# Patient Record
Sex: FEMALE | Race: WHITE | NOT HISPANIC OR LATINO | Employment: FULL TIME | URBAN - METROPOLITAN AREA
[De-identification: names, ages, dates, MRNs, and addresses within clinical notes are randomized per-mention and may not be internally consistent; named-entity substitution may affect disease eponyms.]

---

## 2017-03-08 ENCOUNTER — LAB REQUISITION (OUTPATIENT)
Dept: LAB | Facility: HOSPITAL | Age: 37
End: 2017-03-08
Payer: COMMERCIAL

## 2017-03-08 DIAGNOSIS — Z11.51 ENCOUNTER FOR SCREENING FOR HUMAN PAPILLOMAVIRUS (HPV): ICD-10-CM

## 2017-03-08 DIAGNOSIS — Z01.419 ENCOUNTER FOR GYNECOLOGICAL EXAMINATION WITHOUT ABNORMAL FINDING: ICD-10-CM

## 2017-03-08 PROCEDURE — 87624 HPV HI-RISK TYP POOLED RSLT: CPT | Performed by: OBSTETRICS & GYNECOLOGY

## 2017-03-08 PROCEDURE — G0145 SCR C/V CYTO,THINLAYER,RESCR: HCPCS | Performed by: OBSTETRICS & GYNECOLOGY

## 2017-03-13 LAB — HPV RRNA GENITAL QL NAA+PROBE: NORMAL

## 2017-03-16 LAB
LAB AP GYN PRIMARY INTERPRETATION: NORMAL
LAB AP LMP: NORMAL
Lab: NORMAL
PATH INTERP SPEC-IMP: NORMAL

## 2017-04-10 ENCOUNTER — ALLSCRIPTS OFFICE VISIT (OUTPATIENT)
Dept: OTHER | Facility: OTHER | Age: 37
End: 2017-04-10

## 2017-06-06 ENCOUNTER — TRANSCRIBE ORDERS (OUTPATIENT)
Dept: LAB | Facility: HOSPITAL | Age: 37
End: 2017-06-06

## 2017-06-06 DIAGNOSIS — Z00.8 ENCOUNTER FOR OTHER GENERAL EXAMINATION: Primary | ICD-10-CM

## 2017-06-07 ENCOUNTER — APPOINTMENT (OUTPATIENT)
Dept: LAB | Facility: HOSPITAL | Age: 37
End: 2017-06-07
Payer: COMMERCIAL

## 2017-06-07 DIAGNOSIS — Z00.8 ENCOUNTER FOR OTHER GENERAL EXAMINATION: ICD-10-CM

## 2017-06-07 LAB
CHOLEST SERPL-MCNC: 154 MG/DL (ref 50–200)
EST. AVERAGE GLUCOSE BLD GHB EST-MCNC: 111 MG/DL
HBA1C MFR BLD: 5.5 % (ref 4.2–6.3)
HDLC SERPL-MCNC: 54 MG/DL (ref 40–60)
LDLC SERPL CALC-MCNC: 79 MG/DL (ref 0–100)
TRIGL SERPL-MCNC: 105 MG/DL

## 2017-06-07 PROCEDURE — 83036 HEMOGLOBIN GLYCOSYLATED A1C: CPT

## 2017-06-07 PROCEDURE — 36415 COLL VENOUS BLD VENIPUNCTURE: CPT

## 2017-06-07 PROCEDURE — 80061 LIPID PANEL: CPT

## 2017-09-22 PROCEDURE — G0145 SCR C/V CYTO,THINLAYER,RESCR: HCPCS | Performed by: OBSTETRICS & GYNECOLOGY

## 2017-09-22 PROCEDURE — 87624 HPV HI-RISK TYP POOLED RSLT: CPT | Performed by: OBSTETRICS & GYNECOLOGY

## 2017-09-30 ENCOUNTER — LAB REQUISITION (OUTPATIENT)
Dept: LAB | Facility: HOSPITAL | Age: 37
End: 2017-09-30
Payer: COMMERCIAL

## 2017-09-30 DIAGNOSIS — R87.610 ATYPICAL SQUAMOUS CELLS OF UNDETERMINED SIGNIFICANCE ON CYTOLOGIC SMEAR OF CERVIX (ASC-US): ICD-10-CM

## 2017-10-19 LAB
HPV RRNA GENITAL QL NAA+PROBE: NORMAL
LAB AP GYN PRIMARY INTERPRETATION: NORMAL
Lab: NORMAL
PATH INTERP SPEC-IMP: NORMAL

## 2018-01-13 VITALS
HEIGHT: 59 IN | HEART RATE: 96 BPM | RESPIRATION RATE: 16 BRPM | BODY MASS INDEX: 35.08 KG/M2 | TEMPERATURE: 98.2 F | DIASTOLIC BLOOD PRESSURE: 68 MMHG | WEIGHT: 174 LBS | SYSTOLIC BLOOD PRESSURE: 112 MMHG | OXYGEN SATURATION: 98 %

## 2018-03-09 ENCOUNTER — LAB REQUISITION (OUTPATIENT)
Dept: LAB | Facility: HOSPITAL | Age: 38
End: 2018-03-09
Payer: COMMERCIAL

## 2018-03-09 DIAGNOSIS — R87.610 ATYPICAL SQUAMOUS CELLS OF UNDETERMINED SIGNIFICANCE ON CYTOLOGIC SMEAR OF CERVIX (ASC-US): ICD-10-CM

## 2018-03-09 DIAGNOSIS — R87.810 CERVICAL HIGH RISK HUMAN PAPILLOMAVIRUS (HPV) DNA TEST POSITIVE: ICD-10-CM

## 2018-03-09 PROCEDURE — G0145 SCR C/V CYTO,THINLAYER,RESCR: HCPCS | Performed by: OBSTETRICS & GYNECOLOGY

## 2018-03-15 LAB
LAB AP GYN PRIMARY INTERPRETATION: NORMAL
LAB AP LMP: NORMAL
Lab: NORMAL

## 2018-07-16 ENCOUNTER — TRANSCRIBE ORDERS (OUTPATIENT)
Dept: LAB | Facility: HOSPITAL | Age: 38
End: 2018-07-16

## 2018-07-16 DIAGNOSIS — Z00.8 HEALTH EXAMINATION IN POPULATION SURVEYS: Primary | ICD-10-CM

## 2018-07-18 ENCOUNTER — APPOINTMENT (OUTPATIENT)
Dept: LAB | Facility: HOSPITAL | Age: 38
End: 2018-07-18
Payer: COMMERCIAL

## 2018-07-18 DIAGNOSIS — Z00.8 HEALTH EXAMINATION IN POPULATION SURVEYS: ICD-10-CM

## 2018-07-18 LAB
CHOLEST SERPL-MCNC: 170 MG/DL (ref 50–200)
EST. AVERAGE GLUCOSE BLD GHB EST-MCNC: 100 MG/DL
HBA1C MFR BLD: 5.1 % (ref 4.2–6.3)
HDLC SERPL-MCNC: 53 MG/DL (ref 40–60)
LDLC SERPL CALC-MCNC: 87 MG/DL (ref 0–100)
NONHDLC SERPL-MCNC: 117 MG/DL
TRIGL SERPL-MCNC: 151 MG/DL

## 2018-07-18 PROCEDURE — 36415 COLL VENOUS BLD VENIPUNCTURE: CPT

## 2018-07-18 PROCEDURE — 80061 LIPID PANEL: CPT

## 2018-07-18 PROCEDURE — 83036 HEMOGLOBIN GLYCOSYLATED A1C: CPT

## 2019-02-14 ENCOUNTER — OFFICE VISIT (OUTPATIENT)
Dept: FAMILY MEDICINE CLINIC | Facility: CLINIC | Age: 39
End: 2019-02-14
Payer: COMMERCIAL

## 2019-02-14 VITALS
HEIGHT: 59 IN | OXYGEN SATURATION: 99 % | WEIGHT: 180.4 LBS | DIASTOLIC BLOOD PRESSURE: 84 MMHG | SYSTOLIC BLOOD PRESSURE: 130 MMHG | BODY MASS INDEX: 36.37 KG/M2 | HEART RATE: 78 BPM | TEMPERATURE: 98.5 F | RESPIRATION RATE: 16 BRPM

## 2019-02-14 DIAGNOSIS — J01.00 ACUTE NON-RECURRENT MAXILLARY SINUSITIS: Primary | ICD-10-CM

## 2019-02-14 PROCEDURE — 3008F BODY MASS INDEX DOCD: CPT | Performed by: PHYSICIAN ASSISTANT

## 2019-02-14 PROCEDURE — 99213 OFFICE O/P EST LOW 20 MIN: CPT | Performed by: PHYSICIAN ASSISTANT

## 2019-02-14 RX ORDER — LORATADINE 10 MG/1
1 TABLET ORAL DAILY
COMMUNITY
Start: 2015-06-25

## 2019-02-14 RX ORDER — ALBUTEROL SULFATE 90 UG/1
2 AEROSOL, METERED RESPIRATORY (INHALATION) EVERY 4 HOURS PRN
COMMUNITY
Start: 2017-04-10 | End: 2019-02-14 | Stop reason: ALTCHOICE

## 2019-02-14 RX ORDER — FLUTICASONE PROPIONATE 50 MCG
1 SPRAY, SUSPENSION (ML) NASAL DAILY
COMMUNITY
Start: 2016-11-03 | End: 2019-02-14 | Stop reason: ALTCHOICE

## 2019-02-14 RX ORDER — NORGESTIMATE AND ETHINYL ESTRADIOL 7DAYSX3 LO
KIT ORAL
COMMUNITY
End: 2019-09-18 | Stop reason: SDUPTHER

## 2019-02-14 RX ORDER — OMEPRAZOLE 20 MG/1
20 CAPSULE, DELAYED RELEASE ORAL DAILY
COMMUNITY

## 2019-02-14 RX ORDER — AMOXICILLIN 500 MG/1
500 CAPSULE ORAL EVERY 8 HOURS SCHEDULED
Qty: 30 CAPSULE | Refills: 0 | Status: SHIPPED | OUTPATIENT
Start: 2019-02-14 | End: 2019-02-24

## 2019-02-14 NOTE — LETTER
February 14, 2019     Patient: Chauncey Tejada   YOB: 1980   Date of Visit: 2/14/2019       To Whom it May Concern:    Chauncey Tejada is under my professional care  She was seen in my office on 2/14/2019  She may return to work on 2/18/2109  If you have any questions or concerns, please don't hesitate to call           Sincerely,          Gail Yang PA-C        CC: No Recipients

## 2019-02-14 NOTE — PROGRESS NOTES
Assessment/Plan:     Diagnoses and all orders for this visit:    Acute non-recurrent maxillary sinusitis  Comments:  P  O  Amoxicillin ordered  Patient to rest increase fluid gargle with warm salt water  Patient may take over-the-counter sinus cold preps if needed    Orders:  -     amoxicillin (AMOXIL) 500 mg capsule; Take 1 capsule (500 mg total) by mouth every 8 (eight) hours for 10 days    Other orders  -     norgestimate-ethinyl estradiol (ORTHO TRI-CYCLEN LO) 0 18/0 215/0 25 MG-25 MCG per tablet; Take by mouth  -     loratadine (CLARITIN) 10 mg tablet; Take 1 tablet by mouth daily  -     Discontinue: fluticasone (FLONASE) 50 mcg/act nasal spray; 1 spray into each nostril daily  -     Discontinue: albuterol (PROAIR HFA) 90 mcg/act inhaler; Inhale 2 puffs every 4 (four) hours as needed  -     omeprazole (PriLOSEC) 20 mg delayed release capsule; Take 20 mg by mouth daily          Subjective:      Patient ID: Sam Heredia is a 41 y o  female  Patient presents with upper respiratory symptoms for over 6 days  Patient states this started with a sore throat now she has nasal congestion right ear pain headache and sinus pressure very little cough no fever  Some over the counter cold preps at time      The following portions of the patient's history were reviewed and updated as appropriate:   She  has no past medical history on file    She   Patient Active Problem List    Diagnosis Date Noted    Acute non-recurrent maxillary sinusitis 02/14/2019     Current Outpatient Medications   Medication Sig Dispense Refill    loratadine (CLARITIN) 10 mg tablet Take 1 tablet by mouth daily      norgestimate-ethinyl estradiol (ORTHO TRI-CYCLEN LO) 0 18/0 215/0 25 MG-25 MCG per tablet Take by mouth      omeprazole (PriLOSEC) 20 mg delayed release capsule Take 20 mg by mouth daily      amoxicillin (AMOXIL) 500 mg capsule Take 1 capsule (500 mg total) by mouth every 8 (eight) hours for 10 days 30 capsule 0     No current facility-administered medications for this visit  Current Outpatient Medications on File Prior to Visit   Medication Sig    loratadine (CLARITIN) 10 mg tablet Take 1 tablet by mouth daily    norgestimate-ethinyl estradiol (ORTHO TRI-CYCLEN LO) 0 18/0 215/0 25 MG-25 MCG per tablet Take by mouth    omeprazole (PriLOSEC) 20 mg delayed release capsule Take 20 mg by mouth daily    [DISCONTINUED] albuterol (PROAIR HFA) 90 mcg/act inhaler Inhale 2 puffs every 4 (four) hours as needed    [DISCONTINUED] fluticasone (FLONASE) 50 mcg/act nasal spray 1 spray into each nostril daily     No current facility-administered medications on file prior to visit  She has No Known Allergies       Review of Systems   Constitutional: Negative for activity change, appetite change, chills, fatigue and fever  HENT: Positive for postnasal drip, rhinorrhea, sinus pressure, sinus pain and sore throat  Negative for ear pain  Respiratory: Negative for cough  Neurological: Positive for headaches  Negative for dizziness  Objective:        Physical Exam   Constitutional: She is oriented to person, place, and time  She appears well-developed and well-nourished  No distress  HENT:   Head: Normocephalic and atraumatic  Right Ear: Tympanic membrane, external ear and ear canal normal    Left Ear: Tympanic membrane, external ear and ear canal normal    Nose: Right sinus exhibits maxillary sinus tenderness and frontal sinus tenderness  Left sinus exhibits frontal sinus tenderness  Mouth/Throat: Posterior oropharyngeal erythema present  No oropharyngeal exudate  Eyes: Pupils are equal, round, and reactive to light  Conjunctivae are normal    Neck: Normal range of motion  Neck supple  Carotid bruit is not present  No thyromegaly present  Cardiovascular: Normal rate and regular rhythm  Exam reveals no gallop and no friction rub  No murmur heard    Pulmonary/Chest: Effort normal and breath sounds normal  No respiratory distress  She has no wheezes  Musculoskeletal: Normal range of motion  She exhibits no edema  Lymphadenopathy:     She has no cervical adenopathy  Neurological: She is alert and oriented to person, place, and time  Skin: Skin is warm and dry  No rash noted  She is not diaphoretic  No erythema  Psychiatric: She has a normal mood and affect  Her behavior is normal  Judgment and thought content normal    Nursing note and vitals reviewed

## 2019-03-13 ENCOUNTER — OFFICE VISIT (OUTPATIENT)
Dept: OBGYN CLINIC | Facility: CLINIC | Age: 39
End: 2019-03-13
Payer: COMMERCIAL

## 2019-03-13 VITALS
WEIGHT: 178.5 LBS | DIASTOLIC BLOOD PRESSURE: 72 MMHG | HEIGHT: 59 IN | SYSTOLIC BLOOD PRESSURE: 118 MMHG | BODY MASS INDEX: 35.99 KG/M2

## 2019-03-13 DIAGNOSIS — Z01.419 CERVICAL SMEAR, AS PART OF ROUTINE GYNECOLOGICAL EXAMINATION: ICD-10-CM

## 2019-03-13 DIAGNOSIS — Z01.419 ENCOUNTER FOR WELL WOMAN EXAM: Primary | ICD-10-CM

## 2019-03-13 DIAGNOSIS — N87.9 DYSPLASIA OF CERVIX: ICD-10-CM

## 2019-03-13 DIAGNOSIS — Z30.41 SURVEILLANCE FOR BIRTH CONTROL, ORAL CONTRACEPTIVES: ICD-10-CM

## 2019-03-13 PROCEDURE — 87624 HPV HI-RISK TYP POOLED RSLT: CPT | Performed by: PHYSICIAN ASSISTANT

## 2019-03-13 PROCEDURE — 99385 PREV VISIT NEW AGE 18-39: CPT | Performed by: PHYSICIAN ASSISTANT

## 2019-03-13 PROCEDURE — G0124 SCREEN C/V THIN LAYER BY MD: HCPCS | Performed by: PATHOLOGY

## 2019-03-13 PROCEDURE — G0145 SCR C/V CYTO,THINLAYER,RESCR: HCPCS | Performed by: PATHOLOGY

## 2019-03-13 NOTE — PROGRESS NOTES
Pt is here for yearly exam  Pt is doing well on orthotricyclen  Pt having regular cycles  Pt is considering stopping birth control, just because she has been on it for so long  Pt has no breast concerns, and B&B ok     3/9/18 Normal Pap    9/22/17 ASCUS, +HPV    3/8/17 ASCUC, +HPV

## 2019-03-19 LAB
LAB AP GYN PRIMARY INTERPRETATION: ABNORMAL
Lab: ABNORMAL
PATH INTERP SPEC-IMP: ABNORMAL

## 2019-03-20 LAB
HPV HR 12 DNA CVX QL NAA+PROBE: NEGATIVE
HPV16 DNA CVX QL NAA+PROBE: NEGATIVE
HPV18 DNA CVX QL NAA+PROBE: NEGATIVE

## 2019-05-29 ENCOUNTER — OFFICE VISIT (OUTPATIENT)
Dept: FAMILY MEDICINE CLINIC | Facility: CLINIC | Age: 39
End: 2019-05-29
Payer: COMMERCIAL

## 2019-05-29 VITALS
HEIGHT: 59 IN | HEART RATE: 74 BPM | RESPIRATION RATE: 16 BRPM | DIASTOLIC BLOOD PRESSURE: 82 MMHG | SYSTOLIC BLOOD PRESSURE: 124 MMHG | OXYGEN SATURATION: 98 % | BODY MASS INDEX: 36 KG/M2 | WEIGHT: 178.6 LBS | TEMPERATURE: 97.6 F

## 2019-05-29 DIAGNOSIS — J01.40 ACUTE NON-RECURRENT PANSINUSITIS: Primary | ICD-10-CM

## 2019-05-29 PROCEDURE — 1036F TOBACCO NON-USER: CPT | Performed by: FAMILY MEDICINE

## 2019-05-29 PROCEDURE — 3008F BODY MASS INDEX DOCD: CPT | Performed by: FAMILY MEDICINE

## 2019-05-29 PROCEDURE — 99213 OFFICE O/P EST LOW 20 MIN: CPT | Performed by: FAMILY MEDICINE

## 2019-05-29 RX ORDER — FLUTICASONE PROPIONATE 50 MCG
1 SPRAY, SUSPENSION (ML) NASAL DAILY
Qty: 1 BOTTLE | Refills: 1 | Status: SHIPPED | OUTPATIENT
Start: 2019-05-29 | End: 2020-10-23

## 2019-05-29 RX ORDER — AMOXICILLIN 875 MG/1
875 TABLET, COATED ORAL 2 TIMES DAILY
Qty: 20 TABLET | Refills: 0 | Status: SHIPPED | OUTPATIENT
Start: 2019-05-29 | End: 2019-06-08

## 2019-07-26 ENCOUNTER — OFFICE VISIT (OUTPATIENT)
Dept: FAMILY MEDICINE CLINIC | Facility: CLINIC | Age: 39
End: 2019-07-26
Payer: COMMERCIAL

## 2019-07-26 ENCOUNTER — APPOINTMENT (OUTPATIENT)
Dept: LAB | Facility: MEDICAL CENTER | Age: 39
End: 2019-07-26
Payer: COMMERCIAL

## 2019-07-26 VITALS
HEIGHT: 59 IN | BODY MASS INDEX: 35.68 KG/M2 | OXYGEN SATURATION: 99 % | SYSTOLIC BLOOD PRESSURE: 100 MMHG | RESPIRATION RATE: 16 BRPM | WEIGHT: 177 LBS | HEART RATE: 79 BPM | DIASTOLIC BLOOD PRESSURE: 64 MMHG | TEMPERATURE: 97.8 F

## 2019-07-26 DIAGNOSIS — R73.9 HYPERGLYCEMIA: ICD-10-CM

## 2019-07-26 DIAGNOSIS — E66.9 CLASS 2 OBESITY WITH BODY MASS INDEX (BMI) OF 35.0 TO 35.9 IN ADULT, UNSPECIFIED OBESITY TYPE, UNSPECIFIED WHETHER SERIOUS COMORBIDITY PRESENT: ICD-10-CM

## 2019-07-26 DIAGNOSIS — E78.5 DYSLIPIDEMIA: ICD-10-CM

## 2019-07-26 DIAGNOSIS — R53.83 FATIGUE, UNSPECIFIED TYPE: ICD-10-CM

## 2019-07-26 DIAGNOSIS — G43.809 OTHER MIGRAINE WITHOUT STATUS MIGRAINOSUS, NOT INTRACTABLE: Primary | ICD-10-CM

## 2019-07-26 DIAGNOSIS — J32.9 SINUSITIS, UNSPECIFIED CHRONICITY, UNSPECIFIED LOCATION: ICD-10-CM

## 2019-07-26 LAB
ALBUMIN SERPL BCP-MCNC: 3.2 G/DL (ref 3.5–5)
ALP SERPL-CCNC: 71 U/L (ref 46–116)
ALT SERPL W P-5'-P-CCNC: 44 U/L (ref 12–78)
ANION GAP SERPL CALCULATED.3IONS-SCNC: 8 MMOL/L (ref 4–13)
AST SERPL W P-5'-P-CCNC: 15 U/L (ref 5–45)
BILIRUB SERPL-MCNC: 0.28 MG/DL (ref 0.2–1)
BUN SERPL-MCNC: 8 MG/DL (ref 5–25)
CALCIUM SERPL-MCNC: 9.2 MG/DL (ref 8.3–10.1)
CHLORIDE SERPL-SCNC: 106 MMOL/L (ref 100–108)
CHOLEST SERPL-MCNC: 177 MG/DL (ref 50–200)
CO2 SERPL-SCNC: 25 MMOL/L (ref 21–32)
CREAT SERPL-MCNC: 0.75 MG/DL (ref 0.6–1.3)
ERYTHROCYTE [DISTWIDTH] IN BLOOD BY AUTOMATED COUNT: 15 % (ref 11.6–15.1)
EST. AVERAGE GLUCOSE BLD GHB EST-MCNC: 103 MG/DL
GFR SERPL CREATININE-BSD FRML MDRD: 101 ML/MIN/1.73SQ M
GLUCOSE P FAST SERPL-MCNC: 75 MG/DL (ref 65–99)
HBA1C MFR BLD: 5.2 % (ref 4.2–6.3)
HCT VFR BLD AUTO: 38.8 % (ref 34.8–46.1)
HDLC SERPL-MCNC: 62 MG/DL (ref 40–60)
HGB BLD-MCNC: 12.2 G/DL (ref 11.5–15.4)
LDLC SERPL CALC-MCNC: 91 MG/DL (ref 0–100)
MCH RBC QN AUTO: 28 PG (ref 26.8–34.3)
MCHC RBC AUTO-ENTMCNC: 31.4 G/DL (ref 31.4–37.4)
MCV RBC AUTO: 89 FL (ref 82–98)
NONHDLC SERPL-MCNC: 115 MG/DL
PLATELET # BLD AUTO: 568 THOUSANDS/UL (ref 149–390)
PMV BLD AUTO: 9.1 FL (ref 8.9–12.7)
POTASSIUM SERPL-SCNC: 4.2 MMOL/L (ref 3.5–5.3)
PROT SERPL-MCNC: 8.2 G/DL (ref 6.4–8.2)
RBC # BLD AUTO: 4.35 MILLION/UL (ref 3.81–5.12)
SODIUM SERPL-SCNC: 139 MMOL/L (ref 136–145)
TRIGL SERPL-MCNC: 120 MG/DL
TSH SERPL DL<=0.05 MIU/L-ACNC: 1.26 UIU/ML (ref 0.36–3.74)
WBC # BLD AUTO: 9.59 THOUSAND/UL (ref 4.31–10.16)

## 2019-07-26 PROCEDURE — 85027 COMPLETE CBC AUTOMATED: CPT

## 2019-07-26 PROCEDURE — 84443 ASSAY THYROID STIM HORMONE: CPT

## 2019-07-26 PROCEDURE — 36415 COLL VENOUS BLD VENIPUNCTURE: CPT

## 2019-07-26 PROCEDURE — 99214 OFFICE O/P EST MOD 30 MIN: CPT | Performed by: INTERNAL MEDICINE

## 2019-07-26 PROCEDURE — 80053 COMPREHEN METABOLIC PANEL: CPT

## 2019-07-26 PROCEDURE — 80061 LIPID PANEL: CPT

## 2019-07-26 PROCEDURE — 83036 HEMOGLOBIN GLYCOSYLATED A1C: CPT

## 2019-07-26 PROCEDURE — 3008F BODY MASS INDEX DOCD: CPT | Performed by: INTERNAL MEDICINE

## 2019-07-26 RX ORDER — CEFPROZIL 500 MG/1
500 TABLET, FILM COATED ORAL 2 TIMES DAILY
Qty: 20 TABLET | Refills: 0 | Status: SHIPPED | OUTPATIENT
Start: 2019-07-26 | End: 2019-08-05

## 2019-07-26 RX ORDER — MELOXICAM 15 MG/1
15 TABLET ORAL DAILY
Qty: 30 TABLET | Refills: 1 | Status: SHIPPED | OUTPATIENT
Start: 2019-07-26 | End: 2020-02-03 | Stop reason: SDUPTHER

## 2019-07-26 RX ORDER — ESCITALOPRAM OXALATE 10 MG/1
10 TABLET ORAL DAILY
Qty: 30 TABLET | Refills: 1 | Status: SHIPPED | OUTPATIENT
Start: 2019-07-26 | End: 2019-09-01

## 2019-07-26 RX ORDER — ALPRAZOLAM 0.5 MG/1
TABLET ORAL
Refills: 0 | COMMUNITY
Start: 2019-07-09 | End: 2019-08-30

## 2019-07-26 RX ORDER — SODIUM FLUORIDE 6 MG/ML
PASTE, DENTIFRICE DENTAL
Refills: 5 | COMMUNITY
Start: 2019-07-10

## 2019-07-26 NOTE — PROGRESS NOTES
Assessment/Plan:         Diagnoses and all orders for this visit:    Other migraine without status migrainosus, not intractable  Comments:  will start lexapro for prophylaxsis  Orders:  -     cefprosil (CEFZIL) 500 MG tablet; Take 1 tablet (500 mg total) by mouth 2 (two) times a day for 10 days  -     meloxicam (MOBIC) 15 mg tablet; Take 1 tablet (15 mg total) by mouth daily  -     escitalopram (LEXAPRO) 10 mg tablet; Take 1 tablet (10 mg total) by mouth daily    Dyslipidemia  -     Comprehensive metabolic panel; Future  -     Lipid panel; Future    Class 2 obesity with body mass index (BMI) of 35 0 to 35 9 in adult, unspecified obesity type, unspecified whether serious comorbidity present  -     TSH, 3rd generation with Free T4 reflex; Future    Hyperglycemia  -     Hemoglobin A1C; Future    Fatigue, unspecified type  -     CBC; Future    Other orders  -     ALPRAZolam (XANAX) 0 5 mg tablet; PLEASE SEE ATTACHED FOR DETAILED DIRECTIONS  -     PREVIDENT 5000 BOOSTER PLUS 1 1 % PSTE; Use as directed          Subjective:      Patient ID: Olu Burk is a 46 y o  female  Pt commplains of h/a x 1 week  She has had h/a on and off x may      The following portions of the patient's history were reviewed and updated as appropriate: She  has a past medical history of Abnormal Pap smear of cervix and Radius fracture  She   Patient Active Problem List    Diagnosis Date Noted    Acute non-recurrent maxillary sinusitis 02/14/2019     She  has a past surgical history that includes Tooth extraction (N/A, 09/01/2018)  Her family history includes Hypertension in her father and mother  She  reports that she has never smoked  She has never used smokeless tobacco  She reports that she drinks alcohol  She reports that she does not use drugs    Current Outpatient Medications   Medication Sig Dispense Refill    ALPRAZolam (XANAX) 0 5 mg tablet PLEASE SEE ATTACHED FOR DETAILED DIRECTIONS  0    fluticasone (FLONASE) 50 mcg/act nasal spray 1 spray into each nostril daily 1 Bottle 1    loratadine (CLARITIN) 10 mg tablet Take 1 tablet by mouth daily      norgestimate-ethinyl estradiol (ORTHO TRI-CYCLEN LO) 0 18/0 215/0 25 MG-25 MCG per tablet Take by mouth      omeprazole (PriLOSEC) 20 mg delayed release capsule Take 20 mg by mouth daily      PREVIDENT 5000 BOOSTER PLUS 1 1 % PSTE Use as directed  5    cefprosil (CEFZIL) 500 MG tablet Take 1 tablet (500 mg total) by mouth 2 (two) times a day for 10 days 20 tablet 0    escitalopram (LEXAPRO) 10 mg tablet Take 1 tablet (10 mg total) by mouth daily 30 tablet 1    meloxicam (MOBIC) 15 mg tablet Take 1 tablet (15 mg total) by mouth daily 30 tablet 1     No current facility-administered medications for this visit  Current Outpatient Medications on File Prior to Visit   Medication Sig    ALPRAZolam (XANAX) 0 5 mg tablet PLEASE SEE ATTACHED FOR DETAILED DIRECTIONS    fluticasone (FLONASE) 50 mcg/act nasal spray 1 spray into each nostril daily    loratadine (CLARITIN) 10 mg tablet Take 1 tablet by mouth daily    norgestimate-ethinyl estradiol (ORTHO TRI-CYCLEN LO) 0 18/0 215/0 25 MG-25 MCG per tablet Take by mouth    omeprazole (PriLOSEC) 20 mg delayed release capsule Take 20 mg by mouth daily    PREVIDENT 5000 BOOSTER PLUS 1 1 % PSTE Use as directed     No current facility-administered medications on file prior to visit  She has No Known Allergies       Review of Systems   Constitutional: Negative  HENT: Negative  Respiratory: Negative  Cardiovascular: Negative  Neurological: Positive for headaches  Negative for dizziness, facial asymmetry, weakness and light-headedness           Objective:      /64 (BP Location: Left arm, Patient Position: Sitting, Cuff Size: Large)   Pulse 79   Temp 97 8 °F (36 6 °C) (Tympanic)   Resp 16   Ht 4' 11" (1 499 m)   Wt 80 3 kg (177 lb)   LMP 07/12/2019 (Approximate)   SpO2 99%   BMI 35 75 kg/m²          Physical Exam Constitutional: She appears well-developed and well-nourished  No distress  HENT:   Head: Normocephalic  Right Ear: External ear normal    Left Ear: External ear normal    Nose: Nose normal    Mouth/Throat: Oropharynx is clear and moist  No oropharyngeal exudate  Neck: Normal range of motion  Neck supple  No thyromegaly present  Cardiovascular: Normal rate, regular rhythm, normal heart sounds and intact distal pulses  Exam reveals no gallop and no friction rub  No murmur heard  Pulmonary/Chest: Effort normal and breath sounds normal  No respiratory distress  She has no wheezes  She has no rales  She exhibits no tenderness  Lymphadenopathy:     She has no cervical adenopathy  Neurological: She displays normal reflexes  No cranial nerve deficit or sensory deficit  She exhibits normal muscle tone  Coordination normal    Skin: She is not diaphoretic

## 2019-07-26 NOTE — LETTER
July 26, 2019     Patient: Rock Fritz   YOB: 1980   Date of Visit: 7/26/2019       To Whom it May Concern:    Rock Fritz is under my professional care  She was seen in my office on 7/26/2019  She may return to work on 7/26/19  If you have any questions or concerns, please don't hesitate to call           Sincerely,          Huseyin Chambers DO        CC: No Recipients

## 2019-07-26 NOTE — LETTER
July 26, 2019     Patient: Kirby Medrano   YOB: 1980   Date of Visit: 7/26/2019       To Whom it May Concern:    Kirby Medrano is under my professional care  She was seen in my office on 7/26/2019  She may return to work on 7/29/19  Please excuse 7/25 and 7/26  If you have any questions or concerns, please don't hesitate to call           Sincerely,          Ray Dukes DO        CC: No Recipients

## 2019-08-30 ENCOUNTER — OFFICE VISIT (OUTPATIENT)
Dept: FAMILY MEDICINE CLINIC | Facility: CLINIC | Age: 39
End: 2019-08-30
Payer: COMMERCIAL

## 2019-08-30 VITALS
DIASTOLIC BLOOD PRESSURE: 68 MMHG | SYSTOLIC BLOOD PRESSURE: 114 MMHG | WEIGHT: 177 LBS | TEMPERATURE: 98.4 F | BODY MASS INDEX: 35.68 KG/M2 | RESPIRATION RATE: 16 BRPM | OXYGEN SATURATION: 98 % | HEART RATE: 69 BPM | HEIGHT: 59 IN

## 2019-08-30 DIAGNOSIS — G43.809 OTHER MIGRAINE WITHOUT STATUS MIGRAINOSUS, NOT INTRACTABLE: Primary | ICD-10-CM

## 2019-08-30 PROCEDURE — 99213 OFFICE O/P EST LOW 20 MIN: CPT | Performed by: INTERNAL MEDICINE

## 2019-08-30 PROCEDURE — 3008F BODY MASS INDEX DOCD: CPT | Performed by: INTERNAL MEDICINE

## 2019-08-30 RX ORDER — ESCITALOPRAM OXALATE 20 MG/1
20 TABLET ORAL DAILY
Qty: 90 TABLET | Refills: 1 | Status: SHIPPED | OUTPATIENT
Start: 2019-08-30 | End: 2020-02-03 | Stop reason: SDUPTHER

## 2019-08-30 NOTE — PROGRESS NOTES
Assessment/Plan:         Diagnoses and all orders for this visit:    Other migraine without status migrainosus, not intractable  -     CT head wo contrast; Future  -     escitalopram (LEXAPRO) 20 mg tablet; Take 1 tablet (20 mg total) by mouth daily          Subjective:      Patient ID: Sherri Ferguson is a 46 y o  female  Pt states she has had a decrease in her h/a with the lexapro  Will increase the dose to  20mg  She used the empiric tx for her sinus  She is not at the <3 h/a/month yet  She also has fmla      The following portions of the patient's history were reviewed and updated as appropriate: She  has a past medical history of Abnormal Pap smear of cervix and Radius fracture  She   Patient Active Problem List    Diagnosis Date Noted    Acute non-recurrent maxillary sinusitis 02/14/2019     She  has a past surgical history that includes Tooth extraction (N/A, 09/01/2018)  Her family history includes Hypertension in her father and mother  She  reports that she has never smoked  She has never used smokeless tobacco  She reports that she drinks alcohol  She reports that she does not use drugs  Current Outpatient Medications   Medication Sig Dispense Refill    escitalopram (LEXAPRO) 10 mg tablet Take 1 tablet (10 mg total) by mouth daily 30 tablet 1    fluticasone (FLONASE) 50 mcg/act nasal spray 1 spray into each nostril daily 1 Bottle 1    loratadine (CLARITIN) 10 mg tablet Take 1 tablet by mouth daily      meloxicam (MOBIC) 15 mg tablet Take 1 tablet (15 mg total) by mouth daily 30 tablet 1    norgestimate-ethinyl estradiol (ORTHO TRI-CYCLEN LO) 0 18/0 215/0 25 MG-25 MCG per tablet Take by mouth      omeprazole (PriLOSEC) 20 mg delayed release capsule Take 20 mg by mouth daily      PREVIDENT 5000 BOOSTER PLUS 1 1 % PSTE Use as directed  5    escitalopram (LEXAPRO) 20 mg tablet Take 1 tablet (20 mg total) by mouth daily 90 tablet 1     No current facility-administered medications for this visit  Current Outpatient Medications on File Prior to Visit   Medication Sig    escitalopram (LEXAPRO) 10 mg tablet Take 1 tablet (10 mg total) by mouth daily    fluticasone (FLONASE) 50 mcg/act nasal spray 1 spray into each nostril daily    loratadine (CLARITIN) 10 mg tablet Take 1 tablet by mouth daily    meloxicam (MOBIC) 15 mg tablet Take 1 tablet (15 mg total) by mouth daily    norgestimate-ethinyl estradiol (ORTHO TRI-CYCLEN LO) 0 18/0 215/0 25 MG-25 MCG per tablet Take by mouth    omeprazole (PriLOSEC) 20 mg delayed release capsule Take 20 mg by mouth daily    PREVIDENT 5000 BOOSTER PLUS 1 1 % PSTE Use as directed     No current facility-administered medications on file prior to visit  She has No Known Allergies       Review of Systems   Constitutional: Negative  HENT: Negative  Eyes: Negative  Respiratory: Negative  Cardiovascular: Negative  Neurological: Positive for headaches  Objective:      /68 (BP Location: Right arm, Patient Position: Sitting, Cuff Size: Large)   Pulse 69   Temp 98 4 °F (36 9 °C) (Tympanic)   Resp 16   Ht 4' 11" (1 499 m)   Wt 80 3 kg (177 lb)   SpO2 98%   BMI 35 75 kg/m²          Physical Exam   Constitutional: She appears well-developed and well-nourished  No distress  HENT:   Head: Normocephalic and atraumatic  Right Ear: External ear normal    Left Ear: External ear normal    Nose: Nose normal    Mouth/Throat: Oropharynx is clear and moist  No oropharyngeal exudate  Neck: Normal range of motion  Neck supple  No tracheal deviation present  No thyromegaly present  Cardiovascular: Normal rate, regular rhythm and normal heart sounds  Exam reveals no friction rub  No murmur heard  Pulmonary/Chest: Effort normal and breath sounds normal  No respiratory distress  She has no wheezes  She has no rales  Lymphadenopathy:     She has no cervical adenopathy  Skin: She is not diaphoretic

## 2019-09-06 ENCOUNTER — HOSPITAL ENCOUNTER (OUTPATIENT)
Dept: RADIOLOGY | Facility: MEDICAL CENTER | Age: 39
Discharge: HOME | End: 2019-09-06
Payer: COMMERCIAL

## 2019-09-06 DIAGNOSIS — G43.809 OTHER MIGRAINE WITHOUT STATUS MIGRAINOSUS, NOT INTRACTABLE: ICD-10-CM

## 2019-09-06 PROCEDURE — 70450 CT HEAD/BRAIN W/O DYE: CPT

## 2019-09-15 DIAGNOSIS — G43.809 OTHER MIGRAINE WITHOUT STATUS MIGRAINOSUS, NOT INTRACTABLE: ICD-10-CM

## 2019-09-16 RX ORDER — ESCITALOPRAM OXALATE 10 MG/1
TABLET ORAL
Qty: 30 TABLET | Refills: 1 | Status: SHIPPED | OUTPATIENT
Start: 2019-09-16 | End: 2020-02-03

## 2019-09-18 ENCOUNTER — OFFICE VISIT (OUTPATIENT)
Dept: OBGYN CLINIC | Facility: CLINIC | Age: 39
End: 2019-09-18
Payer: COMMERCIAL

## 2019-09-18 VITALS
HEIGHT: 59 IN | SYSTOLIC BLOOD PRESSURE: 110 MMHG | BODY MASS INDEX: 37.5 KG/M2 | WEIGHT: 186 LBS | DIASTOLIC BLOOD PRESSURE: 70 MMHG

## 2019-09-18 DIAGNOSIS — R87.610 ATYPICAL SQUAMOUS CELL CHANGES OF UNDETERMINED SIGNIFICANCE (ASCUS) ON CERVICAL CYTOLOGY WITH NEGATIVE HIGH RISK HUMAN PAPILLOMA VIRUS (HPV) TEST RESULT: Primary | ICD-10-CM

## 2019-09-18 DIAGNOSIS — Z30.41 SURVEILLANCE FOR BIRTH CONTROL, ORAL CONTRACEPTIVES: ICD-10-CM

## 2019-09-18 PROCEDURE — 87624 HPV HI-RISK TYP POOLED RSLT: CPT | Performed by: PHYSICIAN ASSISTANT

## 2019-09-18 PROCEDURE — 99213 OFFICE O/P EST LOW 20 MIN: CPT | Performed by: PHYSICIAN ASSISTANT

## 2019-09-18 PROCEDURE — 88175 CYTOPATH C/V AUTO FLUID REDO: CPT | Performed by: PATHOLOGY

## 2019-09-18 PROCEDURE — 88141 CYTOPATH C/V INTERPRET: CPT | Performed by: PATHOLOGY

## 2019-09-18 RX ORDER — NORGESTIMATE AND ETHINYL ESTRADIOL 7DAYSX3 LO
1 KIT ORAL DAILY
Qty: 90 TABLET | Refills: 1 | Status: SHIPPED | OUTPATIENT
Start: 2019-09-18 | End: 2020-08-05

## 2019-09-18 NOTE — PROGRESS NOTES
Assessment/Plan:    No problem-specific Assessment & Plan notes found for this encounter  Diagnoses and all orders for this visit:    Atypical squamous cell changes of undetermined significance (ASCUS) on cervical cytology with negative high risk human papilloma virus (HPV) test result  -     Liquid-based pap, diagnostic    Surveillance for birth control, oral contraceptives  -     norgestimate-ethinyl estradiol (ORTHO TRI-CYCLEN LO) 0 18/0 215/0 25 MG-25 MCG per tablet; Take 1 tablet by mouth daily          Subjective:      Patient ID: Skylar Man is a 46 y o  female  Pt is here for a repeat pap  H/o ascus -hpv  No c/o  Feeling well  Back on ortho lo  Migraines improved--seeing neuro too  No aura  Mom-recently dx w endometrial ca      The following portions of the patient's history were reviewed and updated as appropriate: allergies, current medications, past family history, past medical history, past social history, past surgical history and problem list     Review of Systems   Constitutional: Negative for chills, fever and unexpected weight change  Gastrointestinal: Negative for abdominal pain, blood in stool, constipation and diarrhea  Genitourinary: Negative  Objective:      /70 (BP Location: Left arm, Patient Position: Sitting, Cuff Size: Standard)   Ht 4' 11 06" (1 5 m)   Wt 84 4 kg (186 lb)   LMP 09/06/2019 (Exact Date)   BMI 37 50 kg/m²          Physical Exam   Constitutional: She appears well-developed and well-nourished  Genitourinary: Vagina normal  Pelvic exam was performed with patient supine  There is no rash or lesion on the right labia  There is no rash or lesion on the left labia  Cervix exhibits no motion tenderness, no discharge and no friability  Lymphadenopathy: No inguinal adenopathy noted on the right or left side  Nursing note and vitals reviewed

## 2019-09-18 NOTE — PROGRESS NOTES
The patient is here for a 6 month pap smear  The patient had an abnormal pap smear on 3/13/19  The patient has regular periods  The patient has no complaints  No vaginal or urinary issues

## 2019-09-25 LAB
LAB AP GYN PRIMARY INTERPRETATION: ABNORMAL
Lab: ABNORMAL
PATH INTERP SPEC-IMP: ABNORMAL

## 2019-09-27 ENCOUNTER — TELEPHONE (OUTPATIENT)
Dept: OBGYN CLINIC | Facility: CLINIC | Age: 39
End: 2019-09-27

## 2019-09-27 DIAGNOSIS — N72 CERVICAL INFLAMMATION: Primary | ICD-10-CM

## 2019-09-30 RX ORDER — METRONIDAZOLE 7.5 MG/G
1 GEL VAGINAL
Qty: 70 G | Refills: 0 | Status: SHIPPED | OUTPATIENT
Start: 2019-09-30 | End: 2020-02-03

## 2020-02-03 ENCOUNTER — OFFICE VISIT (OUTPATIENT)
Dept: FAMILY MEDICINE CLINIC | Facility: CLINIC | Age: 40
End: 2020-02-03
Payer: COMMERCIAL

## 2020-02-03 VITALS
WEIGHT: 183 LBS | BODY MASS INDEX: 36.89 KG/M2 | DIASTOLIC BLOOD PRESSURE: 66 MMHG | SYSTOLIC BLOOD PRESSURE: 106 MMHG | TEMPERATURE: 98.1 F | RESPIRATION RATE: 16 BRPM | OXYGEN SATURATION: 98 % | HEART RATE: 91 BPM | HEIGHT: 59 IN

## 2020-02-03 DIAGNOSIS — G43.809 OTHER MIGRAINE WITHOUT STATUS MIGRAINOSUS, NOT INTRACTABLE: ICD-10-CM

## 2020-02-03 PROCEDURE — 99214 OFFICE O/P EST MOD 30 MIN: CPT | Performed by: INTERNAL MEDICINE

## 2020-02-03 PROCEDURE — 3008F BODY MASS INDEX DOCD: CPT | Performed by: INTERNAL MEDICINE

## 2020-02-03 PROCEDURE — 1036F TOBACCO NON-USER: CPT | Performed by: INTERNAL MEDICINE

## 2020-02-03 RX ORDER — MELOXICAM 15 MG/1
15 TABLET ORAL DAILY PRN
Qty: 90 TABLET | Refills: 1 | Status: SHIPPED | OUTPATIENT
Start: 2020-02-03 | End: 2020-08-05 | Stop reason: SDUPTHER

## 2020-02-03 RX ORDER — ESCITALOPRAM OXALATE 20 MG/1
20 TABLET ORAL DAILY
Qty: 90 TABLET | Refills: 1 | Status: SHIPPED | OUTPATIENT
Start: 2020-02-03 | End: 2020-08-05 | Stop reason: SDUPTHER

## 2020-02-03 NOTE — PROGRESS NOTES
BMI Counseling: Body mass index is 36 96 kg/m²  The BMI is above normal  Nutrition recommendations include decreasing portion sizes and limiting drinks that contain sugar  Exercise recommendations include exercising 3-5 times per week  No pharmacotherapy was ordered  Patient referred to PCP due to patient being overweight  Assessment/Plan:         Diagnoses and all orders for this visit:    Other migraine without status migrainosus, not intractable  -     escitalopram (LEXAPRO) 20 mg tablet; Take 1 tablet (20 mg total) by mouth daily  -     meloxicam (MOBIC) 15 mg tablet; Take 1 tablet (15 mg total) by mouth daily as needed for moderate pain    Other migraine without status migrainosus, not intractable  Comments:  will start lexapro for prophylaxsis  Orders:  -     escitalopram (LEXAPRO) 20 mg tablet; Take 1 tablet (20 mg total) by mouth daily  -     meloxicam (MOBIC) 15 mg tablet; Take 1 tablet (15 mg total) by mouth daily as needed for moderate pain          Subjective:      Patient ID: Luisana Holt is a 46 y o  female  Pt in for fmla paperwork q 6 months  She feels the lexapro and mobic have greatly helped her h/a  She has had only 1 h/a since I saw her last      The following portions of the patient's history were reviewed and updated as appropriate: She  has a past medical history of Abnormal Pap smear of cervix, Migraine, and Radius fracture  She   Patient Active Problem List    Diagnosis Date Noted    Acute non-recurrent maxillary sinusitis 02/14/2019     She  has a past surgical history that includes Tooth extraction (N/A, 09/01/2018)  Her family history includes Endometrial cancer in her mother; Hypertension in her father and mother  She  reports that she has never smoked  She has never used smokeless tobacco  She reports that she drinks alcohol  She reports that she does not use drugs    Current Outpatient Medications   Medication Sig Dispense Refill    escitalopram (LEXAPRO) 20 mg tablet Take 1 tablet (20 mg total) by mouth daily 90 tablet 1    fluticasone (FLONASE) 50 mcg/act nasal spray 1 spray into each nostril daily 1 Bottle 1    loratadine (CLARITIN) 10 mg tablet Take 1 tablet by mouth daily      meloxicam (MOBIC) 15 mg tablet Take 1 tablet (15 mg total) by mouth daily as needed for moderate pain 90 tablet 1    norgestimate-ethinyl estradiol (ORTHO TRI-CYCLEN LO) 0 18/0 215/0 25 MG-25 MCG per tablet Take 1 tablet by mouth daily 90 tablet 1    omeprazole (PriLOSEC) 20 mg delayed release capsule Take 20 mg by mouth daily      PREVIDENT 5000 BOOSTER PLUS 1 1 % PSTE Use as directed  5     No current facility-administered medications for this visit  Current Outpatient Medications on File Prior to Visit   Medication Sig    fluticasone (FLONASE) 50 mcg/act nasal spray 1 spray into each nostril daily    loratadine (CLARITIN) 10 mg tablet Take 1 tablet by mouth daily    norgestimate-ethinyl estradiol (ORTHO TRI-CYCLEN LO) 0 18/0 215/0 25 MG-25 MCG per tablet Take 1 tablet by mouth daily    omeprazole (PriLOSEC) 20 mg delayed release capsule Take 20 mg by mouth daily    PREVIDENT 5000 BOOSTER PLUS 1 1 % PSTE Use as directed     No current facility-administered medications on file prior to visit  She has No Known Allergies       Review of Systems   Constitutional: Negative  HENT: Negative  Respiratory: Negative  Cardiovascular: Negative  Gastrointestinal: Negative  Objective:      /66 (BP Location: Right arm, Patient Position: Sitting, Cuff Size: Large)   Pulse 91   Temp 98 1 °F (36 7 °C) (Temporal)   Resp 16   Ht 4' 11" (1 499 m)   Wt 83 kg (183 lb)   LMP 01/23/2020 (Exact Date)   SpO2 98%   BMI 36 96 kg/m²          Physical Exam   Constitutional: She is oriented to person, place, and time  She appears well-developed and well-nourished  No distress  HENT:   Head: Normocephalic and atraumatic     Right Ear: External ear normal    Left Ear: External ear normal    Nose: Nose normal    Mouth/Throat: Oropharynx is clear and moist  No oropharyngeal exudate  Neck: Normal range of motion  Neck supple  No thyromegaly present  Cardiovascular: Normal rate, regular rhythm, normal heart sounds and intact distal pulses  Exam reveals no gallop and no friction rub  No murmur heard  Pulmonary/Chest: Effort normal and breath sounds normal  No stridor  No respiratory distress  She has no wheezes  She has no rales  She exhibits no tenderness  Abdominal: Soft  Bowel sounds are normal  She exhibits no distension and no mass  There is no tenderness  There is no guarding  Lymphadenopathy:     She has no cervical adenopathy  Neurological: She is alert and oriented to person, place, and time  She displays normal reflexes  No cranial nerve deficit or sensory deficit  She exhibits normal muscle tone  Coordination normal    Skin: She is not diaphoretic

## 2020-03-16 DIAGNOSIS — Z30.41 SURVEILLANCE FOR BIRTH CONTROL, ORAL CONTRACEPTIVES: Primary | ICD-10-CM

## 2020-03-16 RX ORDER — NORGESTIMATE AND ETHINYL ESTRADIOL 0.25-0.035
1 KIT ORAL DAILY
Qty: 30 TABLET | Refills: 2 | Status: SHIPPED | OUTPATIENT
Start: 2020-03-16 | End: 2020-05-14 | Stop reason: CLARIF

## 2020-05-14 ENCOUNTER — ANNUAL EXAM (OUTPATIENT)
Dept: OBGYN CLINIC | Facility: CLINIC | Age: 40
End: 2020-05-14
Payer: COMMERCIAL

## 2020-05-14 VITALS
HEIGHT: 59 IN | WEIGHT: 186 LBS | SYSTOLIC BLOOD PRESSURE: 124 MMHG | DIASTOLIC BLOOD PRESSURE: 80 MMHG | BODY MASS INDEX: 37.5 KG/M2

## 2020-05-14 DIAGNOSIS — Z01.419 ENCOUNTER FOR WELL WOMAN EXAM: Primary | ICD-10-CM

## 2020-05-14 DIAGNOSIS — Z12.39 ENCOUNTER FOR SCREENING BREAST EXAMINATION: ICD-10-CM

## 2020-05-14 DIAGNOSIS — Z01.419 CERVICAL SMEAR, AS PART OF ROUTINE GYNECOLOGICAL EXAMINATION: ICD-10-CM

## 2020-05-14 DIAGNOSIS — Z30.41 SURVEILLANCE FOR BIRTH CONTROL, ORAL CONTRACEPTIVES: ICD-10-CM

## 2020-05-14 DIAGNOSIS — R87.610 ATYPICAL SQUAMOUS CELL CHANGES OF UNDETERMINED SIGNIFICANCE (ASCUS) ON CERVICAL CYTOLOGY WITH NEGATIVE HIGH RISK HUMAN PAPILLOMA VIRUS (HPV) TEST RESULT: ICD-10-CM

## 2020-05-14 PROCEDURE — G0145 SCR C/V CYTO,THINLAYER,RESCR: HCPCS | Performed by: PATHOLOGY

## 2020-05-14 PROCEDURE — G0124 SCREEN C/V THIN LAYER BY MD: HCPCS | Performed by: PATHOLOGY

## 2020-05-14 PROCEDURE — 99395 PREV VISIT EST AGE 18-39: CPT | Performed by: PHYSICIAN ASSISTANT

## 2020-05-14 PROCEDURE — 87624 HPV HI-RISK TYP POOLED RSLT: CPT | Performed by: PHYSICIAN ASSISTANT

## 2020-05-14 RX ORDER — NORGESTIMATE AND ETHINYL ESTRADIOL 7DAYSX3 LO
1 KIT ORAL DAILY
Qty: 90 TABLET | Refills: 4 | Status: SHIPPED | OUTPATIENT
Start: 2020-05-14 | End: 2022-02-23 | Stop reason: SDUPTHER

## 2020-05-26 DIAGNOSIS — N76.0 BV (BACTERIAL VAGINOSIS): Primary | ICD-10-CM

## 2020-05-26 DIAGNOSIS — B96.89 BV (BACTERIAL VAGINOSIS): Primary | ICD-10-CM

## 2020-05-26 LAB
LAB AP GYN PRIMARY INTERPRETATION: ABNORMAL
Lab: ABNORMAL
PATH INTERP SPEC-IMP: ABNORMAL

## 2020-05-26 RX ORDER — METRONIDAZOLE 7.5 MG/G
1 GEL VAGINAL
Qty: 70 G | Refills: 0 | Status: SHIPPED | OUTPATIENT
Start: 2020-05-26 | End: 2020-05-31

## 2020-07-20 ENCOUNTER — OFFICE VISIT (OUTPATIENT)
Dept: BARIATRICS | Facility: CLINIC | Age: 40
End: 2020-07-20

## 2020-07-20 VITALS — TEMPERATURE: 98 F | BODY MASS INDEX: 37.8 KG/M2 | WEIGHT: 187.5 LBS | HEIGHT: 59 IN

## 2020-07-20 DIAGNOSIS — R63.5 ABNORMAL WEIGHT GAIN: ICD-10-CM

## 2020-07-20 PROCEDURE — RECHECK

## 2020-07-20 PROCEDURE — WMPFE WEIGHT MANAGEMENT PRO FEE EMPLOYEE

## 2020-07-20 NOTE — PROGRESS NOTES
Weight Management Medical Nutrition Assessment  Jed Cooper is here for initial RD assessment for Healthy Core program (bypass MD) alternate  She does not feel comfortable attending classes so will do 1 month of alternate and then transition to bundle  Current wt: 187 5 lbs  Has tried nutrisystem in the past but did not lose weight, tried low carb for a brief time  Has not done food logging but is willing to do so  Excess calories via creamer, regular soda and overall large portions  She does not usually have breakfast  She is receptive to using a meal replacement at breakfast  Meal plan provided      Patient seen by Medical Provider in past 6 months:  no  Requested to schedule appointment with Medical Provider: Yes    Anthropometric Measurements  Start Weight (#): 187 5 lbs   Ideal Body Weight (#): 97 5 lbs  Goal Weight (#): no specific number  Highest: 189 lbs  Lowest: 120 lbs    Weight Loss History  Previous weight loss attempts: Commercial Programs ([employer], Parul Cones, etc )    Food and Nutrition Related History  Wake up: 6 if working; otherwise later   Bed Time: 9-10    Food Recall  Breakfast: coffee w/coffeemate Singaporean vanilla large amount of creamer, usually skip breakfast unless w/e then Singaporean toast, maravilla   Snack: skip  Lunch: 12:00 s/w white bread, 1 tbsp regular greer, 2 slices deli cheese, ham/turkey 2 slices; chips or veggie straws, sometimes fruit cup in its own juice, regular soda can  Snack: 2:00 candy  Dinner: 5:00: 6 oz protein, 1 cup carb, 1 cup veggies, oil/butter  Snack: ice cream or something sweet    Beverages: water, sugar free beverages, regular soda and coffee/tea  Volume of beverage intake: maybe 50 oz water, 1 cup coffee, 1 soda    Weekends: Same  Cravings: sweets  Trouble area of day: 2 p m  & evening    Frequency of Eating out: irregularly  Food restrictions: n/a  Cooking: self   Food Shopping: self    Physical Activity Intake  Activity:none  Frequency:never  Physical limitations/barriers to exercise: n/a    Estimated Needs  Energy  Seca REE: 1537      X 1 3 -1000 =998  [employer] Needs: BMR : 6523   1-1 5# loss weekly sedentary:  292-5082            1-2# loss weekly lightly active: 210-3040  Protein: 53-66 oz      (1 2-1 5g/kg IBW)  Fluid: 52 oz     (35mL/kg IBW)    Nutrition Diagnosis  Yes; Overweight/obesity  related to Excess energy intake as evidenced by  BMI more than normative standard for age and sex (obesity-grade II 35-39  9)       Nutrition Intervention    Nutrition Prescription  Calories: 3425-2612  Protein: 77-92 gm    Meal Plan (Panda/Pro)  Breakfast: 200, 27  Snack: skip  Lunch: 350, 19  Snack: 100-150, 5-10  Dinner: 300-450, 21  Snack: 100-160, 5-15    Nutrition Education:    Healthy Core Manual  Calorie controlled menu  Lean protein food choices  Healthy snack options  Food journaling tips    Nutrition Counseling:  Strategies: meal planning, portion sizes, healthy snack choices, hydration, fiber intake, protein intake, exercise, food journal    Monitoring and Evaluation:  Evaluation criteria:  Energy Intake  Meet protein needs  Maintain adequate hydration  Monitor weekly weight  Meal planning/preparation  Food journal   Decreased portions at mealtimes and snacks  Physical activity     Barriers to learning:none  Readiness to change: Preparation:  (Getting ready to change)   Comprehension: good  Expected Compliance: good

## 2020-07-30 NOTE — PROGRESS NOTES
Weight Management Medical Nutrition Assessment  Nirmal Castrejon is here for f/u RD assessment for Healthy Core program (katherin [facility]Y) alternate  She does not feel comfortable attending classes so will do the alternate program  Current wt: 180 8  Lbs, loss of 6 7 lbs x 2 wks  At times still skipping breakfast  Feels she would be able to do a bar in the morning  Has only had soda 2-3x since last visit  Food logging and consuming ~1000 calories/day, ~55 gm protein/day  At times not having protein at meals  Recommendations provided  Questions answered re: macronutrients, exercise  Recommend use of resistance bands for upper body strength       Patient seen by Medical Provider in past 6 months:  no  Requested to schedule appointment with Medical Provider: already scheduled    Anthropometric Measurements  Start Weight (#): 187 5 lbs  Current wt: 180 8 lbs    Ideal Body Weight (#): 97 5 lbs  Goal Weight (#): no specific number  Highest: 189 lbs  Lowest: 120 lbs    Weight Loss History  Previous weight loss attempts: Commercial Programs ([employer], [employer], etc )    Food and Nutrition Related History  Wake up: 6 if working; otherwise later   Bed Time: 9-10    Food Recall  Breakfast: coffee w/fairlife creamer, skip OR meal replacement  Snack: skip  Lunch: 12:00 s/w 1 white bread, greer, 1 slices deli cheese, ham/turkey 2 slices;  veggie straws OR stir fried veggies OR string cheese, greek yogurt, Mormon rice crisps, v-8  Snack: 2:00 bar    Dinner: 5:00:  1/2 cottage cheese, mandarin oranges OR salad w/cheese, balsamic  Snack: wasabi almonds    Beverages: water, sugar free beverages, regular soda and coffee/tea  Volume of beverage intake: maybe 50 oz water, 1 cup coffee, occasional soda    Weekends: Same  Cravings: sweets  Trouble area of day: 2 p m  & evening    Frequency of Eating out: irregularly  Food restrictions: n/a  Cooking: self   Food Shopping: self    Physical Activity Intake  Activity:none  Frequency:never  Physical limitations/barriers to exercise: n/a    Estimated Needs  Energy  [employer] Needs: BMR : 1401   1-1 5# loss weekly sedentary:  151-0178            1-2# loss weekly lightly active: 401-5476  Protein: 53-66 oz      (1 2-1 5g/kg IBW)  Fluid: 52 oz     (35mL/kg IBW)    Nutrition Diagnosis  Yes; Overweight/obesity  related to Excess energy intake as evidenced by  BMI more than normative standard for age and sex (obesity-grade II 35-39  9)       Nutrition Intervention    Nutrition Prescription  Calories: 5562-2947  Protein: 60-75 gm    Meal Plan (Panda/Pro)  Breakfast: 160, 15  Snack: skip  Lunch: 350, 19  Snack: 100-150, 5-10  Dinner: 300-450, 21  Snack: 100-150, 5-10  +2 tbsp creamer = 70 panda     Nutrition Education:    Healthy Core Manual  Calorie controlled menu  Lean protein food choices  Healthy snack options  Food journaling tips  macronutrients  Physical activity    Nutrition Counseling:  Strategies: meal planning, portion sizes, healthy snack choices, hydration, fiber intake, protein intake, exercise, food journal    Monitoring and Evaluation:  Evaluation criteria:  Energy Intake  Meet protein needs  Maintain adequate hydration  Monitor weekly weight  Meal planning/preparation  Food journal   Decreased portions at mealtimes and snacks  Physical activity     Barriers to learning:none  Readiness to change: Action:  (Changing behavior)  Comprehension: good  Expected Compliance: good

## 2020-08-03 ENCOUNTER — OFFICE VISIT (OUTPATIENT)
Dept: BARIATRICS | Facility: CLINIC | Age: 40
End: 2020-08-03

## 2020-08-03 VITALS — BODY MASS INDEX: 36.45 KG/M2 | HEIGHT: 59 IN | WEIGHT: 180.8 LBS | TEMPERATURE: 97.7 F

## 2020-08-03 DIAGNOSIS — R63.5 ABNORMAL WEIGHT GAIN: Primary | ICD-10-CM

## 2020-08-03 PROCEDURE — RECHECK

## 2020-08-05 ENCOUNTER — OFFICE VISIT (OUTPATIENT)
Dept: FAMILY MEDICINE CLINIC | Facility: CLINIC | Age: 40
End: 2020-08-05
Payer: COMMERCIAL

## 2020-08-05 VITALS
HEART RATE: 78 BPM | SYSTOLIC BLOOD PRESSURE: 114 MMHG | OXYGEN SATURATION: 98 % | BODY MASS INDEX: 36.77 KG/M2 | HEIGHT: 59 IN | DIASTOLIC BLOOD PRESSURE: 70 MMHG | TEMPERATURE: 97 F | WEIGHT: 182.4 LBS

## 2020-08-05 DIAGNOSIS — G43.809 OTHER MIGRAINE WITHOUT STATUS MIGRAINOSUS, NOT INTRACTABLE: ICD-10-CM

## 2020-08-05 DIAGNOSIS — J30.2 SEASONAL ALLERGIES: ICD-10-CM

## 2020-08-05 DIAGNOSIS — K21.9 GASTROESOPHAGEAL REFLUX DISEASE WITHOUT ESOPHAGITIS: Primary | ICD-10-CM

## 2020-08-05 PROCEDURE — 3008F BODY MASS INDEX DOCD: CPT | Performed by: FAMILY MEDICINE

## 2020-08-05 PROCEDURE — 99214 OFFICE O/P EST MOD 30 MIN: CPT | Performed by: FAMILY MEDICINE

## 2020-08-05 PROCEDURE — 1036F TOBACCO NON-USER: CPT | Performed by: FAMILY MEDICINE

## 2020-08-05 RX ORDER — MELOXICAM 15 MG/1
15 TABLET ORAL DAILY PRN
Qty: 90 TABLET | Refills: 1 | Status: SHIPPED | OUTPATIENT
Start: 2020-08-05 | End: 2022-02-23 | Stop reason: SDUPTHER

## 2020-08-05 RX ORDER — ESCITALOPRAM OXALATE 20 MG/1
20 TABLET ORAL DAILY
Qty: 90 TABLET | Refills: 1 | Status: SHIPPED | OUTPATIENT
Start: 2020-08-05 | End: 2020-12-09 | Stop reason: SDUPTHER
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
## 2020-08-14 ENCOUNTER — OFFICE VISIT (OUTPATIENT)
Dept: BARIATRICS | Facility: CLINIC | Age: 40
End: 2020-08-14

## 2020-08-14 VITALS — HEIGHT: 59 IN | WEIGHT: 179.9 LBS | BODY MASS INDEX: 36.27 KG/M2

## 2020-08-14 DIAGNOSIS — R63.5 ABNORMAL WEIGHT GAIN: Primary | ICD-10-CM

## 2020-08-14 PROCEDURE — RECHECK

## 2020-08-14 NOTE — PROGRESS NOTES
Weight Management Medical Nutrition Assessment  Soraida Youngblood is here for f/u RD assessment for Healthy Core program (katherin [facility]Y) alternate  Current wt: 179 9  Lbs, loss of 1 lbs x 2 wks with overall loss of 7 6 lbs in just under a month  Less skipping breakfast than prior  Has only had soda 2-3x since last visit  Food logging and consuming ~1100 calories/day, ~64 gm protein/day (protein improved)  Questions answered re: macronutrients, sodium  She has ordered resistance bands  Patient seen by Medical Provider in past 6 months:  no  Requested to schedule appointment with Medical Provider: already scheduled    Anthropometric Measurements  Start Weight (#): 187 5 lbs  Current wt: 179 9 lbs    Ideal Body Weight (#): 97 5 lbs  Goal Weight (#): no specific number  Highest: 189 lbs  Lowest: 120 lbs    Weight Loss History  Previous weight loss attempts: Commercial Programs ([employer], Jess Thomas, etc )    Food and Nutrition Related History  Wake up: 6 if working; otherwise later   Bed Time: 9-10    Food Recall  Breakfast: coffee w/fairlife creamer, skip OR meal replacement OR our bar  Snack: skip  Lunch: 12:00   protein pack, greek yogurt OR sushi roll   Snack: 2:00 skip    Dinner: 5:00:  1/2 cottage cheese, mandarin oranges OR salad w/cheese, balsamic  Snack: wasabi almonds    Beverages: water, sugar free beverages, regular soda and coffee/tea  Volume of beverage intake: maybe 50 oz water, 1 cup coffee, occasional soda    Weekends: Same  Cravings: sweets  Trouble area of day: 2 p m  & evening    Frequency of Eating out: irregularly  Food restrictions: n/a  Cooking: self   Food Shopping: self    Physical Activity Intake  Activity:none  Frequency:never  Physical limitations/barriers to exercise: n/a    Estimated Needs  Energy  [employer] Needs:  BMR : 1401   1-1 5# loss weekly sedentary:  297-2489            1-2# loss weekly lightly active: 633-9156  Protein: 53-66 oz      (1 2-1 5g/kg IBW)  Fluid: 52 oz (35mL/kg IBW)    Nutrition Diagnosis  Yes; Overweight/obesity  related to Excess energy intake as evidenced by  BMI more than normative standard for age and sex (obesity-grade II 35-39  9)       Nutrition Intervention    Nutrition Prescription  Calories: 7247-6497  Protein: 60-75 gm    Meal Plan (Panda/Pro)  Breakfast: 160, 15  Snack: skip  Lunch: 350, 19  Snack: 100-150, 5-10  Dinner: 300-450, 21  Snack: 100-150, 5-10  +2 tbsp creamer = 70 panda     Nutrition Education:    Healthy Core Manual  Calorie controlled menu  Lean protein food choices  Healthy snack options  Food journaling tips  macronutrients  Physical activity    Nutrition Counseling:  Strategies: meal planning, portion sizes, healthy snack choices, hydration, fiber intake, protein intake, exercise, food journal    Monitoring and Evaluation:  Evaluation criteria:  Energy Intake  Meet protein needs  Maintain adequate hydration  Monitor weekly weight  Meal planning/preparation  Food journal   Decreased portions at mealtimes and snacks  Physical activity     Barriers to learning:none  Readiness to change: Action:  (Changing behavior)  Comprehension: good  Expected Compliance: good

## 2020-08-28 ENCOUNTER — OFFICE VISIT (OUTPATIENT)
Dept: BARIATRICS | Facility: CLINIC | Age: 40
End: 2020-08-28

## 2020-08-28 VITALS — BODY MASS INDEX: 35.9 KG/M2 | WEIGHT: 178.1 LBS | HEIGHT: 59 IN

## 2020-08-28 DIAGNOSIS — R63.5 ABNORMAL WEIGHT GAIN: ICD-10-CM

## 2020-08-28 PROCEDURE — WMMFE WEIGHT MANAGEMENT MON FEE EMPLOYEE

## 2020-08-28 PROCEDURE — RECHECK

## 2020-08-28 NOTE — PROGRESS NOTES
Weight Management Medical Nutrition Assessment  Brandon Sher is here for f/u RD assessment for Healthy Core program (katherin [facility]) alternate  Current wt: 178 1  Lbs, loss of 1 8 lbs x 2 wks with overall loss of 9 4 lbs x 5 wks  Less skipping breakfast than prior  Has not had any soda! Food logging and consuming ~1100 calories/day, ~64 gm protein/day, 104-120 gm carb  Resistance bands came but she is unsure how to use them  Guide provided  Patient seen by Medical Provider in past 6 months:  no  Requested to schedule appointment with Medical Provider: already scheduled    Anthropometric Measurements  Start Weight (#): 187 5 lbs 7/20/20  Current wt: 178 1 lbs   %TBW loss: 5%   Ideal Body Weight (#): 97 5 lbs  Goal Weight (#): no specific number  Highest: 189 lbs  Lowest: 120 lbs    Weight Loss History  Previous weight loss attempts: Commercial Programs ([employer], [employer], etc )    Food and Nutrition Related History  Wake up: 6 if working; otherwise later   Bed Time: 9-10    Food Recall  Breakfast: coffee w/fairlife creamer  OR our bar  Snack: skip  Lunch: 12:00   protein pack, greek yogurt (sometimes not both together)  shanice, 13 gm protein meat/cheese plate OR 2 slices deli meat, cheese, veggie straws  Snack: 2:00 skip    Dinner: 5:00:  1/2 cottage cheese, mandarin oranges OR salad w/cheese, balsamic  Snack: wasabi almonds    Beverages: water, sugar free beverages, regular soda and coffee/tea  Volume of beverage intake: maybe 50 oz water, 1 cup coffee, occasional soda    Weekends: Same  Cravings: sweets  Trouble area of day: 2 p m  & evening    Frequency of Eating out: irregularly  Food restrictions: n/a  Cooking: self   Food Shopping: self    Physical Activity Intake  Activity:none  Frequency:never  Physical limitations/barriers to exercise: n/a    Estimated Needs  Energy  [employer] Needs:  BMR : 1401   1-1 5# loss weekly sedentary:  281-0693            1-2# loss weekly lightly active: 003-4826  Protein: 53-66 oz      (1 2-1 5g/kg IBW)  Fluid: 52 oz     (35mL/kg IBW)    Nutrition Diagnosis  Yes; Overweight/obesity  related to Excess energy intake as evidenced by  BMI more than normative standard for age and sex (obesity-grade II 35-39  9)       Nutrition Intervention    Nutrition Prescription  Calories: 1810-1045  Protein: 60-75 gm    Meal Plan (Panda/Pro)  Breakfast: 160, 15  Snack: skip  Lunch: 350, 19  Snack: 100-150, 5-10  Dinner: 300-450, 21  Snack: 100-150, 5-10  +2 tbsp creamer = 70 panda     Nutrition Education:    Healthy Core Manual  Calorie controlled menu  Lean protein food choices  Healthy snack options  Food journaling tips  macronutrients  Physical activity    Nutrition Counseling:  Strategies: meal planning, portion sizes, healthy snack choices, hydration, fiber intake, protein intake, exercise, food journal    Monitoring and Evaluation:  Evaluation criteria:  Energy Intake  Meet protein needs  Maintain adequate hydration  Monitor weekly weight  Meal planning/preparation  Food journal   Decreased portions at mealtimes and snacks  Physical activity     Barriers to learning:none  Readiness to change: Action:  (Changing behavior)  Comprehension: very good  Expected Compliance: very good

## 2020-09-11 ENCOUNTER — OFFICE VISIT (OUTPATIENT)
Dept: BARIATRICS | Facility: CLINIC | Age: 40
End: 2020-09-11

## 2020-09-11 VITALS — HEIGHT: 59 IN | WEIGHT: 174.8 LBS | BODY MASS INDEX: 35.24 KG/M2

## 2020-09-11 DIAGNOSIS — R63.5 ABNORMAL WEIGHT GAIN: Primary | ICD-10-CM

## 2020-09-11 PROCEDURE — RECHECK

## 2020-09-11 NOTE — PROGRESS NOTES
Weight Management Medical Nutrition Assessment  Jaja Pappas is here for f/u RD assessment for Healthy Core program (katherin FRY) alternate  Current wt: 174 8  Lbs, loss of 3 3 lbs x 2 wks with overall loss of 12 7 lbs x 5 wks  Finds she just cannot eat earlier than 10 a m  Food logging and consuming ~1100 calories/day, ~50-55 gm protein/day, 104-120 gm carb  Has not yet used the resistance bands but has been walking  Discussed easy options for protein shakes which may help with overall intake       Patient seen by Medical Provider in past 6 months:  no  Requested to schedule appointment with Medical Provider: already scheduled    Anthropometric Measurements  Start Weight (#): 187 5 lbs 7/20/20  Current wt: 174 8 lbs   %TBW loss: 6 7%   Ideal Body Weight (#): 97 5 lbs  Goal Weight (#): no specific number  Highest: 189 lbs  Lowest: 120 lbs    Weight Loss History  Previous weight loss attempts: Commercial Programs ([employer], Denise Boone, etc )    Food and Nutrition Related History  Wake up: 6 if working; otherwise later   Bed Time: 9-10    Food Recall  Breakfast: coffee w/fairlife creamer     Snack 10:00 bar  Lunch: 12:00   Celery w/1 tbsp pb, cheese stick or turkey stick  OR chili w/beef & beans, 1/2 avocado, greek yogurt as topping OR soup  Snack: 2:00 skip    Dinner: 5:00:  Ham, green beans, mashed potatoes OR ham, baked beans OR 1/2 cottage cheese, mandarin oranges OR salad w/cheese, balsamic  Snack: wasabi almonds or skip    Beverages: water, sugar free beverages, regular soda and coffee/tea  Volume of beverage intake: maybe 50 oz water, 1 cup coffee, occasional soda    Weekends: Same  Cravings: sweets  Trouble area of day: 2 p m  & evening    Frequency of Eating out: irregularly  Food restrictions: n/a  Cooking: self   Food Shopping: self    Physical Activity Intake  Activity: walking 3x/wk  Frequency:several times per week  Physical limitations/barriers to exercise: n/a    Estimated Needs  Energy  [employer] [place] Needs: BMR : 9308   1-1 5# loss weekly sedentary:  883-4650            1-2# loss weekly lightly active: 6111-2081  Protein: 53-66 oz      (1 2-1 5g/kg IBW)  Fluid: 52 oz     (35mL/kg IBW)    Nutrition Diagnosis  Yes; Overweight/obesity  related to Excess energy intake as evidenced by  BMI more than normative standard for age and sex (obesity-grade II 35-39  9)       Nutrition Intervention    Nutrition Prescription  Calories: 5412-9355  Protein: 60-75 gm    Meal Plan (Panda/Pro)  Breakfast: 160, 15  Snack: skip  Lunch: 350, 19  Snack: 100-150, 5-10  Dinner: 300-450, 21  Snack: 100-150, 5-10  +2 tbsp creamer = 70 panda     Nutrition Education:    Healthy Core Manual  Calorie controlled menu  Lean protein food choices  Healthy snack options  Food journaling tips  macronutrients  Physical activity    Nutrition Counseling:  Strategies: meal planning, portion sizes, healthy snack choices, hydration, fiber intake, protein intake, exercise, food journal    Monitoring and Evaluation:  Evaluation criteria:  Energy Intake  Meet protein needs  Maintain adequate hydration  Monitor weekly weight  Meal planning/preparation  Food journal   Decreased portions at mealtimes and snacks  Physical activity     Barriers to learning:none  Readiness to change: Action:  (Changing behavior)  Comprehension: very good  Expected Compliance: very good

## 2020-10-02 ENCOUNTER — OFFICE VISIT (OUTPATIENT)
Dept: BARIATRICS | Facility: CLINIC | Age: 40
End: 2020-10-02

## 2020-10-02 VITALS — WEIGHT: 174 LBS | HEIGHT: 59 IN | BODY MASS INDEX: 35.08 KG/M2

## 2020-10-02 DIAGNOSIS — R63.5 ABNORMAL WEIGHT GAIN: ICD-10-CM

## 2020-10-02 PROCEDURE — WMMFE WEIGHT MANAGEMENT MON FEE EMPLOYEE

## 2020-10-02 PROCEDURE — RECHECK

## 2020-10-16 ENCOUNTER — OFFICE VISIT (OUTPATIENT)
Dept: BARIATRICS | Facility: CLINIC | Age: 40
End: 2020-10-16

## 2020-10-16 VITALS — BODY MASS INDEX: 34.61 KG/M2 | HEIGHT: 59 IN | WEIGHT: 171.7 LBS

## 2020-10-16 DIAGNOSIS — R63.5 ABNORMAL WEIGHT GAIN: Primary | ICD-10-CM

## 2020-10-16 PROCEDURE — RECHECK

## 2020-10-23 ENCOUNTER — OFFICE VISIT (OUTPATIENT)
Dept: BARIATRICS | Facility: CLINIC | Age: 40
End: 2020-10-23
Payer: COMMERCIAL

## 2020-10-23 VITALS
HEART RATE: 76 BPM | HEIGHT: 59 IN | WEIGHT: 169.7 LBS | BODY MASS INDEX: 34.21 KG/M2 | SYSTOLIC BLOOD PRESSURE: 115 MMHG | DIASTOLIC BLOOD PRESSURE: 64 MMHG | TEMPERATURE: 97.1 F | RESPIRATION RATE: 14 BRPM

## 2020-10-23 DIAGNOSIS — E66.9 OBESITY, CLASS I, BMI 30-34.9: Primary | ICD-10-CM

## 2020-10-23 PROBLEM — G43.009 MIGRAINE WITHOUT AURA, NOT INTRACTABLE: Status: ACTIVE | Noted: 2020-10-23

## 2020-10-23 PROBLEM — T78.40XA ALLERGIES: Status: ACTIVE | Noted: 2020-10-23

## 2020-10-23 PROBLEM — J01.00 ACUTE NON-RECURRENT MAXILLARY SINUSITIS: Status: RESOLVED | Noted: 2019-02-14 | Resolved: 2020-10-23

## 2020-10-23 PROBLEM — E66.811 OBESITY, CLASS I, BMI 30-34.9: Status: ACTIVE | Noted: 2020-10-23

## 2020-10-23 PROBLEM — K21.9 GERD (GASTROESOPHAGEAL REFLUX DISEASE): Status: ACTIVE | Noted: 2020-10-23

## 2020-10-23 PROCEDURE — 99242 OFF/OP CONSLTJ NEW/EST SF 20: CPT | Performed by: FAMILY MEDICINE

## 2020-11-13 ENCOUNTER — OFFICE VISIT (OUTPATIENT)
Dept: OBGYN CLINIC | Facility: CLINIC | Age: 40
End: 2020-11-13
Payer: COMMERCIAL

## 2020-11-13 VITALS
DIASTOLIC BLOOD PRESSURE: 60 MMHG | SYSTOLIC BLOOD PRESSURE: 100 MMHG | BODY MASS INDEX: 34.27 KG/M2 | HEIGHT: 59 IN | WEIGHT: 170 LBS

## 2020-11-13 DIAGNOSIS — R87.610 ATYPICAL SQUAMOUS CELL CHANGES OF UNDETERMINED SIGNIFICANCE (ASCUS) ON CERVICAL CYTOLOGY WITH NEGATIVE HIGH RISK HUMAN PAPILLOMA VIRUS (HPV) TEST RESULT: ICD-10-CM

## 2020-11-13 DIAGNOSIS — Z12.4 ENCOUNTER FOR REPEAT PAPANICOLAOU SMEAR OF CERVIX: Primary | ICD-10-CM

## 2020-11-13 PROCEDURE — 99213 OFFICE O/P EST LOW 20 MIN: CPT | Performed by: PHYSICIAN ASSISTANT

## 2020-11-13 PROCEDURE — 88175 CYTOPATH C/V AUTO FLUID REDO: CPT | Performed by: PHYSICIAN ASSISTANT

## 2020-11-18 LAB
LAB AP GYN PRIMARY INTERPRETATION: NORMAL
Lab: NORMAL
PATH INTERP SPEC-IMP: NORMAL

## 2020-11-20 ENCOUNTER — OFFICE VISIT (OUTPATIENT)
Dept: BARIATRICS | Facility: CLINIC | Age: 40
End: 2020-11-20

## 2020-11-20 VITALS — WEIGHT: 167.9 LBS | BODY MASS INDEX: 33.85 KG/M2 | HEIGHT: 59 IN

## 2020-11-20 DIAGNOSIS — R63.5 ABNORMAL WEIGHT GAIN: ICD-10-CM

## 2020-11-20 DIAGNOSIS — B37.3 YEAST INFECTION INVOLVING THE VAGINA AND SURROUNDING AREA: Primary | ICD-10-CM

## 2020-11-20 PROCEDURE — DB3PK

## 2020-11-20 PROCEDURE — RECHECK

## 2020-12-09 DIAGNOSIS — G43.809 OTHER MIGRAINE WITHOUT STATUS MIGRAINOSUS, NOT INTRACTABLE: ICD-10-CM

## 2020-12-09 RX ORDER — ESCITALOPRAM OXALATE 20 MG/1
20 TABLET ORAL DAILY
Qty: 30 TABLET | Refills: 0 | Status: SHIPPED | OUTPATIENT
Start: 2020-12-09 | End: 2021-02-10 | Stop reason: SDUPTHER

## 2020-12-21 ENCOUNTER — TELEPHONE (OUTPATIENT)
Dept: DERMATOLOGY | Facility: CLINIC | Age: 40
End: 2020-12-21

## 2020-12-30 ENCOUNTER — IMMUNIZATIONS (OUTPATIENT)
Dept: FAMILY MEDICINE CLINIC | Facility: HOSPITAL | Age: 40
End: 2020-12-30
Payer: COMMERCIAL

## 2020-12-30 DIAGNOSIS — Z23 ENCOUNTER FOR IMMUNIZATION: ICD-10-CM

## 2020-12-30 PROCEDURE — 0011A SARS-COV-2 / COVID-19 MRNA VACCINE (MODERNA) 100 MCG: CPT

## 2020-12-30 PROCEDURE — 91301 SARS-COV-2 / COVID-19 MRNA VACCINE (MODERNA) 100 MCG: CPT

## 2021-01-08 ENCOUNTER — OFFICE VISIT (OUTPATIENT)
Dept: BARIATRICS | Facility: CLINIC | Age: 41
End: 2021-01-08

## 2021-01-08 VITALS — WEIGHT: 169.2 LBS | BODY MASS INDEX: 34.11 KG/M2 | HEIGHT: 59 IN

## 2021-01-08 DIAGNOSIS — R63.5 ABNORMAL WEIGHT GAIN: Primary | ICD-10-CM

## 2021-01-08 PROCEDURE — RECHECK

## 2021-01-08 NOTE — PROGRESS NOTES
Weight Management Medical Nutrition Assessment  Kim Worthy is here for 2 of 3 bundle  Current wt:   169 2  Lbs, gain of   1 8  lbs x 6 wks and overall loss of 17 8 lbs x 5 1/2 months  Has been playing [email or web address] for activity  Over the last month has not been food logging  Would like to get back to that  Was drinking more soda but has decreased this again  Still eating less at lunch and then finds she is hungry after dinner  Reinforced prior recommendations  Also suspect she is waiting to long for hunger cues  Recommendations provided  She will f/u in 1 month       Patient seen by Medical Provider in past 6 months:  no  Requested to schedule appointment with Medical Provider: already scheduled    Anthropometric Measurements  Start Weight (#): 187 5 lbs 7/20/20  Current wt:  169 2   lbs   %TBW loss:   10%   Ideal Body Weight (#): 97 5 lbs  Goal Weight (#): no specific number  Highest: 189 lbs  Lowest: 120 lbs    Weight Loss History  Previous weight loss attempts: Commercial Programs ([employer], Catarino Palmer, etc )    Food and Nutrition Related History  Wake up: 6 if working; otherwise later   Bed Time: 9-10    Food Recall  Breakfast: coffee w/fairlife creamer     Snack 10:00 bar OR 1 egg, cheese or meat  Lunch: 12:00  Fruit, beef stick, cheese stick   Snack: 2:00 skip    Dinner: 5:00: ?3 oz protein, maybe 1/2 cup carb, veggies OR salad w/short cuts, cheese OR healthy choice power bowl, veggies  Snack: snack bag of doritos    Beverages: water, sugar free beverages, regular soda and coffee/tea  Volume of beverage intake: maybe 32 oz water, 1 cup coffee, occasional soda    Weekends: Same  Cravings: sweets  Trouble area of day: 2 p m  & evening    Frequency of Eating out: irregularly  Food restrictions: n/a  Cooking: self   Food Shopping: self    Physical Activity Intake  Activity: walking, elliptical   Frequency:several times per week  Physical limitations/barriers to exercise: n/a    Estimated Needs  Energy  [employer] [place] Energy Needs: BMR :   3159  1-1 5# loss weekly sedentary: 433-7557              1-2# loss weekly lightly active: 1998-2087  Protein: 53-66 oz      (1 2-1 5g/kg IBW)  Fluid: 52 oz     (35mL/kg IBW)    Nutrition Diagnosis  Yes; Overweight/obesity  related to Excess energy intake as evidenced by  BMI more than normative standard for age and sex (obesity-grade I 26-30  9)       Nutrition Intervention    Nutrition Prescription  Calories: 7320-3185  Protein: 60-75 gm    Meal Plan (Panda/Pro)  Breakfast: 160, 30  Snack: skip  Lunch: 300, 19  Snack: 100-150, 5-10  Dinner: 300, 21  Snack: 100-150, 5-10  +2 tbsp creamer = 70 panda     Nutrition Education:    Healthy Core Manual  Calorie controlled menu  Lean protein food choices  Healthy snack options  Food journaling tips  Physical activity  motivation    Nutrition Counseling:  Strategies: meal planning, portion sizes, healthy snack choices, hydration, fiber intake, protein intake, exercise, food journal    Monitoring and Evaluation:  Evaluation criteria:  Energy Intake  Meet protein needs  Maintain adequate hydration  Monitor weekly weight  Meal planning/preparation  Food journal   Decreased portions at mealtimes and snacks  Physical activity     Barriers to learning:none  Readiness to change: Action:  (Changing behavior) and Relapse: (Returning to older behaviors and abandoning the new changes)   Comprehension: very good  Expected Compliance: very good

## 2021-01-26 ENCOUNTER — IMMUNIZATIONS (OUTPATIENT)
Dept: FAMILY MEDICINE CLINIC | Facility: HOSPITAL | Age: 41
End: 2021-01-26

## 2021-01-26 DIAGNOSIS — Z23 ENCOUNTER FOR IMMUNIZATION: Primary | ICD-10-CM

## 2021-01-26 PROCEDURE — 0012A SARS-COV-2 / COVID-19 MRNA VACCINE (MODERNA) 100 MCG: CPT

## 2021-01-26 PROCEDURE — 91301 SARS-COV-2 / COVID-19 MRNA VACCINE (MODERNA) 100 MCG: CPT

## 2021-02-10 ENCOUNTER — OFFICE VISIT (OUTPATIENT)
Dept: FAMILY MEDICINE CLINIC | Facility: CLINIC | Age: 41
End: 2021-02-10
Payer: COMMERCIAL

## 2021-02-10 VITALS
HEART RATE: 78 BPM | OXYGEN SATURATION: 98 % | BODY MASS INDEX: 33.91 KG/M2 | HEIGHT: 59 IN | DIASTOLIC BLOOD PRESSURE: 70 MMHG | SYSTOLIC BLOOD PRESSURE: 112 MMHG | WEIGHT: 168.2 LBS | TEMPERATURE: 97.7 F

## 2021-02-10 DIAGNOSIS — G43.809 OTHER MIGRAINE WITHOUT STATUS MIGRAINOSUS, NOT INTRACTABLE: ICD-10-CM

## 2021-02-10 DIAGNOSIS — E66.9 OBESITY, CLASS I, BMI 30-34.9: Primary | ICD-10-CM

## 2021-02-10 DIAGNOSIS — G43.009 MIGRAINE WITHOUT AURA AND WITHOUT STATUS MIGRAINOSUS, NOT INTRACTABLE: ICD-10-CM

## 2021-02-10 DIAGNOSIS — K21.9 GASTROESOPHAGEAL REFLUX DISEASE WITHOUT ESOPHAGITIS: ICD-10-CM

## 2021-02-10 PROCEDURE — 99214 OFFICE O/P EST MOD 30 MIN: CPT | Performed by: FAMILY MEDICINE

## 2021-02-10 RX ORDER — ESCITALOPRAM OXALATE 20 MG/1
20 TABLET ORAL DAILY
Qty: 90 TABLET | Refills: 1 | Status: SHIPPED | OUTPATIENT
Start: 2021-02-10 | End: 2021-08-18 | Stop reason: SDUPTHER

## 2021-02-10 NOTE — PROGRESS NOTES
Assessment/Plan:    1  Obesity, Class I, BMI 30-34 9    2  Migraine without aura and without status migrainosus, not intractable    3  Gastroesophageal reflux disease without esophagitis    4  Other migraine without status migrainosus, not intractable  -     escitalopram (LEXAPRO) 20 mg tablet; Take 1 tablet (20 mg total) by mouth daily        There are no Patient Instructions on file for this visit  No follow-ups on file  Subjective:      Patient ID: Ankush Rubin is a 45 y o  female  Chief Complaint   Patient presents with    Follow-up       Taking PPI every other day to control GERD sx  lexapro working well as a preventative measure with meloxicam as a rescue  Triggers for migraines include weather, barometric pressure  No change in h/a with OCP so went back on them  Has had a few abn paps, ASCUS with neg HPV  Mom with uterine CA picked up on a pap  Got her COVID vaccine  Lost 23# since seeing weight mgmt  Wants to hold off labs until employee Guerda Vegas comes around in the spring  Has FMLA paperwork for the headaches  BMI Counseling: Body mass index is 33 97 kg/m²  The BMI is above normal  Nutrition recommendations include decreasing portion sizes, decreasing fast food intake, consuming healthier snacks, limiting drinks that contain sugar and moderation in carbohydrate intake  Exercise recommendations include moderate physical activity 150 minutes/week, exercising 3-5 times per week and strength training exercises  No pharmacotherapy was ordered  Patient referred to weight management due to patient being overweight  See weight mgmt          The following portions of the patient's history were reviewed and updated as appropriate: allergies, current medications, past family history, past medical history, past social history, past surgical history and problem list     Review of Systems   Constitutional: Negative for fatigue and fever  HENT: Negative for congestion      Eyes: Negative for visual disturbance  Respiratory: Negative for chest tightness and shortness of breath  Cardiovascular: Negative for chest pain and palpitations  Gastrointestinal: Negative for abdominal pain  Genitourinary: Negative for difficulty urinating  Musculoskeletal: Negative for arthralgias  Neurological: Positive for headaches  Hematological: Does not bruise/bleed easily  Current Outpatient Medications   Medication Sig Dispense Refill    escitalopram (LEXAPRO) 20 mg tablet Take 1 tablet (20 mg total) by mouth daily 90 tablet 1    loratadine (CLARITIN) 10 mg tablet Take 1 tablet by mouth daily      meloxicam (MOBIC) 15 mg tablet Take 1 tablet (15 mg total) by mouth daily as needed for moderate pain 90 tablet 1    norgestimate-ethinyl estradiol (ORTHO TRI-CYCLEN LO) 0 18/0 215/0 25 MG-25 MCG per tablet Take 1 tablet by mouth daily 90 tablet 4    omeprazole (PriLOSEC) 20 mg delayed release capsule Take 20 mg by mouth daily      PREVIDENT 5000 BOOSTER PLUS 1 1 % PSTE Use as directed  5     No current facility-administered medications for this visit  Objective:    /70 (BP Location: Right arm, Patient Position: Sitting, Cuff Size: Standard)   Pulse 78   Temp 97 7 °F (36 5 °C)   Ht 4' 11" (1 499 m)   Wt 76 3 kg (168 lb 3 2 oz)   LMP 01/20/2021   SpO2 98%   BMI 33 97 kg/m²        Physical Exam  Constitutional:       Appearance: Normal appearance  She is well-developed  Neck:      Musculoskeletal: Normal range of motion and neck supple  Pulmonary:      Effort: Pulmonary effort is normal    Skin:     Coloration: Skin is not pale  Neurological:      Mental Status: She is alert and oriented to person, place, and time  Psychiatric:         Behavior: Behavior normal          Thought Content:  Thought content normal          Judgment: Judgment normal                 Miguel Angel Hull MD

## 2021-02-26 ENCOUNTER — OFFICE VISIT (OUTPATIENT)
Dept: BARIATRICS | Facility: CLINIC | Age: 41
End: 2021-02-26

## 2021-02-26 VITALS — WEIGHT: 166.12 LBS | HEIGHT: 59 IN | BODY MASS INDEX: 33.49 KG/M2

## 2021-02-26 DIAGNOSIS — R63.5 ABNORMAL WEIGHT GAIN: Primary | ICD-10-CM

## 2021-02-26 PROCEDURE — RECHECK

## 2021-02-26 NOTE — PROGRESS NOTES
Weight Management Medical Nutrition Assessment  eJanne Meraz is here for 3 of 3 bundle  Current wt:  166   Lbs,  of  3 2   lbs x 5 wks and overall loss of 21 4 lbs x 7 months  Feels more on track, less unhealthy snacks at night and has also reduced soda ago  Consuming 1/2 little bottle soda when she feels she needs some energy  Has started exercising doing a ladder routine of push ups, sit ups, squats & jumping jacks  Still struggles at times with food logging  Has joined our March  fitness challenge  She will fu in 6 wks       Patient seen by Medical Provider in past 6 months:  no  Requested to schedule appointment with Medical Provider: already scheduled    Anthropometric Measurements  Start Weight (#): 187 5 lbs 7/20/20  Current wt:   166  lbs   %TBW loss:   11 4%   Ideal Body Weight (#): 97 5 lbs  Goal Weight (#): no specific number  Highest: 189 lbs  Lowest: 120 lbs    Weight Loss History  Previous weight loss attempts: Commercial Programs ([employer], [employer], etc )    Food and Nutrition Related History  Wake up: 6 if working; otherwise later   Bed Time: 9-10    Food Recall  Breakfast: coffee w/fairlife creamer still skipping at times but other times english muffin w/pb  Snack 10:00 bar OR 1 egg, cheese or meat  Lunch: 12:00  Fruit, beef stick, cheese stick OR pb, celery, apple, beef stick  Snack: 2:00 skip    Dinner: 5:00: ?3 oz protein, maybe 1/2 cup carb, veggies OR salad w/short cuts, cheese OR healthy choice power bowl, veggies  Snack:     Beverages: water, sugar free beverages, regular soda and coffee/tea  Volume of beverage intake: maybe 32 oz water, 1 cup coffee, occasional soda    Weekends: Same  Cravings: sweets  Trouble area of day: 2 p m  & evening    Frequency of Eating out: irregularly  Food restrictions: n/a  Cooking: self   Food Shopping: self    Physical Activity Intake  Activity: ping pong, ladder exercise (push up, sit up, jumping josh, squat)   Frequency:several times per week  Physical limitations/barriers to exercise: n/a    Estimated Needs  Energy  Seca REE 1458x1  3-4083=320  [employer] [place] Energy Needs: BMR :   4191  1-1 5# loss weekly sedentary: 851-1101              1-2# loss weekly lightly active: 7719-2715  Protein: 53-66 oz      (1 2-1 5g/kg IBW)  Fluid: 52 oz     (35mL/kg IBW)    Nutrition Diagnosis  Yes; Overweight/obesity  related to Excess energy intake as evidenced by  BMI more than normative standard for age and sex (obesity-grade I 26-30  9)       Nutrition Intervention    Nutrition Prescription  Calories: 3469-6115  Protein: 60-75 gm    Meal Plan (Panda/Pro)  Breakfast: 160, 30  Snack: skip  Lunch: 300, 19  Snack: 100-150, 5-10  Dinner: 300, 21  Snack: 100-150, 5-10  +2 tbsp creamer = 70 panda     Nutrition Education:    Healthy Core Manual  Calorie controlled menu  Lean protein food choices  Healthy snack options  Food journaling tips  Physical activity  motivation    Nutrition Counseling:  Strategies: meal planning, portion sizes, healthy snack choices, hydration, fiber intake, protein intake, exercise, food journal    Monitoring and Evaluation:  Evaluation criteria:  Energy Intake  Meet protein needs  Maintain adequate hydration  Monitor weekly weight  Meal planning/preparation  Food journal   Decreased portions at mealtimes and snacks  Physical activity     Barriers to learning:none  Readiness to change: Action:  (Changing behavior)  Comprehension: very good  Expected Compliance: very good

## 2021-03-12 ENCOUNTER — OFFICE VISIT (OUTPATIENT)
Dept: BARIATRICS | Facility: CLINIC | Age: 41
End: 2021-03-12
Payer: COMMERCIAL

## 2021-03-12 VITALS
HEART RATE: 74 BPM | HEIGHT: 59 IN | WEIGHT: 165.4 LBS | TEMPERATURE: 97.9 F | RESPIRATION RATE: 14 BRPM | DIASTOLIC BLOOD PRESSURE: 60 MMHG | SYSTOLIC BLOOD PRESSURE: 128 MMHG | BODY MASS INDEX: 33.34 KG/M2

## 2021-03-12 DIAGNOSIS — E66.9 OBESITY, CLASS I, BMI 30-34.9: Primary | ICD-10-CM

## 2021-03-12 DIAGNOSIS — G43.009 MIGRAINE WITHOUT AURA AND WITHOUT STATUS MIGRAINOSUS, NOT INTRACTABLE: ICD-10-CM

## 2021-03-12 DIAGNOSIS — K21.9 GASTROESOPHAGEAL REFLUX DISEASE WITHOUT ESOPHAGITIS: ICD-10-CM

## 2021-03-12 PROCEDURE — 99214 OFFICE O/P EST MOD 30 MIN: CPT | Performed by: FAMILY MEDICINE

## 2021-03-12 NOTE — PROGRESS NOTES
Assessment/Plan:    No problem-specific Assessment & Plan notes found for this encounter  Diagnoses and all orders for this visit:    Obesity, Class I, BMI 30-34 9    Migraine without aura and without status migrainosus, not intractable    Gastroesophageal reflux disease without esophagitis        -Patient is pursuing [facility] Intervention Program---- > RD bundles  -Initial weight loss goal of 5-10% weight loss for improved health  -Screening labs- 7/26/19  - exercise plan designed today: T, W, Th- elliptical for 20-30min, one day      Initial: 187 5lbs  Current: 165 4lbs (169 7lbs)  Change: -22 1lbs  Goal: no specific number, feel better and fit in clothes better  Size 12 (currently down from 16-->14-->12)   Goals now she would like to lose a little more  Goals:  Food log (ie ) www myfitnesspal com,sparkpeople  com,loseit com,calorieking  com,etc  baritastic  No sugary beverages  At least 64oz of water daily  Increase physical activity by 10 minutes daily  Gradually increase physical activity to a goal of 5 days per week for 30 minutes of MODERATE intensity PLUS 2 days per week of FULL BODY resistance training  3270-6204 calories per day    Nutrition Prescription  Calories: 8456-7744  Protein: 60-75 gm     Follow up in approximately 3 months with Non-Surgical Physician/Advanced Practitioner  Subjective:   Chief Complaint   Patient presents with    Follow-up     4 month MWM follow up       Patient ID: Danni Chaudhari  is a 45 y o  female with excess weight/obesity here to pursue weight management  Patient is pursuing [facility] Program ---> conservative + RD bundles    HPI    4 mo MWM follow up       Doing well  Lost -22lbs since starting       Denies any hunger, or cravings  Still struggles with lack of exercise      She does admits lacks motivation to exercise but has been moving more, walking, increasing her steps, she tracks and is 8,000-10,000 steps a day  Joined the March facebook challenge      We discussed [facility] pharmacology but she is still not interested at the moment  Met initial goals, but Goals now she would like to lose a little more  The following portions of the patient's history were reviewed and updated as appropriate: allergies, current medications, past family history, past medical history, past social history, past surgical history and problem list     Review of Systems   Constitutional: Negative for activity change and appetite change  Respiratory: Negative  Cardiovascular: Negative  Gastrointestinal: Negative  Genitourinary: Negative  Musculoskeletal: Negative for arthralgias  Skin: Negative for rash  Psychiatric/Behavioral: Negative  Objective:    /60 (BP Location: Left arm, Patient Position: Sitting, Cuff Size: Adult)   Pulse 74   Temp 97 9 °F (36 6 °C) (Tympanic)   Resp 14   Ht 4' 11" (1 499 m)   Wt 75 kg (165 lb 6 4 oz)   BMI 33 41 kg/m²      Physical Exam     Constitutional   General appearance: Abnormal   well developed and obese  Eyes No conjunctival pallor     Ears, Nose, Mouth, and Throat Oral mucosa moist    Musculoskeletal   Gait and station: Normal     Psychiatric   Orientation to person, place and time: Normal     Affect: appropriate

## 2021-04-21 NOTE — PROGRESS NOTES
Weight Management Medical Nutrition Assessment  Narciso Nunez is here for 1 of 3 bundle  Current wt:   166 6   Lbs, stable  x 8 wks and overall loss of 20 9 lbs x 9 months  Feels she is going back to old behaviors  Not tracking, planning or exercising  Suggest she write down what motivates her & remember how she feels when things are going well  To manage the work day better suggest she pack her lunch bag as if she were going to work  She felt this may be a good option for her  She also feels lack of planning is causing her to choose easier options like pasta  Her goals for the next month are to plan meals, measure her portions and get more active       Patient seen by Medical Provider in past 6 months:  no  Requested to schedule appointment with Medical Provider: no    Anthropometric Measurements  Start Weight (#): 187 5 lbs 7/20/20  Current wt:    166 6  lbs   %TBW loss:    11 1%   Ideal Body Weight (#): 97 5 lbs  Goal Weight (#): get out of the 160's  Highest: 189 lbs  Lowest: 120 lbs    Weight Loss History  Previous weight loss attempts: Commercial Programs ([employer], [employer], etc )    Food and Nutrition Related History  Wake up: 6 if working; otherwise later   Bed Time: 9-10    Food Recall  Breakfast: coffee w/ creamer 2 tsp, skip   Snack 10:30 bar    Lunch: 12:00  Grazing on whatever is there, might be having things like chips  Snack: 2:00 skip    Dinner: 5:00: pasta (large portion), sauce (not having protein) more often than prior OR eating out  Snack: occasional cereal     Beverages: water, sugar free beverages, regular soda and coffee/tea  Volume of beverage intake: maybe 32 oz water, 1 cup coffee, occasional soda    Weekends: Same  Cravings: sweets  Trouble area of day: 2 p m  & evening    Frequency of Eating out: eating out more ~2x/wk  Food restrictions: n/a  Cooking: self   Food Shopping: self    Physical Activity Intake  Activity: none currently  Frequency:infrequently  Physical limitations/barriers to exercise: n/a    Estimated Needs  Energy  Seca REE 1458x1  3-3747=472  [employer] [place] Energy Needs: BMR :   0727  1-1 5# loss weekly sedentary: 851-1101              1-2# loss weekly lightly active: 8696-3168  Protein: 53-66 oz      (1 2-1 5g/kg IBW)  Fluid: 52 oz     (35mL/kg IBW)    Nutrition Diagnosis  Yes; Overweight/obesity  related to Excess energy intake as evidenced by  BMI more than normative standard for age and sex (obesity-grade I 26-30  9)       Nutrition Intervention    Nutrition Prescription  Calories: 0863-0140  Protein: 60-75 gm    Meal Plan (Panda/Pro)  Breakfast: 160, 30  Snack: skip  Lunch: 300, 19  Snack: 100-150, 5-10  Dinner: 300, 21  Snack: 100-150, 5-10  +2 tbsp creamer = 70 panda     Nutrition Education:    Healthy Core Manual  Calorie controlled menu  Lean protein food choices  Healthy snack options  Food journaling tips  Physical activity  motivation    Nutrition Counseling:  Strategies: meal planning, portion sizes, healthy snack choices, hydration, fiber intake, protein intake, exercise, food journal    Monitoring and Evaluation:  Evaluation criteria:  Energy Intake  Meet protein needs  Maintain adequate hydration  Monitor weekly weight  Meal planning/preparation  Food journal   Decreased portions at mealtimes and snacks  Physical activity     Barriers to learning:none  Readiness to change: Relapse: (Returning to older behaviors and abandoning the new changes)   Comprehension: good  Expected Compliance: good

## 2021-04-23 ENCOUNTER — OFFICE VISIT (OUTPATIENT)
Dept: BARIATRICS | Facility: CLINIC | Age: 41
End: 2021-04-23

## 2021-04-23 VITALS — BODY MASS INDEX: 33.59 KG/M2 | HEIGHT: 59 IN | WEIGHT: 166.6 LBS

## 2021-04-23 DIAGNOSIS — R63.5 ABNORMAL WEIGHT GAIN: ICD-10-CM

## 2021-04-23 PROCEDURE — RECHECK

## 2021-04-23 PROCEDURE — DB3PK

## 2021-05-18 ENCOUNTER — ANNUAL EXAM (OUTPATIENT)
Dept: OBGYN CLINIC | Facility: CLINIC | Age: 41
End: 2021-05-18
Payer: COMMERCIAL

## 2021-05-18 VITALS
BODY MASS INDEX: 34.27 KG/M2 | HEIGHT: 59 IN | DIASTOLIC BLOOD PRESSURE: 78 MMHG | WEIGHT: 170 LBS | SYSTOLIC BLOOD PRESSURE: 124 MMHG

## 2021-05-18 DIAGNOSIS — Z01.419 ENCOUNTER FOR WELL WOMAN EXAM: Primary | ICD-10-CM

## 2021-05-18 DIAGNOSIS — Z12.39 ENCOUNTER FOR SCREENING BREAST EXAMINATION: ICD-10-CM

## 2021-05-18 DIAGNOSIS — Z12.31 ENCOUNTER FOR SCREENING MAMMOGRAM FOR BREAST CANCER: ICD-10-CM

## 2021-05-18 DIAGNOSIS — Z30.41 SURVEILLANCE FOR BIRTH CONTROL, ORAL CONTRACEPTIVES: ICD-10-CM

## 2021-05-18 PROCEDURE — 99395 PREV VISIT EST AGE 18-39: CPT | Performed by: PHYSICIAN ASSISTANT

## 2021-05-18 RX ORDER — NORGESTIMATE AND ETHINYL ESTRADIOL 7DAYSX3 LO
1 KIT ORAL DAILY
Qty: 90 TABLET | Refills: 4 | Status: SHIPPED | OUTPATIENT
Start: 2021-05-18 | End: 2022-02-23 | Stop reason: ALTCHOICE

## 2021-05-18 NOTE — PROGRESS NOTES
Patient is here for yearly exam   Patient is doing well on Ortho Tri-cyclen Lo  Patient is having regular cycles and would like to continue  Patient has no breast concerns and B&B ok  Patient is not due for a pap smear  11/13/20 Normal Pap, +Yeast    5/14/20 ASCUS, Negative HPV    9/18/19 ASCUS, Negative HPV

## 2021-05-18 NOTE — PROGRESS NOTES
Assessment/Plan:    No problem-specific Assessment & Plan notes found for this encounter  Diagnoses and all orders for this visit:    Encounter for well woman exam    Encounter for screening breast examination    Encounter for screening mammogram for breast cancer  -     Mammo screening bilateral w 3d & cad; Future    Surveillance for birth control, oral contraceptives  -     norgestimate-ethinyl estradiol (ORTHO TRI-CYCLEN LO) 0 18/0 215/0 25 MG-25 MCG per tablet; Take 1 tablet by mouth daily          Subjective:      Patient ID: El Barlow is a 45 y o  female  Pt presents for her annual exam today--  She has no complaints  No changes  She has regular bleeding, no pelvic pain--on ocp  Bowel and bladder are regular  No breast concerns today  Last mammo--due in 1 month    No pap today  rx mammo  rx ortho lo      The following portions of the patient's history were reviewed and updated as appropriate: allergies, current medications, past family history, past medical history, past social history, past surgical history and problem list     Review of Systems   Constitutional: Negative for chills, fever and unexpected weight change  Gastrointestinal: Negative for abdominal pain, blood in stool, constipation and diarrhea  Genitourinary: Negative  Objective:      /78   Ht 4' 11" (1 499 m)   Wt 77 1 kg (170 lb)   LMP 05/13/2021 (Exact Date)   Breastfeeding No   BMI 34 34 kg/m²          Physical Exam  Vitals signs and nursing note reviewed  Constitutional:       Appearance: She is well-developed  HENT:      Head: Normocephalic and atraumatic  Neck:      Musculoskeletal: Normal range of motion  Chest:      Breasts:         Right: No inverted nipple, mass, nipple discharge or skin change  Left: No inverted nipple, mass, nipple discharge or skin change  Abdominal:      Palpations: Abdomen is soft  Genitourinary:     Exam position: Supine        Labia:         Right: No rash, tenderness or lesion  Left: No rash, tenderness or lesion  Vagina: Normal       Cervix: No cervical motion tenderness, discharge or friability  Adnexa:         Right: No mass, tenderness or fullness  Left: No mass, tenderness or fullness  Lymphadenopathy:      Lower Body: No right inguinal adenopathy  No left inguinal adenopathy

## 2021-05-24 NOTE — PROGRESS NOTES
Weight Management Medical Nutrition Assessment  Kathi Terrazas is here for 2 of 3 bundle  Current wt:   169 4    Lbs, gain of 2 8  x 1 month and overall loss of 18  lbs x 10 months  Recently attended 2 weddings and does report a lot of indulging including more alcohol over the last week  She does admit that she did not work on the goals we set last visit (write down motivators, planning, pack lunch even when home)  She will work on those goals as she realizes that planning is a large factor in some of her struggle to stay consistent  She will f/u in ~6 wks  Patient seen by Medical Provider in past 6 months:  no  Requested to schedule appointment with Medical Provider: no    Anthropometric Measurements  Start Weight (#): 187 5 lbs 7/20/20  Current wt:     169 4  lbs   %TBW loss:     9 6%   Ideal Body Weight (#): 97 5 lbs  Goal Weight (#): get out of the 160's  Highest: 189 lbs  Lowest: 120 lbs    Weight Loss History  Previous weight loss attempts: Commercial Programs ([employer], [employer], etc )    Food and Nutrition Related History  Wake up: 6 if working; otherwise later   Bed Time: 9-10    Food Recall  Breakfast: coffee w/ creamer 2 tsp, skip   Snack 10:30 bar    Lunch: 12:00  Grazing on whatever is there OR skip   Snack: 2:00 skip    Dinner: 5:00: pasta (large portion), sauce (not having protein) more often than prior OR eating out OR salad, meatballs  Snack: popchips (individual bags)     Beverages: water, sugar free beverages, regular soda and coffee/tea  Volume of beverage intake: maybe 32 oz water, 1 cup coffee, occasional soda    Weekends: Same  Cravings: sweets  Trouble area of day: 2 p m  & evening    Frequency of Eating out:~2x/wk  Food restrictions: n/a  Cooking: self   Food Shopping: self    Physical Activity Intake  Activity: none currently  Frequency:infrequently  Physical limitations/barriers to exercise: n/a    Estimated Needs  Energy  Seca REE 1470x1  3-1949=726  [employer] [place] Energy Needs: BMR :   9578  1-1 5# loss weekly sedentary: 802-3589              1-2# loss weekly lightly active: 8524-9739  Protein: 53-66 oz      (1 2-1 5g/kg IBW)  Fluid: 52 oz     (35mL/kg IBW)    Nutrition Diagnosis  Yes; Overweight/obesity  related to Excess energy intake as evidenced by  BMI more than normative standard for age and sex (obesity-grade I 26-30  9)       Nutrition Intervention    Nutrition Prescription  Calories: 6265-0272  Protein: 60-75 gm    Meal Plan (Panda/Pro)  Breakfast: 160, 30  Snack: skip  Lunch: 300, 19  Snack: 100-150, 5-10  Dinner: 300, 21  Snack: 100-150, 5-10  +2 tbsp creamer = 70 panda     Nutrition Education:    Healthy Core Manual  Calorie controlled menu  Lean protein food choices  Healthy snack options  Food journaling tips  Physical activity  motivation    Nutrition Counseling:  Strategies: meal planning, portion sizes, healthy snack choices, hydration, fiber intake, protein intake, exercise, food journal    Monitoring and Evaluation:  Evaluation criteria:  Energy Intake  Meet protein needs  Maintain adequate hydration  Monitor weekly weight  Meal planning/preparation  Food journal   Decreased portions at mealtimes and snacks  Physical activity     Barriers to learning:none  Readiness to change: Relapse: (Returning to older behaviors and abandoning the new changes)   Comprehension: good  Expected Compliance: good

## 2021-05-28 ENCOUNTER — OFFICE VISIT (OUTPATIENT)
Dept: BARIATRICS | Facility: CLINIC | Age: 41
End: 2021-05-28

## 2021-05-28 VITALS — BODY MASS INDEX: 34.16 KG/M2 | HEIGHT: 59 IN | WEIGHT: 169.43 LBS

## 2021-05-28 DIAGNOSIS — R63.5 ABNORMAL WEIGHT GAIN: Primary | ICD-10-CM

## 2021-05-28 PROCEDURE — RECHECK

## 2021-06-12 ENCOUNTER — TRANSCRIBE ORDERS (OUTPATIENT)
Dept: ADMINISTRATIVE | Facility: HOSPITAL | Age: 41
End: 2021-06-12

## 2021-06-12 ENCOUNTER — APPOINTMENT (OUTPATIENT)
Dept: LAB | Facility: MEDICAL CENTER | Age: 41
End: 2021-06-12

## 2021-06-12 DIAGNOSIS — Z00.8 HEALTH EXAMINATION IN POPULATION SURVEYS: ICD-10-CM

## 2021-06-12 DIAGNOSIS — Z00.8 HEALTH EXAMINATION IN POPULATION SURVEYS: Primary | ICD-10-CM

## 2021-06-12 LAB
CHOLEST SERPL-MCNC: 187 MG/DL (ref 50–200)
EST. AVERAGE GLUCOSE BLD GHB EST-MCNC: 105 MG/DL
HBA1C MFR BLD: 5.3 %
HDLC SERPL-MCNC: 59 MG/DL
LDLC SERPL CALC-MCNC: 100 MG/DL (ref 0–100)
NONHDLC SERPL-MCNC: 128 MG/DL
TRIGL SERPL-MCNC: 141 MG/DL

## 2021-06-12 PROCEDURE — 80061 LIPID PANEL: CPT

## 2021-06-12 PROCEDURE — 36415 COLL VENOUS BLD VENIPUNCTURE: CPT

## 2021-06-12 PROCEDURE — 83036 HEMOGLOBIN GLYCOSYLATED A1C: CPT

## 2021-07-20 DIAGNOSIS — Z30.41 SURVEILLANCE FOR BIRTH CONTROL, ORAL CONTRACEPTIVES: Primary | ICD-10-CM

## 2021-07-20 RX ORDER — NORGESTIMATE AND ETHINYL ESTRADIOL 7DAYSX3 LO
1 KIT ORAL DAILY
Qty: 90 TABLET | Refills: 2 | Status: SHIPPED | OUTPATIENT
Start: 2021-07-20 | End: 2022-02-23 | Stop reason: ALTCHOICE

## 2021-07-20 RX ORDER — NORGESTIMATE AND ETHINYL ESTRADIOL 7DAYSX3 LO
1 KIT ORAL DAILY
Qty: 90 TABLET | Refills: 2 | Status: SHIPPED | OUTPATIENT
Start: 2021-07-20 | End: 2022-04-04 | Stop reason: SDUPTHER

## 2021-08-05 NOTE — PROGRESS NOTES
Weight Management Medical Nutrition Assessment  Tereso Pisano is here for 3 of 3 bundle  Current wt:    170 2   Lbs, she has maintained her weight over the last 2 1/2 months and overall loss of  17 3  lbs x 12 1/2 months  Was recently away, more alcohol but overall feels she did eat better  Has been doing more take out as she still struggles with planning  Has made a list to help with planning but still not implementing this  Eating out a lot, discussed ways to make options healthier  Questions answered re: bread  Suggest going back in [employer] pal to past entries to get back on track  She will f/u in ~5 wks  Patient seen by Medical Provider in past 6 months:  no  Requested to schedule appointment with Medical Provider: no    Anthropometric Measurements  Start Weight (#): 187 5 lbs 7/20/20  Current wt:    170 2   lbs   %TBW loss:      9 2%   Ideal Body Weight (#): 97 5 lbs  Goal Weight (#): get out of the 160's  Highest: 189 lbs  Lowest: 120 lbs    Weight Loss History  Previous weight loss attempts: Commercial Programs ([employer], Racquel Staton, etc )    Food and Nutrition Related History  Wake up: 6 if working; otherwise later   Bed Time: 9-10    Food Recall  Breakfast: coffee w/ creamer 2 tsp, skip   Snack 10:30 granola bar 90 shanice    Lunch: 12:00  Deli meat/cheese, greek yogurt, veggie straws   Snack: 2:00 skip    Dinner: 5:00: 4-5x/wk eating out (burger OR salad OR taco bell power bowl)  Snack: skip    Beverages: water, sugar free beverages, regular soda and coffee/tea  Volume of beverage intake: minimal water, 1 cup coffee, occasional soda    Weekends: Same  Cravings: sweets  Trouble area of day: 2 p m  & evening    Frequency of Eating out:~4-5x/wk  Food restrictions: n/a  Cooking: self   Food Shopping: self    Physical Activity Intake  Activity: walking, swimming  Frequency:infrequently  Physical limitations/barriers to exercise: n/a    Estimated Needs  Energy  Seca REE 1470x1  [phone]=821  [address]  [facility] [place] Needs: BMR :   8173  1-1 5# loss weekly sedentary: 547-6444              1-2# loss weekly lightly active: 6296-5888  Protein: 53-66 oz      (1 2-1 5g/kg IBW)  Fluid: 52 oz     (35mL/kg IBW)    Nutrition Diagnosis  Yes; Overweight/obesity  related to Excess energy intake as evidenced by  BMI more than normative standard for age and sex (obesity-grade I 26-30  9)       Nutrition Intervention    Nutrition Prescription  Calories: 1685-0385  Protein: 60-75 gm    Meal Plan (Panda/Pro)  Breakfast: 160, 30  Snack: skip  Lunch: 300, 19  Snack: 100-150, 5-10  Dinner: 300, 21  Snack: 100-150, 5-10  +2 tbsp creamer = 70 panda     Nutrition Education:    Healthy Core Manual  Calorie controlled menu  Lean protein food choices  Healthy snack options  Food journaling tips  Physical activity  motivation    Nutrition Counseling:  Strategies: meal planning, portion sizes, healthy snack choices, hydration, fiber intake, protein intake, exercise, food journal    Monitoring and Evaluation:  Evaluation criteria:  Energy Intake  Meet protein needs  Maintain adequate hydration  Monitor weekly weight  Meal planning/preparation  Food journal   Decreased portions at mealtimes and snacks  Physical activity     Barriers to learning:none  Readiness to change: Relapse: (Returning to older behaviors and abandoning the new changes)   Comprehension: good  Expected Compliance: good

## 2021-08-13 ENCOUNTER — OFFICE VISIT (OUTPATIENT)
Dept: BARIATRICS | Facility: CLINIC | Age: 41
End: 2021-08-13

## 2021-08-13 VITALS — WEIGHT: 170.2 LBS | HEIGHT: 59 IN | BODY MASS INDEX: 34.31 KG/M2

## 2021-08-13 DIAGNOSIS — R63.5 ABNORMAL WEIGHT GAIN: Primary | ICD-10-CM

## 2021-08-13 PROCEDURE — RECHECK

## 2021-08-18 ENCOUNTER — OFFICE VISIT (OUTPATIENT)
Dept: FAMILY MEDICINE CLINIC | Facility: CLINIC | Age: 41
End: 2021-08-18
Payer: COMMERCIAL

## 2021-08-18 VITALS
WEIGHT: 173.2 LBS | HEART RATE: 70 BPM | TEMPERATURE: 97.5 F | HEIGHT: 59 IN | SYSTOLIC BLOOD PRESSURE: 114 MMHG | OXYGEN SATURATION: 98 % | DIASTOLIC BLOOD PRESSURE: 72 MMHG | BODY MASS INDEX: 34.92 KG/M2

## 2021-08-18 DIAGNOSIS — G43.809 OTHER MIGRAINE WITHOUT STATUS MIGRAINOSUS, NOT INTRACTABLE: ICD-10-CM

## 2021-08-18 PROCEDURE — 99214 OFFICE O/P EST MOD 30 MIN: CPT | Performed by: FAMILY MEDICINE

## 2021-08-18 RX ORDER — ESCITALOPRAM OXALATE 20 MG/1
20 TABLET ORAL DAILY
Qty: 90 TABLET | Refills: 1 | Status: SHIPPED | OUTPATIENT
Start: 2021-08-18 | End: 2022-02-23 | Stop reason: SDUPTHER

## 2021-08-18 NOTE — PROGRESS NOTES
Assessment/Plan:    1  Other migraine without status migrainosus, not intractable  -     escitalopram (LEXAPRO) 20 mg tablet; Take 1 tablet (20 mg total) by mouth daily        There are no Patient Instructions on file for this visit  Return in about 6 months (around 2/18/2022)  Subjective:      Patient ID: Janel Hoang is a 37 y o  female  Chief Complaint   Patient presents with    Follow-up       Here for f/u  BP controlled  Needs updated FMLA paperwork for migraines  Storms have been a trigger, allergies as well  Started using flonase prn  Also using claritin daily  lexapro working well for her migraine prevention  Down to 1-2 migraines per month, uses mobic and tylenol for breakthrough  The following portions of the patient's history were reviewed and updated as appropriate: allergies, current medications, past family history, past medical history, past social history, past surgical history and problem list     Review of Systems   Constitutional: Negative for fatigue and fever  HENT: Negative for congestion  Eyes: Negative for visual disturbance  Respiratory: Negative for chest tightness and shortness of breath  Cardiovascular: Negative for chest pain and palpitations  Gastrointestinal: Negative for abdominal pain  Genitourinary: Negative for difficulty urinating  Musculoskeletal: Negative for arthralgias  Allergic/Immunologic: Positive for environmental allergies  Neurological: Positive for headaches  Hematological: Does not bruise/bleed easily           Current Outpatient Medications   Medication Sig Dispense Refill    escitalopram (LEXAPRO) 20 mg tablet Take 1 tablet (20 mg total) by mouth daily 90 tablet 1    loratadine (CLARITIN) 10 mg tablet Take 1 tablet by mouth daily      meloxicam (MOBIC) 15 mg tablet Take 1 tablet (15 mg total) by mouth daily as needed for moderate pain 90 tablet 1    norgestimate-ethinyl estradiol (ORTHO TRI-CYCLEN LO) 0 18/0 215/0 25 MG-25 MCG per tablet Take 1 tablet by mouth daily 90 tablet 4    norgestimate-ethinyl estradiol (ORTHO TRI-CYCLEN LO) 0 18/0 215/0 25 MG-25 MCG per tablet Take 1 tablet by mouth daily 90 tablet 2    norgestimate-ethinyl estradiol (ORTHO TRI-CYCLEN LO) 0 18/0 215/0 25 MG-25 MCG per tablet Take 1 tablet by mouth daily 90 tablet 2    omeprazole (PriLOSEC) 20 mg delayed release capsule Take 20 mg by mouth daily      PREVIDENT 5000 BOOSTER PLUS 1 1 % PSTE Use as directed  5    norgestimate-ethinyl estradiol (ORTHO TRI-CYCLEN LO) 0 18/0 215/0 25 MG-25 MCG per tablet Take 1 tablet by mouth daily 90 tablet 4     No current facility-administered medications for this visit  Objective:    /72 (BP Location: Right arm, Patient Position: Sitting, Cuff Size: Standard)   Pulse 70   Temp 97 5 °F (36 4 °C)   Ht 4' 11" (1 499 m)   Wt 78 6 kg (173 lb 3 2 oz)   LMP 08/04/2021   SpO2 98%   BMI 34 98 kg/m²        Physical Exam  Vitals reviewed  Constitutional:       Appearance: Normal appearance  She is well-developed  Cardiovascular:      Rate and Rhythm: Normal rate and regular rhythm  Heart sounds: Normal heart sounds  Pulmonary:      Effort: Pulmonary effort is normal       Breath sounds: Normal breath sounds  Skin:     General: Skin is warm  Neurological:      Mental Status: She is alert and oriented to person, place, and time  Psychiatric:         Mood and Affect: Mood normal          Behavior: Behavior normal          Thought Content:  Thought content normal          Judgment: Judgment normal                 Bhaskar Toth MD

## 2021-08-25 ENCOUNTER — AMB VIDEO VISIT (OUTPATIENT)
Dept: OTHER | Facility: HOSPITAL | Age: 41
End: 2021-08-25
Payer: COMMERCIAL

## 2021-08-25 PROCEDURE — ECARE PR SL URGENT CARE VIRTUAL VISIT: Performed by: FAMILY MEDICINE

## 2021-08-25 NOTE — CARE ANYWHERE EVISITS
Visit Summary for [facility] - Gender: Female - Date of Birth: [id number]  Date: [id number] - Duration: 4 minutes  Patient: [facility]  Provider: Reginald Gonzalez    Patient Contact Information  Address  [address]; [address]  [phone]    Visit Topics  woke up with sore red eye [Added By: Self - 2021-08-25]    Triage Questions   What is your current physical address in the event of a medical emergency? Answer []  Are you allergic to any medications? Answer []  Are you now or could you be pregnant? Answer []  Do you have any immune system compromise or chronic lung   disease? Answer []  Do you have any vulnerable family members in the home (infant, pregnant, cancer, elderly)? Answer []     Conversation Transcripts  [0A][0A] [Notification] Josi Huston, Global Staff, will help you prepare for your visit  She is assisting Family Luis Manuel Physician [0A][Yudelka Calvert] Mavis, and thank you for connecting  While you are waiting for the doctor, are there any   questions I can answer for you about our service? Please contact customer service if you have questions about billing, insurance, or technical issues  Visits work best with a stable WiFi connection, so please make sure you are connected before we   [address]   Iâm going to check if there is a provider who can see you more quickly  If so, would you like to be transferred? [0A][Notification] Josi Huston has left the room  [0A][Notification] You are connected with Family [facility] Manuel Physician [0A][Notification] Ines Guaman is located in [place]  [0A][Notification] Ines Guaman has shared health history  Antunez Spruce  [0A]    Diagnosis  Ocular pain, right eye    Procedures  Value: [id number] Code: CPT-4 UNLISTED E&M SERVICE    Medications Prescribed    No prescriptions ordered    Provider Notes  [0A][0A] We strongly encourage you to share the following record of today's visit with your primary care physician   Patient calling from: PA[0A]CC/HPI: Woke up with right eye really red  Flushed it with refresh drops and ruined it out  The eye feels   sore  when she moves her eye around, it hurts  She wears eye makeup but not every day  She has allergies and has a habit of rubbing her eyes, especially at night  Not sure if a cat hair got in there, which is why she flushed the eye  Contacts: No  She   wears glasses and has an eye Doctor  [0A]Allergies:  nkda  [0A]Meds: lexapro, claritin, omeprazole  [0A]Pmx: allergies, reflux, migraines  Psh: No eye surgery  Oral surgery only  Preg/Lac: No  LMP: 3 weeks ago  Fever: NoWeight: N/A[0A]Gen: A&OX3, NAD,   normal mental status and interaction, not toxic  Face: Normal, no periorbital edemaEyes: Right:  PERRLA, EOMI  Vision normal: yes  Conjunctivae injected: yes  Eyes/Eyelashes with discharge or crusting: Clear drainage  Palpebral   erythema/edema: No   Psych: Normal mood and affect  [0A]Assessment and Plan:  Eye pain, suspect corneal abrasion from rubbing while wearing makeup or foreign body  You need to have an in-person eye examination  In the meantime, flush the eye with saline   solution if you have it  If not, you may use water  Bottled water is preferable  The following over the counter lubricating drops and ointments can be very soothing:Refresh lubricant drops, 2 drops four times a day for 5 days as neededRefresh lubricant   ointment, Â½ inch ribbon at bedtime for 5 days as needed[0A]Follow up: Please call your eye doctor  Another option may be your PCP or else urgent care  [0A]If you received a prescription at this visit, it was sent electronically and can be picked up in   about one hour  Call our prescription hotline for questions or concerns 24 hours a day at: [phone]  [0A]Voiced understanding and agreement with the plan  [0A]Additional information can be found at [email or web address] [0A]Please print a copy of   this note and send it to your regular doctor, or take it to your next visit so it may be included in your medical record   [0A]    Electronically signed byJory Piña(NPI [id number])

## 2021-09-09 NOTE — PROGRESS NOTES
Weight Management Medical Nutrition Assessment  Garcia Monsivais is here for 1 of 3 bundle  Current wt:  171 2    Lbs, she has gained 1 lb over the last month and overall loss of  16 3   lbs since July 2020  Has not been logging but is eating out less  Does feel like she is doing more planning  Water intake is improving  Suggestions given for more protein earlier in the day  Works at home 3-4x/wk but finds she is grazing then during the day  Suggest she pack her lunch as if she is going to work to help with grazing  She has not been exercising but does feel she would have time during her day to take a walk  Suggest she start this, even if only 15 minutes/day  She is not interested in medications at this time and did not wish to pursue provider f/u  She will f/u in 3 wks       Patient seen by Medical Provider in past 6 months:  no  Requested to schedule appointment with Medical Provider: no    Anthropometric Measurements  Start Weight (#): 187 5 lbs 7/20/20  Current wt:   171 2   lbs   %TBW loss:       8 7%   Ideal Body Weight (#): 97 5 lbs  Goal Weight (#): get out of the 160's  Highest: 189 lbs  Lowest: 120 lbs    Weight Loss History  Previous weight loss attempts: Commercial Programs ([employer], Curtis Ceron, etc )    Food and Nutrition Related History  Wake up: 6 if working; otherwise later   Bed Time: 9-10    Food Recall  Breakfast: coffee w/ creamer 2 tsp, skip   Snack 10:30 greek yogurt    Lunch: 12:00  Deli meat/cheese, greek yogurt, popchips OR if at home grazing   Snack: 2:00 skip    Dinner: 5:00:  Benny Baz[address] 149 broil/potato   Snack:  Ice cream or oui yogurt    Beverages: water, sugar free beverages, regular soda and coffee/tea  Volume of beverage intake: more water than prior, 1 cup coffee, occasional soda    Weekends: Same  Cravings: sweets  Trouble area of day: 2 p m  & evening    Frequency of Eating out:~1-2x/wk  Food restrictions: n/a  Cooking: self   Food Shopping: self    Physical Activity Intake  Activity: none    Frequency:none  Physical limitations/barriers to exercise: n/a    Estimated Needs  Energy  Seca REE 1470x1  [phone]  [employer] Energy Needs: BMR :   5492  1-1 5# loss weekly sedentary: 500-4365              1-2# loss weekly lightly active: 6965-5889  Protein: 53-66 oz      (1 2-1 5g/kg IBW)  Fluid: 52 oz     (35mL/kg IBW)    Nutrition Diagnosis  Yes; Overweight/obesity  related to Excess energy intake as evidenced by  BMI more than normative standard for age and sex (obesity-grade I 26-30  9)       Nutrition Intervention    Nutrition Prescription  Calories: 1183-0986  Protein: 60-75 gm    Meal Plan (Panda/Pro)  Breakfast: 160, 30  Snack: skip  Lunch: 300, 19  Snack: 100-150, 5-10  Dinner: 300, 21  Snack: 100-150, 5-10  +2 tbsp creamer = 70 panda     Nutrition Education:    Healthy Core Manual  Calorie controlled menu  Lean protein food choices  Healthy snack options  Food journaling tips  Physical activity  motivation    Nutrition Counseling:  Strategies: meal planning, portion sizes, healthy snack choices, hydration, fiber intake, protein intake, exercise, food journal    Monitoring and Evaluation:  Evaluation criteria:  Energy Intake  Meet protein needs  Maintain adequate hydration  Monitor weekly weight  Meal planning/preparation  Food journal   Decreased portions at mealtimes and snacks  Physical activity     Barriers to learning:none  Readiness to change: Relapse: (Returning to older behaviors and abandoning the new changes) & action  Comprehension: good  Expected Compliance: good

## 2021-09-16 ENCOUNTER — OFFICE VISIT (OUTPATIENT)
Dept: BARIATRICS | Facility: CLINIC | Age: 41
End: 2021-09-16

## 2021-09-16 VITALS — HEIGHT: 59 IN | BODY MASS INDEX: 34.51 KG/M2 | WEIGHT: 171.2 LBS

## 2021-09-16 DIAGNOSIS — R63.5 ABNORMAL WEIGHT GAIN: ICD-10-CM

## 2021-09-16 PROCEDURE — DB3PK

## 2021-09-16 PROCEDURE — RECHECK

## 2021-12-22 ENCOUNTER — IMMUNIZATIONS (OUTPATIENT)
Dept: FAMILY MEDICINE CLINIC | Facility: HOSPITAL | Age: 41
End: 2021-12-22

## 2021-12-22 DIAGNOSIS — Z23 ENCOUNTER FOR IMMUNIZATION: Primary | ICD-10-CM

## 2021-12-22 PROCEDURE — 0064A COVID-19 MODERNA VACC 0.25 ML BOOSTER: CPT

## 2021-12-22 PROCEDURE — 91306 COVID-19 MODERNA VACC 0.25 ML BOOSTER: CPT

## 2021-12-27 PROCEDURE — U0005 INFEC AGEN DETEC AMPLI PROBE: HCPCS | Performed by: FAMILY MEDICINE

## 2021-12-27 PROCEDURE — U0003 INFECTIOUS AGENT DETECTION BY NUCLEIC ACID (DNA OR RNA); SEVERE ACUTE RESPIRATORY SYNDROME CORONAVIRUS 2 (SARS-COV-2) (CORONAVIRUS DISEASE [COVID-19]), AMPLIFIED PROBE TECHNIQUE, MAKING USE OF HIGH THROUGHPUT TECHNOLOGIES AS DESCRIBED BY CMS-2020-01-R: HCPCS | Performed by: FAMILY MEDICINE

## 2021-12-28 ENCOUNTER — TELEMEDICINE (OUTPATIENT)
Dept: FAMILY MEDICINE CLINIC | Facility: CLINIC | Age: 41
End: 2021-12-28
Payer: COMMERCIAL

## 2021-12-28 DIAGNOSIS — J06.9 VIRAL UPPER RESPIRATORY TRACT INFECTION: Primary | ICD-10-CM

## 2021-12-28 PROCEDURE — 99214 OFFICE O/P EST MOD 30 MIN: CPT | Performed by: FAMILY MEDICINE

## 2021-12-28 RX ORDER — AZITHROMYCIN 250 MG/1
TABLET, FILM COATED ORAL
Qty: 6 TABLET | Refills: 0 | Status: SHIPPED | OUTPATIENT
Start: 2021-12-28 | End: 2022-01-02

## 2022-02-23 ENCOUNTER — OFFICE VISIT (OUTPATIENT)
Dept: FAMILY MEDICINE CLINIC | Facility: CLINIC | Age: 42
End: 2022-02-23
Payer: COMMERCIAL

## 2022-02-23 VITALS
DIASTOLIC BLOOD PRESSURE: 76 MMHG | OXYGEN SATURATION: 98 % | HEART RATE: 66 BPM | BODY MASS INDEX: 36.45 KG/M2 | SYSTOLIC BLOOD PRESSURE: 122 MMHG | WEIGHT: 180.8 LBS | HEIGHT: 59 IN | TEMPERATURE: 97.6 F

## 2022-02-23 DIAGNOSIS — G43.809 OTHER MIGRAINE WITHOUT STATUS MIGRAINOSUS, NOT INTRACTABLE: ICD-10-CM

## 2022-02-23 DIAGNOSIS — K21.9 GASTROESOPHAGEAL REFLUX DISEASE WITHOUT ESOPHAGITIS: ICD-10-CM

## 2022-02-23 DIAGNOSIS — G43.009 MIGRAINE WITHOUT AURA AND WITHOUT STATUS MIGRAINOSUS, NOT INTRACTABLE: ICD-10-CM

## 2022-02-23 DIAGNOSIS — J30.2 SEASONAL ALLERGIES: ICD-10-CM

## 2022-02-23 DIAGNOSIS — Z00.8 ENCOUNTER FOR BIOMETRIC SCREENING: Primary | ICD-10-CM

## 2022-02-23 PROCEDURE — 99214 OFFICE O/P EST MOD 30 MIN: CPT | Performed by: FAMILY MEDICINE

## 2022-02-23 RX ORDER — MELOXICAM 15 MG/1
15 TABLET ORAL DAILY PRN
Qty: 90 TABLET | Refills: 3 | Status: SHIPPED | OUTPATIENT
Start: 2022-02-23

## 2022-02-23 RX ORDER — ESCITALOPRAM OXALATE 20 MG/1
20 TABLET ORAL DAILY
Qty: 90 TABLET | Refills: 3 | Status: SHIPPED | OUTPATIENT
Start: 2022-02-23

## 2022-02-23 NOTE — PROGRESS NOTES
Assessment/Plan:    1  Encounter for biometric screening  Comments:  get labs done after caring starts with you session begins  Orders:  -     Lipid panel; Future  -     Hemoglobin A1C; Future    2  Other migraine without status migrainosus, not intractable  -     escitalopram (LEXAPRO) 20 mg tablet; Take 1 tablet (20 mg total) by mouth daily  -     meloxicam (MOBIC) 15 mg tablet; Take 1 tablet (15 mg total) by mouth daily as needed for moderate pain    3  Other migraine without status migrainosus, not intractable  Comments:  continue lexapro and mobic prn  Orders:  -     escitalopram (LEXAPRO) 20 mg tablet; Take 1 tablet (20 mg total) by mouth daily  -     meloxicam (MOBIC) 15 mg tablet; Take 1 tablet (15 mg total) by mouth daily as needed for moderate pain    4  Migraine without aura and without status migrainosus, not intractable  -     CBC and differential; Future  -     Comprehensive metabolic panel; Future    5  Gastroesophageal reflux disease without esophagitis  -     CBC and differential; Future  -     Comprehensive metabolic panel; Future    6  Seasonal allergies        There are no Patient Instructions on file for this visit  Return in about 6 months (around 8/23/2022)  Subjective:      Patient ID: Sebastián Martinez is a 37 y o  female  Chief Complaint   Patient presents with    Follow-up       Here for f/u  Allyne Pin working well for her mood  Headaches are treated as needed with meloxicam  Sees GYN for mammo and OCP  Allergies treated yearround with claritin  Uses PPI every other day  Hybrid work schedule  H/a triggers include weather changes and cyclical  Has FMLA paperwork to fill out every 6 months  The following portions of the patient's history were reviewed and updated as appropriate: allergies, current medications, past family history, past medical history, past social history, past surgical history and problem list     Review of Systems   Constitutional: Negative for fatigue and fever  HENT: Negative for congestion  Eyes: Positive for visual disturbance  Respiratory: Negative for chest tightness and shortness of breath  Cardiovascular: Negative for chest pain and palpitations  Gastrointestinal: Positive for nausea  Negative for abdominal pain  Genitourinary: Negative for difficulty urinating  Musculoskeletal: Negative for arthralgias  Neurological: Positive for dizziness and headaches  Hematological: Does not bruise/bleed easily  Psychiatric/Behavioral: Negative for dysphoric mood  The patient is not nervous/anxious  Current Outpatient Medications   Medication Sig Dispense Refill    escitalopram (LEXAPRO) 20 mg tablet Take 1 tablet (20 mg total) by mouth daily 90 tablet 3    loratadine (CLARITIN) 10 mg tablet Take 1 tablet by mouth daily      meloxicam (MOBIC) 15 mg tablet Take 1 tablet (15 mg total) by mouth daily as needed for moderate pain 90 tablet 3    norgestimate-ethinyl estradiol (ORTHO TRI-CYCLEN LO) 0 18/0 215/0 25 MG-25 MCG per tablet Take 1 tablet by mouth daily 90 tablet 2    omeprazole (PriLOSEC) 20 mg delayed release capsule Take 20 mg by mouth daily      PREVIDENT 5000 BOOSTER PLUS 1 1 % PSTE Use as directed  5     No current facility-administered medications for this visit  Objective:    /76 (BP Location: Right arm, Patient Position: Sitting, Cuff Size: Standard)   Pulse 66   Temp 97 6 °F (36 4 °C)   Ht 4' 11" (1 499 m)   Wt 82 kg (180 lb 12 8 oz)   LMP 02/16/2022   SpO2 98%   BMI 36 52 kg/m²        Physical Exam  Vitals reviewed  Constitutional:       Appearance: Normal appearance  She is well-developed  Cardiovascular:      Rate and Rhythm: Normal rate and regular rhythm  Heart sounds: Normal heart sounds  Pulmonary:      Effort: Pulmonary effort is normal       Breath sounds: Normal breath sounds  Skin:     General: Skin is warm     Neurological:      Mental Status: She is alert and oriented to person, place, and time  Psychiatric:         Mood and Affect: Mood normal          Behavior: Behavior normal          Thought Content:  Thought content normal          Judgment: Judgment normal                 Lane Wilcox MD

## 2022-04-04 DIAGNOSIS — Z30.41 SURVEILLANCE FOR BIRTH CONTROL, ORAL CONTRACEPTIVES: ICD-10-CM

## 2022-04-04 RX ORDER — NORGESTIMATE AND ETHINYL ESTRADIOL 7DAYSX3 LO
1 KIT ORAL DAILY
Qty: 90 TABLET | Refills: 0 | Status: SHIPPED | OUTPATIENT
Start: 2022-04-04

## 2022-04-04 RX ORDER — NORGESTIMATE AND ETHINYL ESTRADIOL
KIT
Qty: 84 TABLET | Refills: 0 | Status: SHIPPED | OUTPATIENT
Start: 2022-04-04 | End: 2022-05-19 | Stop reason: SDUPTHER

## 2022-04-04 NOTE — TELEPHONE ENCOUNTER
Patient is requesting a refill for her birth control, Levar Pimentel  Her yearly is scheduled for 5/19/22  Please sign off on refill

## 2022-04-19 ENCOUNTER — HOSPITAL ENCOUNTER (OUTPATIENT)
Dept: RADIOLOGY | Age: 42
Discharge: HOME | End: 2022-04-19
Payer: COMMERCIAL

## 2022-04-19 VITALS — WEIGHT: 180 LBS | HEIGHT: 59 IN | BODY MASS INDEX: 36.29 KG/M2

## 2022-04-19 DIAGNOSIS — Z12.31 ENCOUNTER FOR SCREENING MAMMOGRAM FOR BREAST CANCER: ICD-10-CM

## 2022-04-19 DIAGNOSIS — Z12.31 ENCOUNTER FOR SCREENING MAMMOGRAM FOR MALIGNANT NEOPLASM OF BREAST: ICD-10-CM

## 2022-04-19 PROCEDURE — 77067 SCR MAMMO BI INCL CAD: CPT

## 2022-04-19 PROCEDURE — 77063 BREAST TOMOSYNTHESIS BI: CPT

## 2022-04-27 ENCOUNTER — HOSPITAL ENCOUNTER (OUTPATIENT)
Dept: ULTRASOUND IMAGING | Facility: CLINIC | Age: 42
Discharge: HOME | End: 2022-04-27
Payer: COMMERCIAL

## 2022-04-27 DIAGNOSIS — R92.8 ABNORMAL MAMMOGRAM: ICD-10-CM

## 2022-04-27 PROCEDURE — 76642 ULTRASOUND BREAST LIMITED: CPT

## 2022-05-19 ENCOUNTER — ANNUAL EXAM (OUTPATIENT)
Dept: OBGYN CLINIC | Facility: CLINIC | Age: 42
End: 2022-05-19
Payer: COMMERCIAL

## 2022-05-19 VITALS
SYSTOLIC BLOOD PRESSURE: 122 MMHG | WEIGHT: 181 LBS | HEIGHT: 59 IN | BODY MASS INDEX: 36.49 KG/M2 | DIASTOLIC BLOOD PRESSURE: 80 MMHG

## 2022-05-19 DIAGNOSIS — N60.02 BILATERAL BREAST CYSTS: ICD-10-CM

## 2022-05-19 DIAGNOSIS — R87.610 ATYPICAL SQUAMOUS CELL CHANGES OF UNDETERMINED SIGNIFICANCE (ASCUS) ON CERVICAL CYTOLOGY WITH NEGATIVE HIGH RISK HUMAN PAPILLOMA VIRUS (HPV) TEST RESULT: ICD-10-CM

## 2022-05-19 DIAGNOSIS — N60.01 BILATERAL BREAST CYSTS: ICD-10-CM

## 2022-05-19 DIAGNOSIS — Z12.4 CERVICAL CANCER SCREENING: ICD-10-CM

## 2022-05-19 DIAGNOSIS — Z30.41 SURVEILLANCE FOR BIRTH CONTROL, ORAL CONTRACEPTIVES: ICD-10-CM

## 2022-05-19 DIAGNOSIS — Z12.39 ENCOUNTER FOR SCREENING BREAST EXAMINATION: ICD-10-CM

## 2022-05-19 DIAGNOSIS — Z12.31 ENCOUNTER FOR SCREENING MAMMOGRAM FOR MALIGNANT NEOPLASM OF BREAST: ICD-10-CM

## 2022-05-19 DIAGNOSIS — Z01.419 ENCOUNTER FOR WELL WOMAN EXAM: Primary | ICD-10-CM

## 2022-05-19 PROCEDURE — 88175 CYTOPATH C/V AUTO FLUID REDO: CPT | Performed by: PHYSICIAN ASSISTANT

## 2022-05-19 PROCEDURE — S0612 ANNUAL GYNECOLOGICAL EXAMINA: HCPCS | Performed by: PHYSICIAN ASSISTANT

## 2022-05-19 RX ORDER — NORGESTIMATE AND ETHINYL ESTRADIOL 7DAYSX3 LO
1 KIT ORAL DAILY
Qty: 84 TABLET | Refills: 4 | Status: SHIPPED | OUTPATIENT
Start: 2022-05-19

## 2022-05-19 NOTE — PROGRESS NOTES
The patient is here for a yearly  Pap normal 11/13/2020, pap ASCUS HPV neg 5/14/20, pap ASCUS HPV neg 9/18/19  The patient has regular periods  No vaginal, bowel, bladder or breast problems

## 2022-05-19 NOTE — PROGRESS NOTES
Assessment/Plan:    No problem-specific Assessment & Plan notes found for this encounter  Diagnoses and all orders for this visit:    Encounter for well woman exam    Encounter for screening breast examination    Cervical cancer screening    Encounter for screening mammogram for malignant neoplasm of breast  -     Mammo screening bilateral w 3d & cad; Future    Atypical squamous cell changes of undetermined significance (ASCUS) on cervical cytology with negative high risk human papilloma virus (HPV) test result  -     Liquid-based pap, diagnostic    Surveillance for birth control, oral contraceptives  -     norgestimate-ethinyl estradiol (Tri-Lo-Sprintec) 0 18/0 215/0 25 MG-25 MCG per tablet; Take 1 tablet by mouth in the morning  Bilateral breast cysts          Subjective:      Patient ID: Moni Garzon is a 37 y o  female  Pt presents for her annual exam today--  She has no complaints  She has regular bleeding, no pelvic pain--on Ortho Lo  Bowel and bladder are regular  No breast concerns today  Last mammo--4/22--b/l cyst clusters    pap today  rx mammo  rx ortho lo  Daily flax  Decrease salt and caffeine      The following portions of the patient's history were reviewed and updated as appropriate: allergies, current medications, past family history, past medical history, past social history, past surgical history and problem list     Review of Systems   Constitutional: Negative for chills, fever and unexpected weight change  Gastrointestinal: Negative for abdominal pain, blood in stool, constipation and diarrhea  Genitourinary: Negative  Objective:      /80   Ht 4' 11" (1 499 m)   Wt 82 1 kg (181 lb)   LMP 05/12/2022 (Exact Date)   BMI 36 56 kg/m²          Physical Exam  Vitals and nursing note reviewed  Constitutional:       Appearance: She is well-developed  HENT:      Head: Normocephalic and atraumatic     Chest:   Breasts:      Right: No inverted nipple, mass, nipple discharge or skin change  Left: No inverted nipple, mass, nipple discharge or skin change  Abdominal:      Palpations: Abdomen is soft  Genitourinary:     Exam position: Supine  Labia:         Right: No rash, tenderness or lesion  Left: No rash, tenderness or lesion  Vagina: Normal       Cervix: No cervical motion tenderness, discharge or friability  Adnexa:         Right: No mass, tenderness or fullness  Left: No mass, tenderness or fullness  Musculoskeletal:      Cervical back: Normal range of motion  Lymphadenopathy:      Lower Body: No right inguinal adenopathy  No left inguinal adenopathy

## 2022-05-27 LAB
LAB AP GYN PRIMARY INTERPRETATION: NORMAL
Lab: NORMAL
PATH INTERP SPEC-IMP: NORMAL

## 2022-05-31 DIAGNOSIS — N76.0 BV (BACTERIAL VAGINOSIS): Primary | ICD-10-CM

## 2022-05-31 DIAGNOSIS — B96.89 BV (BACTERIAL VAGINOSIS): Primary | ICD-10-CM

## 2022-05-31 RX ORDER — METRONIDAZOLE 7.5 MG/G
1 GEL VAGINAL
Qty: 70 G | Refills: 0 | Status: SHIPPED | OUTPATIENT
Start: 2022-05-31 | End: 2022-06-05

## 2022-06-15 ENCOUNTER — OFFICE VISIT (OUTPATIENT)
Dept: URGENT CARE | Facility: MEDICAL CENTER | Age: 42
End: 2022-06-15
Payer: COMMERCIAL

## 2022-06-15 ENCOUNTER — APPOINTMENT (OUTPATIENT)
Dept: RADIOLOGY | Facility: MEDICAL CENTER | Age: 42
End: 2022-06-15
Payer: COMMERCIAL

## 2022-06-15 VITALS
HEART RATE: 80 BPM | RESPIRATION RATE: 18 BRPM | HEIGHT: 59 IN | WEIGHT: 180 LBS | BODY MASS INDEX: 36.29 KG/M2 | OXYGEN SATURATION: 99 % | SYSTOLIC BLOOD PRESSURE: 128 MMHG | DIASTOLIC BLOOD PRESSURE: 77 MMHG

## 2022-06-15 DIAGNOSIS — M25.521 RIGHT ELBOW PAIN: ICD-10-CM

## 2022-06-15 DIAGNOSIS — S50.01XA CONTUSION OF RIGHT ELBOW, INITIAL ENCOUNTER: Primary | ICD-10-CM

## 2022-06-15 PROCEDURE — 99213 OFFICE O/P EST LOW 20 MIN: CPT | Performed by: PHYSICIAN ASSISTANT

## 2022-06-15 PROCEDURE — 73080 X-RAY EXAM OF ELBOW: CPT

## 2022-06-15 NOTE — PROGRESS NOTES
[facility]        NAME: Yuly Coleman is a 37 y o  female  : 1980    MRN: [id number]  DATE: Nayana 15, 2022  TIME: 4:17 PM    Assessment and Plan   Contusion of right elbow, initial encounter [S50 01XA]  1  Contusion of right elbow, initial encounter  XR elbow 3+ vw right     - Initial XR did not reveal acute fracture or other osseous abnormality but told patient we will follow up on official read  - Continue ice and Ibuprofen     Patient Instructions       Follow up with PCP in 3-5 days  Proceed to  ER if symptoms worsen  Chief Complaint     Chief Complaint   Patient presents with    Elbow Injury     Patient states approx 4 days ago she slipped on 3 steps walking down landing directly on right elbow; bruising and swelling noted; patient has been taking ibuprofen and icing with minimal relief          History of Present Illness       Patient is a 36 yo female who presents for evaluation of 4 days of right elbow pain following an injury  Reports she fell and landed directly on the elbow  Pain is worse with movement, especially flexion of the elbow  Associated bruising  Denies numbness, tingling, weakness, reduced ROM  Review of Systems   Review of Systems   Constitutional: Negative for fever  Musculoskeletal: Positive for arthralgias  Negative for joint swelling  Right elbow pain   Skin: Positive for color change  Neurological: Negative for dizziness and headaches           Current Medications       Current Outpatient Medications:     escitalopram (LEXAPRO) 20 mg tablet, Take 1 tablet (20 mg total) by mouth daily, Disp: 90 tablet, Rfl: 3    loratadine (CLARITIN) 10 mg tablet, Take 1 tablet by mouth daily, Disp: , Rfl:     meloxicam (MOBIC) 15 mg tablet, Take 1 tablet (15 mg total) by mouth daily as needed for moderate pain, Disp: 90 tablet, Rfl: 3    norgestimate-ethinyl estradiol (ORTHO TRI-CYCLEN LO) 0 18/0 215/0 25 MG-25 MCG per tablet, Take 1 tablet by mouth daily, Disp: 90 tablet, Rfl: 0    norgestimate-ethinyl estradiol (Tri-Lo-Sprintec) 0 18/0 215/0 25 MG-25 MCG per tablet, Take 1 tablet by mouth in the morning , Disp: 84 tablet, Rfl: 4    omeprazole (PriLOSEC) 20 mg delayed release capsule, Take 20 mg by mouth daily, Disp: , Rfl:     PREVIDENT 5000 BOOSTER PLUS 1 1 % PSTE, Use as directed, Disp: , Rfl: 5    Current Allergies     Allergies as of 06/15/2022    (No Known Allergies)            The following portions of the patient's history were reviewed and updated as appropriate: allergies, current medications, past family history, past medical history, past social history, past surgical history and problem list      Past Medical History:   Diagnosis Date    Abnormal Pap smear of cervix     H/o CINI    STEFANIA I (cervical intraepithelial neoplasia I)     GERD (gastroesophageal reflux disease) 10/23/2020    Migraine     Papanicolaou smear 03/09/2018    neg    Radius fracture     Left       Past Surgical History:   Procedure Laterality Date    COLPOSCOPY      GYNECOLOGIC CRYOSURGERY  09/2008    STEFANIA I    TOOTH EXTRACTION N/A 09/01/2018       Family History   Problem Relation Age of Onset    Hypertension Mother     Endometrial cancer Mother     Osteopenia Mother     Hypertension Father     Breast cancer Maternal Grandmother 59    Lung cancer Maternal Grandmother     No Known Problems Maternal Grandfather     Rheum arthritis Paternal Grandmother     Cancer Paternal Grandfather         either stomach, colon or pancreatic-not sure    No Known Problems Maternal Aunt     No Known Problems Maternal Aunt     No Known Problems Maternal Aunt          Medications have been verified  Objective   /77   Pulse 80   Resp 18   Ht 4' 11" (1 499 m)   Wt 81 6 kg (180 lb)   SpO2 99%   BMI 36 36 kg/m²        Physical Exam     Physical Exam  Constitutional:       General: She is not in acute distress  Appearance: She is not toxic-appearing     Cardiovascular: Rate and Rhythm: Normal rate  Pulmonary:      Effort: Pulmonary effort is normal    Musculoskeletal:      Comments: Tenderness to palpation of right olecranon with associated ecchymosis  5/5 strength in the UE  Neurovascularly intact distally  Full ROM noted but pain with flexion     Skin:     General: Skin is warm and dry  Capillary Refill: Capillary refill takes less than 2 seconds  Findings: Bruising present  Comments: Ecchymosis noted over right elbow   Neurological:      Mental Status: She is alert     Psychiatric:         Mood and Affect: Mood normal          Behavior: Behavior normal

## 2022-07-02 ENCOUNTER — APPOINTMENT (OUTPATIENT)
Dept: LAB | Facility: MEDICAL CENTER | Age: 42
End: 2022-07-02
Payer: COMMERCIAL

## 2022-07-02 DIAGNOSIS — G43.009 MIGRAINE WITHOUT AURA AND WITHOUT STATUS MIGRAINOSUS, NOT INTRACTABLE: ICD-10-CM

## 2022-07-02 DIAGNOSIS — K21.9 GASTROESOPHAGEAL REFLUX DISEASE WITHOUT ESOPHAGITIS: ICD-10-CM

## 2022-07-02 DIAGNOSIS — Z00.8 HEALTH EXAMINATION IN POPULATION SURVEY: ICD-10-CM

## 2022-07-02 DIAGNOSIS — Z00.8 ENCOUNTER FOR BIOMETRIC SCREENING: ICD-10-CM

## 2022-07-02 LAB
ALBUMIN SERPL BCP-MCNC: 2.8 G/DL (ref 3.5–5)
ALP SERPL-CCNC: 52 U/L (ref 46–116)
ALT SERPL W P-5'-P-CCNC: 20 U/L (ref 12–78)
ANION GAP SERPL CALCULATED.3IONS-SCNC: 7 MMOL/L (ref 4–13)
AST SERPL W P-5'-P-CCNC: 12 U/L (ref 5–45)
BASOPHILS # BLD AUTO: 0.09 THOUSANDS/ΜL (ref 0–0.1)
BASOPHILS NFR BLD AUTO: 1 % (ref 0–1)
BILIRUB SERPL-MCNC: 0.27 MG/DL (ref 0.2–1)
BUN SERPL-MCNC: 13 MG/DL (ref 5–25)
CALCIUM ALBUM COR SERPL-MCNC: 10.3 MG/DL (ref 8.3–10.1)
CALCIUM SERPL-MCNC: 9.3 MG/DL (ref 8.3–10.1)
CHLORIDE SERPL-SCNC: 104 MMOL/L (ref 100–108)
CHOLEST SERPL-MCNC: 157 MG/DL
CO2 SERPL-SCNC: 24 MMOL/L (ref 21–32)
CREAT SERPL-MCNC: 0.7 MG/DL (ref 0.6–1.3)
EOSINOPHIL # BLD AUTO: 0.2 THOUSAND/ΜL (ref 0–0.61)
EOSINOPHIL NFR BLD AUTO: 2 % (ref 0–6)
ERYTHROCYTE [DISTWIDTH] IN BLOOD BY AUTOMATED COUNT: 15.9 % (ref 11.6–15.1)
EST. AVERAGE GLUCOSE BLD GHB EST-MCNC: 105 MG/DL
GFR SERPL CREATININE-BSD FRML MDRD: 107 ML/MIN/1.73SQ M
GLUCOSE P FAST SERPL-MCNC: 85 MG/DL (ref 65–99)
HBA1C MFR BLD: 5.3 %
HCT VFR BLD AUTO: 39.3 % (ref 34.8–46.1)
HDLC SERPL-MCNC: 59 MG/DL
HGB BLD-MCNC: 12.3 G/DL (ref 11.5–15.4)
IMM GRANULOCYTES # BLD AUTO: 0.02 THOUSAND/UL (ref 0–0.2)
IMM GRANULOCYTES NFR BLD AUTO: 0 % (ref 0–2)
LDLC SERPL CALC-MCNC: 75 MG/DL (ref 0–100)
LYMPHOCYTES # BLD AUTO: 3.02 THOUSANDS/ΜL (ref 0.6–4.47)
LYMPHOCYTES NFR BLD AUTO: 35 % (ref 14–44)
MCH RBC QN AUTO: 28.3 PG (ref 26.8–34.3)
MCHC RBC AUTO-ENTMCNC: 31.3 G/DL (ref 31.4–37.4)
MCV RBC AUTO: 90 FL (ref 82–98)
MONOCYTES # BLD AUTO: 0.53 THOUSAND/ΜL (ref 0.17–1.22)
MONOCYTES NFR BLD AUTO: 6 % (ref 4–12)
NEUTROPHILS # BLD AUTO: 4.67 THOUSANDS/ΜL (ref 1.85–7.62)
NEUTS SEG NFR BLD AUTO: 56 % (ref 43–75)
NONHDLC SERPL-MCNC: 98 MG/DL
NRBC BLD AUTO-RTO: 0 /100 WBCS
PLATELET # BLD AUTO: 541 THOUSANDS/UL (ref 149–390)
PMV BLD AUTO: 9.4 FL (ref 8.9–12.7)
POTASSIUM SERPL-SCNC: 4.3 MMOL/L (ref 3.5–5.3)
PROT SERPL-MCNC: 7.9 G/DL (ref 6.4–8.2)
RBC # BLD AUTO: 4.35 MILLION/UL (ref 3.81–5.12)
SODIUM SERPL-SCNC: 135 MMOL/L (ref 136–145)
TRIGL SERPL-MCNC: 115 MG/DL
WBC # BLD AUTO: 8.53 THOUSAND/UL (ref 4.31–10.16)

## 2022-07-02 PROCEDURE — 83036 HEMOGLOBIN GLYCOSYLATED A1C: CPT

## 2022-07-02 PROCEDURE — 80053 COMPREHEN METABOLIC PANEL: CPT

## 2022-07-02 PROCEDURE — 85025 COMPLETE CBC W/AUTO DIFF WBC: CPT

## 2022-07-02 PROCEDURE — 80061 LIPID PANEL: CPT

## 2022-07-02 PROCEDURE — 36415 COLL VENOUS BLD VENIPUNCTURE: CPT

## 2022-08-20 ENCOUNTER — AMB VIDEO VISIT (OUTPATIENT)
Dept: OTHER | Facility: HOSPITAL | Age: 42
End: 2022-08-20
Payer: COMMERCIAL

## 2022-08-20 PROCEDURE — 99212 OFFICE O/P EST SF 10 MIN: CPT | Performed by: FAMILY MEDICINE

## 2022-08-20 NOTE — CARE ANYWHERE EVISITS
Visit Summary for [facility] - Gender: Female - Date of Birth: [id number]  Date: [id number] - Duration: 8 minutes  Patient: [facility]  Provider: Cristian Welch    Patient Contact Information  Address  [place] [address]  [phone]    Visit Topics  sore throat, swollen tonsil [Added By: Self - 2022-08-20]    Triage Questions   What is your current physical address in the event of a medical emergency? Answer []  Are you allergic to any medications? Answer []  Are you now or could you be pregnant? Answer []  Do you have any immune system compromise or chronic lung   disease? Answer []  Do you have any vulnerable family members in the home (infant, pregnant, cancer, elderly)? Answer []     Conversation Transcripts  [0A][0A] [Notification] You are connected with Cristian Welch, Family Physician [0A][Notification] [facility] is located in [facility]  [0A][Notification] [facility] has shared health history  Tami Garcia  [0A]    Diagnosis  Acute tonsillitis, unspecified    Procedures  Value: [id number] Code: CPT-4 UNLISTED E&M SERVICE    Medications Prescribed    prednisone      Frequency :   Patient Instructions : Tab 4po daily for 2 days, then 3 tab daily for 2 days, then 2 tab po X 2 days, then 1 po daily for 2 days  Refills : 0  Instructions to the Pharmacist : Substitutions allowed    amoxicillin-pot clavulanate      Frequency :   Patient Instructions : tab 1 po bid  Refills : 0  Instructions to the Pharmacist : Substitutions allowed      Provider Notes  [0A][0A] Mode of Communication:  Telemed video[0A]History: The patient has developed a sore throat which has been present for  2 days  [0A][0A]The patient is also experiencing more pain and discomfort  which has been present for  2 days  [0A][0A]Additional pertinent positives: Can swallow food and fluid but painful  sore throat with enlarged, exudative tonsillitis, anterior cervical nodes tender[0A][0A]Pertinent negatives: no temperature taken  ? fever,  covid test negative X2, no   cough or sob  [0A]Past medical history:  migraines  (takes lexapro for prevention), GERD[0A][0A]Medications:  lexapro ( no depression or anxiety, takes this for migraine prevention), birth control , claritin, omeprazole[0A][0A]Allergies:  NKDA[0A]Exam:   [0A][0A]Vitals signs (if available): temperature unknown [0A][0A]Weight:  185 lb, 59", BMI: 37 4[0A][0A]General: appears uncomfortable[0A][0A]Nose:  clear[0A][0A]Pharynx:bilateral enlarged red tonsils with exudates[0A][0A]Sinuses:non   tender[0A][0A]Neck:supple with nodes[0A][0A]Respiratory:no sob, wheezing[0A][0A]Other:  she is able to eat and drink , though painful [0A]Assessment: Acute tonsillitis[0A]Diagnosis code: [0A]Medical decision making:  [0A]    Centor score: [0A]    Absence   of cough           Y     (Add 1 point)  [0A]    Tonsillar exudates         Y     (Add 1 point)  [0A]    Swollen anterior cervical lymph nodes      Y     (Add 1 point)    [0A]    Fever              ?     (Add 1 point)  [0A]    Age < 15           N       (Add 1 point)  [0A][0A]3-4/5 [0A]    Additional considerations: exudative tonsillitis  [0A][0A]Plan: [0A]    Medications: [0A]    augmentin, prednisone  [0A]    Home care: [0A]    Acetaminophen  as needed for pain or fever [0A]    May also use OTC throat   sprays, gargle, or lozenges [0A] [0A]    Referral or follow up: [0A]    As needed for worsening or if no improvement   Consider UC or ER if more difficulty with pain or swallowing  and for culture of throat  [9X]UJHUPMUM choice  (if applicable):    augmentin-cut in pieces to take due to large pill [0A]Additional recommendations: [0A]    If you received a prescription at this visit and you have a question or problem please call [phone] for prescription assistance [0A]    Please print a copy of   this note and send it to your regular doctor, or take it to your next visit so it may be included in your medical record [0A]    Please see your primary care provider on an annual basis or more frequently if directed [0A]The patient voiced understanding   and agreement with plan  [0A]    Electronically signed by: Martha Bower(NPI [id number])

## 2022-08-31 ENCOUNTER — OFFICE VISIT (OUTPATIENT)
Dept: FAMILY MEDICINE CLINIC | Facility: CLINIC | Age: 42
End: 2022-08-31
Payer: COMMERCIAL

## 2022-08-31 VITALS
DIASTOLIC BLOOD PRESSURE: 72 MMHG | BODY MASS INDEX: 37.5 KG/M2 | SYSTOLIC BLOOD PRESSURE: 124 MMHG | HEIGHT: 59 IN | WEIGHT: 186 LBS | TEMPERATURE: 97.8 F | OXYGEN SATURATION: 97 % | HEART RATE: 76 BPM

## 2022-08-31 DIAGNOSIS — K21.9 GASTROESOPHAGEAL REFLUX DISEASE WITHOUT ESOPHAGITIS: ICD-10-CM

## 2022-08-31 DIAGNOSIS — D68.59 THROMBOPHILIA (HCC): Primary | ICD-10-CM

## 2022-08-31 DIAGNOSIS — E66.01 CLASS 2 SEVERE OBESITY DUE TO EXCESS CALORIES WITH SERIOUS COMORBIDITY AND BODY MASS INDEX (BMI) OF 37.0 TO 37.9 IN ADULT (HCC): ICD-10-CM

## 2022-08-31 DIAGNOSIS — G43.009 MIGRAINE WITHOUT AURA AND WITHOUT STATUS MIGRAINOSUS, NOT INTRACTABLE: ICD-10-CM

## 2022-08-31 DIAGNOSIS — E83.52 HYPERCALCEMIA: ICD-10-CM

## 2022-08-31 PROCEDURE — 99214 OFFICE O/P EST MOD 30 MIN: CPT | Performed by: FAMILY MEDICINE

## 2022-08-31 NOTE — PROGRESS NOTES
Assessment/Plan:    1  Thrombophilia ([facility] )  Comments:  consider baby ASA few times a week  Orders:  -     CBC and differential; Future; Expected date: 12/12/2022    2  Migraine without aura and without status migrainosus, not intractable    3  Gastroesophageal reflux disease without esophagitis    4  Class 2 severe obesity due to excess calories with serious comorbidity and body mass index (BMI) of 37 0 to 37 9 in adult ([facility] )    5  Hypercalcemia  -     Basic metabolic panel; Future; Expected date: 12/12/2022        There are no Patient Instructions on file for this visit  Return in about 6 months (around 2/28/2023)  Subjective:      Patient ID: Floewr Alexandra is a 40 y o  female  Chief Complaint   Patient presents with    Follow-up       Here for f/u  Migraines have been under good control  Only missing 2 days in the alst 6 months  FMLA papers need renewal today  using lexapro to prevent migraines, uses mobic for abortive therapy  No relation to menses  Using PPI daily now, GERD worsened  With recent weight gain, otherwise was able to take qod  Lost weight with medical wt loss, food logging, low calorie  The following portions of the patient's history were reviewed and updated as appropriate: allergies, current medications, past family history, past medical history, past social history, past surgical history and problem list     Review of Systems   Constitutional: Negative for fatigue and fever  HENT: Negative for congestion  Eyes: Negative for visual disturbance  Respiratory: Negative for chest tightness and shortness of breath  Cardiovascular: Negative for chest pain and palpitations  Gastrointestinal: Negative for abdominal pain, constipation, diarrhea and vomiting  Genitourinary: Negative for difficulty urinating  Musculoskeletal: Negative for arthralgias  Neurological: Positive for headaches  Hematological: Does not bruise/bleed easily           Current Outpatient Medications Medication Sig Dispense Refill    escitalopram (LEXAPRO) 20 mg tablet Take 1 tablet (20 mg total) by mouth daily 90 tablet 3    loratadine (CLARITIN) 10 mg tablet Take 1 tablet by mouth daily      meloxicam (MOBIC) 15 mg tablet Take 1 tablet (15 mg total) by mouth daily as needed for moderate pain 90 tablet 3    norgestimate-ethinyl estradiol (Tri-Lo-Sprintec) 0 18/0 215/0 25 MG-25 MCG per tablet Take 1 tablet by mouth in the morning  84 tablet 4    omeprazole (PriLOSEC) 20 mg delayed release capsule Take 20 mg by mouth daily      PREVIDENT 5000 BOOSTER PLUS 1 1 % PSTE Use as directed  5     No current facility-administered medications for this visit  Objective:    /72 (BP Location: Right arm, Patient Position: Sitting, Cuff Size: Standard)   Pulse 76   Temp 97 8 °F (36 6 °C)   Ht 4' 11" (1 499 m)   Wt 84 4 kg (186 lb)   LMP 08/03/2022   SpO2 97%   BMI 37 57 kg/m²        Physical Exam  Vitals reviewed  Constitutional:       Appearance: Normal appearance  She is well-developed  Cardiovascular:      Rate and Rhythm: Normal rate and regular rhythm  Heart sounds: Normal heart sounds  Pulmonary:      Effort: Pulmonary effort is normal       Breath sounds: Normal breath sounds  Skin:     General: Skin is warm  Neurological:      Mental Status: She is alert and oriented to person, place, and time  Psychiatric:         Mood and Affect: Mood normal          Behavior: Behavior normal          Thought Content:  Thought content normal          Judgment: Judgment normal                 Flor Mcclain MD

## 2022-09-19 DIAGNOSIS — G43.809 OTHER MIGRAINE WITHOUT STATUS MIGRAINOSUS, NOT INTRACTABLE: ICD-10-CM

## 2022-09-19 RX ORDER — ESCITALOPRAM OXALATE 20 MG/1
20 TABLET ORAL DAILY
Qty: 90 TABLET | Refills: 0 | Status: SHIPPED | OUTPATIENT
Start: 2022-09-19

## 2022-09-26 ENCOUNTER — TELEMEDICINE (OUTPATIENT)
Dept: FAMILY MEDICINE CLINIC | Facility: CLINIC | Age: 42
End: 2022-09-26
Payer: COMMERCIAL

## 2022-09-26 DIAGNOSIS — U07.1 COVID-19: Primary | ICD-10-CM

## 2022-09-26 PROCEDURE — 99214 OFFICE O/P EST MOD 30 MIN: CPT | Performed by: INTERNAL MEDICINE

## 2022-09-26 NOTE — PROGRESS NOTES
Virtual Regular Visit    Verification of patient location:    Patient is located in the following state in which I hold an active license [place]      Assessment/Plan:    Problem List Items Addressed This Visit    None     Visit Diagnoses     COVID-19    -  Primary    Relevant Medications    molnupiravir 200 mg capsule          BMI Counseling: There is no height or weight on file to calculate BMI  The BMI is above normal  Nutrition recommendations include decreasing portion sizes and limiting drinks that contain sugar  Exercise recommendations include exercising 3-5 times per week  No pharmacotherapy was ordered  Patient referred to PCP  Rationale for BMI follow-up plan is due to patient being overweight or obese  Reason for visit is   Chief Complaint   Patient presents with    Virtual Regular Visit        Encounter provider Gelacio Denis DO    Provider located at [address] [place] [id number] [phone]      Recent Visits  Date Type Provider Dept   09/26/22 Telemedicine DO Roman Davalos   Showing recent visits within past 7 days and meeting all other requirements  Future Appointments  No visits were found meeting these conditions  Showing future appointments within next 150 days and meeting all other requirements       The patient was identified by name and date of birth  Dorcas Mention was informed that this is a telemedicine visit and that the visit is being conducted through [address] [place] [address] Now and patient was informed that this is a secure, HIPAA-compliant platform  She agrees to proceed     My office door was closed  No one else was in the room  She acknowledged consent and understanding of privacy and security of the video platform  The patient has agreed to participate and understands they can discontinue the visit at any time  Patient is aware this is a billable service       Wellington Zacarias is a 40 y o  female    Patient complains of feeling ill since Saturday she has had a fever of 100 2 as of this a m  She complains of cough productive at times she gets intermittent shortness of breath she has had her COVID vaccine and 1 booster she does not have an O2 sat will come to the office denies diarrhea discussed she must isolate for 5 days and free of fever 24 hours she may go out of her isolation for 5 minutes but wear a mask for 5 more days also complains of headache sinus pressure sore throat denies pregnancy she is on birth control    See hpi       Past Medical History:   Diagnosis Date    Abnormal Pap smear of cervix     H/o CINI    STEFANIA I (cervical intraepithelial neoplasia I)     GERD (gastroesophageal reflux disease) 10/23/2020    Migraine     Papanicolaou smear 03/09/2018    neg    Radius fracture     Left       Past Surgical History:   Procedure Laterality Date    COLPOSCOPY      GYNECOLOGIC CRYOSURGERY  09/2008    STEFANIA I    TOOTH EXTRACTION N/A 09/01/2018       Current Outpatient Medications   Medication Sig Dispense Refill    molnupiravir 200 mg capsule Take 4 capsules (800 mg total) by mouth every 12 (twelve) hours for 5 days 40 capsule 0    escitalopram (LEXAPRO) 20 mg tablet Take 1 tablet (20 mg total) by mouth daily 90 tablet 0    loratadine (CLARITIN) 10 mg tablet Take 1 tablet by mouth daily      meloxicam (MOBIC) 15 mg tablet Take 1 tablet (15 mg total) by mouth daily as needed for moderate pain 90 tablet 3    norgestimate-ethinyl estradiol (Tri-Lo-Sprintec) 0 18/0 215/0 25 MG-25 MCG per tablet Take 1 tablet by mouth in the morning  84 tablet 4    omeprazole (PriLOSEC) 20 mg delayed release capsule Take 20 mg by mouth daily      PREVIDENT 5000 BOOSTER PLUS 1 1 % PSTE Use as directed  5     No current facility-administered medications for this visit  No Known Allergies    Review of Systems   Constitutional: Positive for fever  HENT: Positive for sinus pressure and sore throat  Respiratory: Positive for cough and shortness of breath  Gastrointestinal: Negative for diarrhea  Video Exam    There were no vitals filed for this visit  Physical Exam  Constitutional:       Appearance: Normal appearance  HENT:      Head: Normocephalic and atraumatic  Pulmonary:      Effort: No respiratory distress  Musculoskeletal:      Cervical back: Neck supple  Lymphadenopathy:      Cervical: No cervical adenopathy  Neurological:      Mental Status: She is alert            I spent 8 minutes directly with the patient during this visit

## 2022-09-30 ENCOUNTER — TELEPHONE (OUTPATIENT)
Dept: FAMILY MEDICINE CLINIC | Facility: CLINIC | Age: 42
End: 2022-09-30

## 2022-09-30 NOTE — TELEPHONE ENCOUNTER
Patient would like a work note placed into [employer] to be out of work 9/26/22 until 9/30, able to return to work Monday 10/3 due to covid

## 2022-10-12 PROBLEM — J06.9 VIRAL UPPER RESPIRATORY TRACT INFECTION: Status: RESOLVED | Noted: 2021-12-28 | Resolved: 2022-10-12

## 2022-10-27 ENCOUNTER — HOSPITAL ENCOUNTER (OUTPATIENT)
Dept: ULTRASOUND IMAGING | Facility: CLINIC | Age: 42
End: 2022-10-27
Payer: COMMERCIAL

## 2022-10-27 ENCOUNTER — HOSPITAL ENCOUNTER (OUTPATIENT)
Dept: MAMMOGRAPHY | Facility: CLINIC | Age: 42
End: 2022-10-27
Payer: COMMERCIAL

## 2022-10-27 VITALS — BODY MASS INDEX: 37.5 KG/M2 | WEIGHT: 186 LBS | HEIGHT: 59 IN

## 2022-10-27 DIAGNOSIS — R92.8 ABNORMAL MAMMOGRAM: ICD-10-CM

## 2022-10-27 PROCEDURE — 76642 ULTRASOUND BREAST LIMITED: CPT

## 2022-10-27 PROCEDURE — 77066 DX MAMMO INCL CAD BI: CPT

## 2022-10-27 PROCEDURE — G0279 TOMOSYNTHESIS, MAMMO: HCPCS

## 2022-11-05 ENCOUNTER — APPOINTMENT (OUTPATIENT)
Dept: LAB | Facility: MEDICAL CENTER | Age: 42
End: 2022-11-05

## 2022-11-05 DIAGNOSIS — D68.59 THROMBOPHILIA (HCC): ICD-10-CM

## 2022-11-05 DIAGNOSIS — E83.52 HYPERCALCEMIA: ICD-10-CM

## 2022-11-05 LAB
ANION GAP SERPL CALCULATED.3IONS-SCNC: 3 MMOL/L (ref 4–13)
BASOPHILS # BLD AUTO: 0.09 THOUSANDS/ÂΜL (ref 0–0.1)
BASOPHILS NFR BLD AUTO: 1 % (ref 0–1)
BUN SERPL-MCNC: 13 MG/DL (ref 5–25)
CALCIUM SERPL-MCNC: 9.1 MG/DL (ref 8.3–10.1)
CHLORIDE SERPL-SCNC: 104 MMOL/L (ref 96–108)
CO2 SERPL-SCNC: 26 MMOL/L (ref 21–32)
CREAT SERPL-MCNC: 0.77 MG/DL (ref 0.6–1.3)
EOSINOPHIL # BLD AUTO: 0.18 THOUSAND/ÂΜL (ref 0–0.61)
EOSINOPHIL NFR BLD AUTO: 2 % (ref 0–6)
ERYTHROCYTE [DISTWIDTH] IN BLOOD BY AUTOMATED COUNT: 15.4 % (ref 11.6–15.1)
GFR SERPL CREATININE-BSD FRML MDRD: 96 ML/MIN/1.73SQ M
GLUCOSE P FAST SERPL-MCNC: 96 MG/DL (ref 65–99)
HCT VFR BLD AUTO: 37.6 % (ref 34.8–46.1)
HGB BLD-MCNC: 11.7 G/DL (ref 11.5–15.4)
IMM GRANULOCYTES # BLD AUTO: 0.03 THOUSAND/UL (ref 0–0.2)
IMM GRANULOCYTES NFR BLD AUTO: 0 % (ref 0–2)
LYMPHOCYTES # BLD AUTO: 2.89 THOUSANDS/ÂΜL (ref 0.6–4.47)
LYMPHOCYTES NFR BLD AUTO: 29 % (ref 14–44)
MCH RBC QN AUTO: 27.7 PG (ref 26.8–34.3)
MCHC RBC AUTO-ENTMCNC: 31.1 G/DL (ref 31.4–37.4)
MCV RBC AUTO: 89 FL (ref 82–98)
MONOCYTES # BLD AUTO: 0.6 THOUSAND/ÂΜL (ref 0.17–1.22)
MONOCYTES NFR BLD AUTO: 6 % (ref 4–12)
NEUTROPHILS # BLD AUTO: 6.23 THOUSANDS/ÂΜL (ref 1.85–7.62)
NEUTS SEG NFR BLD AUTO: 62 % (ref 43–75)
NRBC BLD AUTO-RTO: 0 /100 WBCS
PLATELET # BLD AUTO: 500 THOUSANDS/UL (ref 149–390)
PMV BLD AUTO: 9.4 FL (ref 8.9–12.7)
POTASSIUM SERPL-SCNC: 4.2 MMOL/L (ref 3.5–5.3)
RBC # BLD AUTO: 4.22 MILLION/UL (ref 3.81–5.12)
SODIUM SERPL-SCNC: 133 MMOL/L (ref 135–147)
WBC # BLD AUTO: 10.02 THOUSAND/UL (ref 4.31–10.16)

## 2022-12-06 DIAGNOSIS — G43.809 OTHER MIGRAINE WITHOUT STATUS MIGRAINOSUS, NOT INTRACTABLE: ICD-10-CM

## 2022-12-06 RX ORDER — ESCITALOPRAM OXALATE 20 MG/1
20 TABLET ORAL DAILY
Qty: 90 TABLET | Refills: 0 | Status: SHIPPED | OUTPATIENT
Start: 2022-12-06

## 2022-12-06 RX ORDER — MELOXICAM 15 MG/1
15 TABLET ORAL DAILY PRN
Qty: 90 TABLET | Refills: 0 | Status: SHIPPED | OUTPATIENT
Start: 2022-12-06

## 2023-01-20 NOTE — PROGRESS NOTES
Lifestyle Medicine Office Visit  Luis Manuel Gaytan 40 y o  female   MRN: [id number] : 1980  ENCOUNTER: 2023 10:45 AM    Assessment and Plan   Lifestyle Medicine Interventions - Darcy and I collaborated to create the following plan:    Nutrition  Saman Fowler will stop snacking after 6 PM, and will begin to introduce some form of breakfast (banana, yogurt, whole-grain bread with nut butter)  [address] will keep her scheduled appointment with her PCP to discuss her current medication regimen, as she feels that there was some connection between initiation of escitalopram and her anxiety  We also discussed behavioral therapy, which she is open to  I also recommended use of [employer]  Physical Activity  We will return to physical activity at her next visit, as Saman Fowler is not yet ready to make change  She will think through options for how to incorporate physical activity before next visit  We will regroup in 6 weeks    Reason for Visit     Chief Complaint   Patient presents with   • Lifestyle Medicine       History of Present Illness   Luis Manuel Gaytan is a 40y o -year-old female who presents today for [facility]  Darcy's primary reason for this visit is a desire to manage her weight, mental health, and overall health  Saw Weight Mgmt 3 y/a, lost weight (25lb), regained most of it  Mental Health:  Has been feeling more anxious for over 6mo  Loves her work, and there have been no obvious precipitating factors  Feeling more emotional   Very easily overwhelmed  Occasional alcohol, no rec drugs  1-2c coffee in the AM   Some soda, no energy drinks  Has done some limited face-to-face therapy with limited benefit  Sleep:  Sleeping better with weighted blanket  Tends to have similar bedtime and wakeup time  Quality is good in general     Physical Activity:  Very little at this time  Does not enjoy exercising, but likes to walk  Has an elliptical at home        Nutrition:  [place] - one can several times/week  Doesn't like drinking water  Likes all food, does not avoid anything  Likes fruit/vegetables  Red meat several days/week (steak or ground beef); eats fish at restaurant; eats some poultry  Does consume dairy, "loves cheese "    Does not plan meals with regularity  Does not eat breakfast   Lunch - often leftover takeout  Takeout 3-4 days/week  Snacks: enjoys snacks (sweet and salty), often later in the day  Support:  Lives with Mom, cooks for the two  Friends  Lifestyle Assessment    Flowsheet Row Most Recent Value   Health    What is your current overall level of health? 7   Sleep    Over the last two weeks, how many hours of sleep did you average in a 24-hour period? 0- 7-8 hours   Over the last two weeks, how often did you feel tired or have difficulty staying awake during routine tasks in the day? 1- Several days   Weight Management    What do you think about your current weight? 2 -I want to gain a lot of weight or I want to lose a lot of weight   Nutrition    Over the last two weeks, how often have you eaten fast food, sugary drinks or packaged foods? Example: soda, sports drinks, juice, chips, candy, crackers, cookies, etc  3- Nearly every day   On an average day, how many servings of whole fruits and vegetables do you eat? 1 serving is about a handful and does not include fruit juice  2- 2-3 servings   Exercise    Over the last two weeks, how many days did you exercise at a moderate to strenuous intensity? Example: brisk walking or enough movement to break a light sweat 3- Less than 1 time per week   During an average session, how many minutes do you exercise at a moderate to strenuous intensity? Example: brisk walking or enough movement to break a light sweat 3- Less than 10 minutes   Purpose & Connection / Mental Health    Over the past 2 weeks, how often have you felt like your life had purpose or meaning?  2- Several days   Over the past 2 weeks, how often have you connected with any support network? Example: community, spiritual, friends/family, nature, yoga, or meditation  2- Several days   Over the past 2 weeks, how often have you been bothered by little interest or pleasure in doing things? 2- More than half the days   Over the past 2 weeks, how often have you been bothered by feeling down, depressed or hopeless? 3- Nearly every day   Over the past 2 weeks, how often have you been bothered by feeling nervous, anxious or on edge? 3- Nearly every day   Over the past 2 weeks, how often have you been bothered by worrying too much about different things? 3- Nearly every day   Smoking and Substance Abuse    Have you used nicotine in the past year? 0- No   Have you used alcohol in the past year? 1- Yes   For alcohol use, what level of concern do you have regarding use of the substance? 0 - No Concern   How much alcohol do you use? less than 1 per day   Have you used recreational drugs in the past year? 0- No   Have you used marijuana in the past year? 0- No   Motivation    First area you are most motivated to change in order to improve your current overall level of health? Mental Health-1   Second area you are most motivated to change in order to improve your current overall level of health? Weight Management-2   Third area you are most motivated to change in order to improve your current overall level of health? Nutrition-3   What motivates you to be healthier? unhappy with myself in many areas   Overall Behavioral Lifestyle Profile Score    Score 30          Review of Systems   Review of Systems   Constitutional: Negative for activity change, chills, fatigue and fever  HENT: Negative for congestion, sinus pressure, sinus pain and sore throat  Respiratory: Negative for cough, shortness of breath and wheezing  Cardiovascular: Negative for chest pain, palpitations and leg swelling  Gastrointestinal: Negative for abdominal pain, diarrhea, nausea and vomiting  Genitourinary: Negative for decreased urine volume, dysuria, frequency and urgency  Musculoskeletal: Negative for arthralgias, myalgias, neck pain and neck stiffness  Skin: Negative for rash  Neurological: Negative for dizziness, light-headedness, numbness and headaches  Psychiatric/Behavioral: The patient is nervous/anxious  Active Problem List     Patient Active Problem List   Diagnosis   • Obesity, Class I, BMI 30-34 9   • Migraine without aura, not intractable   • GERD (gastroesophageal reflux disease)   • Allergies       Past Medical History, Past Surgical History, Family History, and Social History were reviewed and updated today as appropriate  Objective   /80   Pulse 65   Ht 4' 11" (1 499 m)   Wt 86 6 kg (191 lb)   SpO2 98%   BMI 38 58 kg/m²     Physical Exam  Constitutional:       Appearance: Normal appearance  HENT:      Head: Normocephalic and atraumatic  Eyes:      Conjunctiva/sclera: Conjunctivae normal       Pupils: Pupils are equal, round, and reactive to light  Cardiovascular:      Rate and Rhythm: Normal rate and regular rhythm  Heart sounds: No murmur heard  No friction rub  Pulmonary:      Effort: Pulmonary effort is normal  No respiratory distress  Breath sounds: Normal breath sounds  Neurological:      Mental Status: She is alert and oriented to person, place, and time           Pertinent Laboratory/Diagnostic Studies:  Lab Results   Component Value Date    BUN 13 11/05/2022    CREATININE 0 77 11/05/2022    CALCIUM 9 1 11/05/2022    K 4 2 11/05/2022    CO2 26 11/05/2022     11/05/2022       No results found for: TSH    Lab Results   Component Value Date    CHOL 155 07/07/2015     Lab Results   Component Value Date    TRIG 115 07/02/2022     Lab Results   Component Value Date    HDL 59 07/02/2022     Lab Results   Component Value Date    LDLCALC 75 07/02/2022     Lab Results   Component Value Date    HGBA1C 5 3 07/02/2022       Results for orders placed or performed in visit on 11/05/22   CBC and differential   Result Value Ref Range    WBC 10 02 4 31 - 10 16 Thousand/uL    RBC 4 22 3 81 - 5 12 Million/uL    Hemoglobin 11 7 11 5 - 15 4 g/dL    Hematocrit 37 6 34 8 - 46 1 %    MCV 89 82 - 98 fL    MCH 27 7 26 8 - 34 3 pg    MCHC 31 1 (L) 31 4 - 37 4 g/dL    RDW 15 4 (H) 11 6 - 15 1 %    MPV 9 4 8 9 - 12 7 fL    Platelets 678 (H) 330 - 390 Thousands/uL    nRBC 0 /100 WBCs    Neutrophils Relative 62 43 - 75 %    Immat GRANS % 0 0 - 2 %    Lymphocytes Relative 29 14 - 44 %    Monocytes Relative 6 4 - 12 %    Eosinophils Relative 2 0 - 6 %    Basophils Relative 1 0 - 1 %    Neutrophils Absolute 6 23 1 85 - 7 62 Thousands/µL    Immature Grans Absolute 0 03 0 00 - 0 20 Thousand/uL    Lymphocytes Absolute 2 89 0 60 - 4 47 Thousands/µL    Monocytes Absolute 0 60 0 17 - 1 22 Thousand/µL    Eosinophils Absolute 0 18 0 00 - 0 61 Thousand/µL    Basophils Absolute 0 09 0 00 - 0 10 Thousands/µL   Basic metabolic panel   Result Value Ref Range    Sodium 133 (L) 135 - 147 mmol/L    Potassium 4 2 3 5 - 5 3 mmol/L    Chloride 104 96 - 108 mmol/L    CO2 26 21 - 32 mmol/L    ANION GAP 3 (L) 4 - 13 mmol/L    BUN 13 5 - 25 mg/dL    Creatinine 0 77 0 60 - 1 30 mg/dL    Glucose, Fasting 96 65 - 99 mg/dL    Calcium 9 1 8 3 - 10 1 mg/dL    eGFR 96 ml/min/1 73sq m       No orders of the defined types were placed in this encounter  Current Medications     Current Outpatient Medications   Medication Sig Dispense Refill   • escitalopram (LEXAPRO) 20 mg tablet Take 1 tablet (20 mg total) by mouth daily 90 tablet 0   • loratadine (CLARITIN) 10 mg tablet Take 1 tablet by mouth daily     • meloxicam (MOBIC) 15 mg tablet Take 1 tablet (15 mg total) by mouth daily as needed for moderate pain 90 tablet 0   • norgestimate-ethinyl estradiol (Tri-Lo-Sprintec) 0 18/0 215/0 25 MG-25 MCG per tablet Take 1 tablet by mouth in the morning   84 tablet 4   • omeprazole (PriLOSEC) 20 mg delayed release capsule Take 20 mg by mouth daily     • PREVIDENT 5000 BOOSTER PLUS 1 1 % PSTE Use as directed  5     No current facility-administered medications for this visit  ALLERGIES:  No Known Allergies      Alice Shaver MD   [address]  1/23/2023  10:45 AM    Parts of this note were dictated using [employer] dictation software and may have sounds-like errors due to variation in pronunciation

## 2023-01-23 ENCOUNTER — OFFICE VISIT (OUTPATIENT)
Dept: FAMILY MEDICINE CLINIC | Facility: CLINIC | Age: 43
End: 2023-01-23

## 2023-01-23 VITALS
HEIGHT: 59 IN | HEART RATE: 65 BPM | SYSTOLIC BLOOD PRESSURE: 124 MMHG | DIASTOLIC BLOOD PRESSURE: 80 MMHG | WEIGHT: 191 LBS | BODY MASS INDEX: 38.51 KG/M2 | OXYGEN SATURATION: 98 %

## 2023-01-23 DIAGNOSIS — E66.9 OBESITY, CLASS I, BMI 30-34.9: ICD-10-CM

## 2023-01-23 DIAGNOSIS — F41.1 GAD (GENERALIZED ANXIETY DISORDER): Primary | ICD-10-CM

## 2023-01-23 NOTE — PATIENT INSTRUCTIONS
Nutrition  - No snacking after 6pm unless it's an event  - Add something for breakfast (whole fruit, yogurt, whole grain bread with nut butter)    Physical Activity   - We will wait on this for now, but Sergio Strong will think about what PA ideas might work for her    [facility]  - Stay tuned regarding a therapy appointment  - Check [facility] for John

## 2023-01-23 NOTE — LETTER
Dear Dr Carmelita Eisenmenger,    As part of the ongoing efforts to develop Lifestyle Medicine within the network, our [address] [place] [address] team has reached out to offer graduates of the employee [employer] class the opportunity to see me for a Lifestyle Medicine consultation, to discuss some daily lifestyle interventions that could assist in their journey to better health  It has been made clear to these patients that these visits are meant to supplement and assist their primary care provider visits, NOT to replace them  Shawn Benavidez find that they will receive education, materials, and potentially referrals to lifestyle-related services such as Nutrition and Medical Fitness  Medications management will not be done, and all aspects of their general primary care will remain in your hands  Please don't hesitate to reach out with any questions or feedback - I'm happy to discuss and collaborate      Very Respectfully,    Mary Robles MD   - [address] [facility] Residency - [place] Willoughby of the [employer] of Lifestyle Medicine

## 2023-01-24 ENCOUNTER — TELEPHONE (OUTPATIENT)
Dept: BEHAVIORAL/MENTAL HEALTH CLINIC | Facility: CLINIC | Age: 43
End: 2023-01-24

## 2023-01-24 NOTE — TELEPHONE ENCOUNTER
LUCINDA to offer pt therapy services with Eduar gould     LM asking if pt was interested in services with our virtual TA team or would prefer in-person services    Informed pt to give a call back either way to start the scheduling process

## 2023-01-25 ENCOUNTER — TELEMEDICINE (OUTPATIENT)
Dept: PSYCHIATRY | Facility: CLINIC | Age: 43
End: 2023-01-25

## 2023-01-25 DIAGNOSIS — F41.1 GAD (GENERALIZED ANXIETY DISORDER): Primary | ICD-10-CM

## 2023-01-25 NOTE — PSYCH
Visit Time    Visit Start Time: 1400  Visit Stop Time: 4625  Total Visit Duration: 46 minutes    Assessment/Plan:      Diagnoses and all orders for this visit:    KAMI (generalized anxiety disorder)          Subjective:     Patient ID: Adrianna Plummer is a 40 y o  female who reports that recently she has began getting anxious "over stupid stuff," feeling sad, and becoming tearful  Sergio Strong expressed this began for her slightly before the holidays and prior to this she was not overly anxious or tearful, but admitted to mildly worrying at times  When asked Sergio Blairjuan denied any life stressors or major life changes reporting she has a comfortable life and enjoys her job  Clinician asked Sergio Strong more about triggers and catalysts to her anxiety and Sergio Strong was essentially unsure as to what exactly is causing her to feel this way  She explained that she is on Lexapro for migraines, but that she is still getting migraines and that the medication it's self may actually be what is causing the increase in anxiety and she plans to discuss this with her doctor next visit  In the interim clinician talked with Sergio Blairhaimary about CBT skills, providing psycho-education for today's purposes to work toward combating her increase in anxiety  HPI: Sergio Strong was calm and cooperative  Demonstrated good insight and judgement, thinking was linear and goal directed and she was an active participant in her session       Pre-morbid level of function and History of Present Illness:  "I've always been a worrier, but before this I felt happier, more optimistic "     Reason for evaluation and partial hospitalization as an alternative to inpatient hospitalization: No  Previous Psychiatric/psychological treatment/year: none  Current Psychiatrist/Therapist: Mike Alicea LCSW Therapy Anywhere clinician  Outpatient and/or Partial and Other Community Resources Used (CTT, ICM, VNA): Outpatient Therapy Anywhere      Problem Assessment:     SOCIAL/VOCATION:  Family Constellation (include parents, relationship with each and pertinent Psych/Medical History):     Family History   Problem Relation Age of Onset   • Hypertension Mother    • Endometrial cancer Mother    • Osteopenia Mother    • Hypertension Father    • Breast cancer Maternal Grandmother 59   • Lung cancer Maternal Grandmother    • No Known Problems Maternal Grandfather    • Rheum arthritis Paternal Grandmother    • Cancer Paternal Grandfather         either stomach, colon or pancreatic-not sure   • No Known Problems Maternal Aunt    • No Known Problems Maternal Aunt    • No Known Problems Maternal Aunt        Mother: Toi Gunderson and lives with her mother  Spouse: n/a   Father:    Children: no children   Sibling: Older brother   Sibling: n/a   Children: n/a   Other: n/a    Who is the person you relate to best "my mom"  she lives with her mother  Legal Guardian (for individuals under 25): n/a  Family Factors impacting discharge planning (for individuals under 18): n/a    Domestic Violence: No past history of domestic violence    Additional Comments related to family/relationships/peer support: Close friends, her mother, my sister in law  School or Work History (strengths/limitations/needs): Works at [employer]    Her highest grade level achieved was: Bachelors degree Communications     history includes: n/a    Financial status includes:  n/a    LEISURE ASSESSMENT (Include past and present hobbies/interests and level of involvement (Ex: Group/Club Affiliations): Watch TV, love food (out to eat, cooking), my cats  Her primary language is [place]  Preferred language is [place]  Ethnic considerations are none voiced  Religions affiliations and level of involvement None        FUNCTIONAL STATUS: There has been a recent change in the patient's ability to do the following: does not need van service    Level of Assistance Needed/By Whom?: N/A    Ashleigh Meade learns best by  reading, listening and demonstration    SUBSTANCE ABUSE ASSESSMENT: no substance abuse    Do you currently smoke? NoOffered smoking cessation? No    Substance/Route/Age/Amount/Frequency/Last Use: N/A    DETOX HISTORY: n/a    Previous detox/rehab treatment: N/A    HEALTH ASSESSMENT: PCP not notified     Primary Care Physician: Dr Chantell Pichardo  If None on file providers offered:Yes  Date of Last Physical: 1-23-23 if not within the last year, one has been recommended:No    LEGAL: No Mental Health Advance Directive or Power of  on file    Risk Assessment:   The following ratings are based [facility] of Harm to Self:   Demographic risk factors include   Historical Risk Factors include none  Recent Specific Risk Factors include none    Risk of Harm to Others:   Demographic Risk Factors include none  Historical Risk Factors include none  Recent Specific Risk Factors include none    Access to Weapons:   Georgia López has access to the following weapons: none  The following steps have been taken to ensure weapons are properly secured: n/a    Based on the above information, the client presents the following risk of harm to self or others:  low    The following interventions are recommended:   no intervention changes    Notes regarding this Risk Assessment: Darcy denied any thoughts of SI or HI       Review Of Systems:     Mood Normal   Behavior Normal    Thought Content Normal   General Normal    Personality Normal   Other Psych Symptoms anxiety, becoming more tearful   Constitutional n/a   ENT n/a   Cardiovascular n/a   Respiratory n/a   Gastrointestinal n/a   Genitourinary n/a   Musculoskeletal n/a   Integumentary n/a   Neurological n/a   Endocrine n/a   Other Symptoms n/a       Mental status:  Appearance calm and cooperative    Mood euthymic   Affect affect appropriate    Speech a normal rate   Thought Processes normal thought processes   Hallucinations no hallucinations present    Thought Content no delusions   Abnormal Thoughts no suicidal thoughts  and no homicidal thoughts    Orientation  oriented to person, oriented to place and oriented to time   Remote Memory short term memory intact and long term memory intact   Attention Span concentration intact   Intellect Appears to be of Average Intelligence   Fund of Knowledge displays adequate knowledge of current events   Insight Insight intact   Judgement judgment was intact   Muscle Strength Normal gait    Language no difficulty repeating a phrase  and no difficulty writing a sentence    Pain none   Pain Scale 0       DSM:   1  KAMI (generalized anxiety disorder)            Plan: individual 4x a month for 45 minutes       Anticipated aftercare plan: TBD        Virtual Regular Visit    Verification of patient location:    Patient is located in the following state in which I hold an active license [place]      Assessment/Plan:    Problem List Items Addressed This Visit        Other    KAMI (generalized anxiety disorder) - Primary       Goals addressed in session: Goal 1      Clinician provided psych-oeducation on CBT    Reason for visit is   Chief Complaint   Patient presents with   • Virtual Regular Visit        Encounter provider Otis Treadwell LCSW    Provider located at [address] [place] [id number] [phone]

## 2023-02-01 NOTE — PROGRESS NOTES
Assessment/Plan:    No problem-specific Assessment & Plan notes found for this encounter  Diagnoses and all orders for this visit:    Encounter for well woman exam    Cervical smear, as part of routine gynecological examination  -     Liquid-based pap, screening    Surveillance for birth control, oral contraceptives    Dysplasia of cervix      will d/c ocp and observe cycles    Subjective:      Patient ID: Georgiann Hodgkins is a 41 y o  female  Pt presents for annual exam   She has no complaints  She would like to d/c her OCP, she has been on for approx 20 years  No history of heavy or painful periods  Bowel and bladder are regular  No breast concerns  The following portions of the patient's history were reviewed and updated as appropriate: allergies, current medications, past family history, past medical history, past social history, past surgical history and problem list     Review of Systems   Constitutional: Negative for chills, fever and unexpected weight change  Gastrointestinal: Negative for abdominal pain, blood in stool, constipation and diarrhea  Genitourinary: Negative  Objective:      /72 (BP Location: Right arm, Patient Position: Sitting, Cuff Size: Standard)   Ht 4' 11" (1 499 m)   Wt 81 kg (178 lb 8 oz)   LMP 02/22/2019 (Exact Date)   BMI 36 05 kg/m²          Physical Exam   Constitutional: She appears well-developed and well-nourished  HENT:   Head: Normocephalic and atraumatic  Neck: Normal range of motion  Pulmonary/Chest: Right breast exhibits no inverted nipple, no mass, no nipple discharge and no skin change  Left breast exhibits no inverted nipple, no mass, no nipple discharge and no skin change  Abdominal: Soft  Genitourinary: Vagina normal and uterus normal  Pelvic exam was performed with patient supine  There is no rash, tenderness or lesion on the right labia  There is no rash, tenderness or lesion on the left labia   Cervix exhibits no motion [Parents] : parents tenderness, no discharge and no friability  Right adnexum displays no mass, no tenderness and no fullness  Left adnexum displays no mass, no tenderness and no fullness  Lymphadenopathy: No inguinal adenopathy noted on the right or left side  Nursing note and vitals reviewed

## 2023-02-03 ENCOUNTER — TELEMEDICINE (OUTPATIENT)
Dept: PSYCHIATRY | Facility: CLINIC | Age: 43
End: 2023-02-03

## 2023-02-03 DIAGNOSIS — F41.1 GAD (GENERALIZED ANXIETY DISORDER): Primary | ICD-10-CM

## 2023-02-03 NOTE — PSYCH
Virtual Regular Visit    Verification of patient location:    Patient is located in the following state in which I hold an active license [place]      Assessment/Plan:    Problem List Items Addressed This Visit        Other    KAMI (generalized anxiety disorder) - Primary       Goals addressed in session: Goal 1 and Goal 2      During this session clinician provided [facility] with psycho-education on CBT as well as coping skills for [facility] to manage her anxiety  Reason for visit is   Chief Complaint   Patient presents with   • Virtual Regular Visit        Encounter provider Barry Qureshi LCSW    Provider located at [address] [place] [id number] [phone]      Recent Visits  No visits were found meeting these conditions  Showing recent visits within past 7 days and meeting all other requirements  Today's Visits  Date Type Provider Dept   02/03/23 [address] [place], [place] [address]   Showing today's visits and meeting all other requirements  Future Appointments  No visits were found meeting these conditions  Showing future appointments within next 150 days and meeting all other requirements       The patient was identified by name and date of birth  Chris Caldwell was informed that this is a telemedicine visit and that [facility]  She agrees to proceed     My office door was closed  No one else was in the room  She acknowledged consent and understanding of privacy and security of the video platform  The patient has agreed to participate and understands they can discontinue the visit at any time  Patient is aware this is a billable service  Bird Palacio is a 40 y o  female       HPI [facility] was calm and cooperative, her insight and judgement were intact, thinking was linear and goal directed    She was an active participant in her session  Past Medical History:   Diagnosis Date   • Abnormal Pap smear of cervix     H/o CINI   • STEFANIA I (cervical intraepithelial neoplasia I)    • GERD (gastroesophageal reflux disease) 10/23/2020   • Migraine    • Papanicolaou smear 03/09/2018    neg   • Radius fracture     Left       Past Surgical History:   Procedure Laterality Date   • COLPOSCOPY     • GYNECOLOGIC CRYOSURGERY  09/2008    STEFANIA I   • TOOTH EXTRACTION N/A 09/01/2018       Current Outpatient Medications   Medication Sig Dispense Refill   • escitalopram (LEXAPRO) 20 mg tablet Take 1 tablet (20 mg total) by mouth daily 90 tablet 0   • loratadine (CLARITIN) 10 mg tablet Take 1 tablet by mouth daily     • meloxicam (MOBIC) 15 mg tablet Take 1 tablet (15 mg total) by mouth daily as needed for moderate pain 90 tablet 0   • norgestimate-ethinyl estradiol (Tri-Lo-Sprintec) 0 18/0 215/0 25 MG-25 MCG per tablet Take 1 tablet by mouth in the morning  84 tablet 4   • omeprazole (PriLOSEC) 20 mg delayed release capsule Take 20 mg by mouth daily     • PREVIDENT 5000 BOOSTER PLUS 1 1 % PSTE Use as directed  5     No current facility-administered medications for this visit  No Known Allergies    Review of Systems    Video Exam    There were no vitals filed for this visit  Physical Exam     Visit Time    Visit Start Time: 7965  Visit Stop Time: 3961  Total Visit Duration: 49 minutes    Behavioral Health Psychotherapy Progress Note    Psychotherapy Provided: Individual Psychotherapy     1  KAMI (generalized anxiety disorder)            Goals addressed in session: Goal 1 and Goal 2     DATA: During this session clinician provided [facility] with psycho-education on CBT and how to utilize and implement CBT skills as well as mindfulness coping skills to manage her anxiety  She was receptive and she and clinician reviewed this method and applied it to examples of when WILLIAM HILARIO gets anxious    Her treatment plan was also developed during this session  During this session, this clinician used the following therapeutic modalities: Cognitive Behavioral Therapy, Motivational Interviewing and Supportive Psychotherapy    Substance Abuse was not addressed during this session  If the client is diagnosed with a co-occurring substance use disorder, please indicate any changes in the frequency or amount of use: N/A  Stage of change for addressing substance use diagnoses: No substance use/Not applicable    ASSESSMENT:  Chris Caldwell presents with a Euthymic/ normal mood  her affect is Normal range and intensity, which is congruent, with her mood and the content of the session  The client has made progress on their goals  In interviewing Chris Caldwell presents with a none risk of suicide, none risk of self-harm, and none risk of harm to others  For any risk assessment that surpasses a "low" rating, a safety plan must be developed  A safety plan was indicated: no  If yes, describe in detail n/a    PLAN: Between sessions, Chris Caldwell will practice implementing CBT skills as well as coping skills to manage anxiety  At the next session, the therapist will use Cognitive Behavioral Therapy, Motivational Interviewing and Supportive Psychotherapy to address successes and barriers to implementing these skills  Behavioral Health Treatment Plan and Discharge Planning: Chris Caldwell is aware of and agrees to continue to work on their treatment plan  They have identified and are working toward their discharge goals   yes    Visit start and stop times:    02/03/23  Start Time: 1000  Stop Time: 1049  Total Visit Time: 49 minutes

## 2023-02-06 NOTE — BH TREATMENT PLAN
[address] [facility]   1980     Date of Initial Psychotherapy Assessment: 2-3-23   Date of Current Treatment Plan: 2-3-23  Treatment Plan Target Date: 8-6-23  Treatment Plan Expiration Date: 8-6-23    Diagnosis:   1  KAMI (generalized anxiety disorder)            Area(s) of Need: Lubna Vera reports that she is single and lives at home with her mother, she works for [employer] and loves her job, but lately she’s been experiencing anxiety that caused her to recently cancel a vacation she had planned  Darcy describes that she’s always been somewhat of a worrier, but lately her anxiety has increased, and she finds herself crying for no reason, way over thinking, and she’s not sure why? At this time Lubna Vera voiced wanting to feel better and work on challenging her anxiety as well as developing coping skills to achieve this  Long Term Goal 1 (in the client's own words): “Feeling better  Feel more like yourself  No major life goals  Would like a relationship, but that’s hard ”    Stage of Change: Action    Target Date for completion: 8-6-23     Anticipated therapeutic modalities: CBT modalities, supportive psychotherapy, solution focused psychotherapy, and motivational interiviewing  People identified to complete this goal: Vale Pugh and clinician      Objective 1: (identify the means of measuring success in meeting the objective): Clinician will educate Lubna Vera on the use of CBT skills and she will learn and implement these strategies to challenge anxious and negative thoughts, use the STOP method, and assess what is in her control and what is not, as well as reframe her thinking as a means to manage anxiety  Lubna Vera will process successes and barriers to this         Objective 2: (identify the means of measuring success in meeting the objective): Lubna Vera will learn and implement coping skills to reduce overall anxiety and manage anxiety, such has mindfulness, progressive muscle relaxation, mindful breathing, cognitive reframing, awareness of physical sensations from emotions (emotional intelligence)  Objective 3:  German Garcia will engage in “Who am I” activity to assist her in developing a greater picture of how she currently sees herself and gain greater insight into who she is and what she wants for herself in the future  I am currently under the care of a [facility] psychiatric provider: yes    My [facility] psychiatric provider is: Janak Thao LCSW Therapy Anywhere    I am currently taking psychiatric medications: Yes, as prescribed    I feel that I will be ready for discharge from mental health care when I reach the following (measurable goal/objective): reduction in anxiety  For children and adults who have a legal guardian:   Has there been any change to custody orders and/or guardianship status? NA  If yes, attach updated documentation  I have N/A my Crisis Plan and have been offered a copy of this plan    [address]: Diagnosis and Treatment Plan explained to [facility] acknowledges an understanding of their diagnosis  Dollyprachi Olivaresmireille agrees to this treatment plan  I have been offered a copy of this Treatment Plan  no    Dolly Espinosa, 1980, actively participated in the creation of this treatment plan during a virtual session, using the [employer] platform  Dolly Espinosa  provided verbal consent on 2/6/2023 at 0930 AM  The treatment plan was transcribed into the [employer] Record at a later time

## 2023-02-13 ENCOUNTER — OFFICE VISIT (OUTPATIENT)
Dept: FAMILY MEDICINE CLINIC | Facility: CLINIC | Age: 43
End: 2023-02-13

## 2023-02-13 ENCOUNTER — APPOINTMENT (OUTPATIENT)
Dept: LAB | Facility: MEDICAL CENTER | Age: 43
End: 2023-02-13

## 2023-02-13 VITALS
BODY MASS INDEX: 38.51 KG/M2 | WEIGHT: 191 LBS | HEIGHT: 59 IN | SYSTOLIC BLOOD PRESSURE: 116 MMHG | TEMPERATURE: 97.6 F | HEART RATE: 68 BPM | OXYGEN SATURATION: 98 % | DIASTOLIC BLOOD PRESSURE: 78 MMHG

## 2023-02-13 DIAGNOSIS — D68.59 THROMBOPHILIA (HCC): ICD-10-CM

## 2023-02-13 DIAGNOSIS — G43.009 MIGRAINE WITHOUT AURA AND WITHOUT STATUS MIGRAINOSUS, NOT INTRACTABLE: Primary | ICD-10-CM

## 2023-02-13 DIAGNOSIS — E66.01 CLASS 2 SEVERE OBESITY DUE TO EXCESS CALORIES WITH SERIOUS COMORBIDITY AND BODY MASS INDEX (BMI) OF 37.0 TO 37.9 IN ADULT (HCC): ICD-10-CM

## 2023-02-13 DIAGNOSIS — G43.009 MIGRAINE WITHOUT AURA AND WITHOUT STATUS MIGRAINOSUS, NOT INTRACTABLE: ICD-10-CM

## 2023-02-13 DIAGNOSIS — R53.82 CHRONIC FATIGUE: ICD-10-CM

## 2023-02-13 LAB
ALBUMIN SERPL BCP-MCNC: 3.1 G/DL (ref 3.5–5)
ALP SERPL-CCNC: 53 U/L (ref 46–116)
ALT SERPL W P-5'-P-CCNC: 26 U/L (ref 12–78)
ANION GAP SERPL CALCULATED.3IONS-SCNC: 7 MMOL/L (ref 4–13)
AST SERPL W P-5'-P-CCNC: 15 U/L (ref 5–45)
BASOPHILS # BLD AUTO: 0.08 THOUSANDS/ÂΜL (ref 0–0.1)
BASOPHILS NFR BLD AUTO: 1 % (ref 0–1)
BILIRUB SERPL-MCNC: 0.35 MG/DL (ref 0.2–1)
BUN SERPL-MCNC: 12 MG/DL (ref 5–25)
CALCIUM ALBUM COR SERPL-MCNC: 10.1 MG/DL (ref 8.3–10.1)
CALCIUM SERPL-MCNC: 9.4 MG/DL (ref 8.3–10.1)
CHLORIDE SERPL-SCNC: 104 MMOL/L (ref 96–108)
CHOLEST SERPL-MCNC: 150 MG/DL
CO2 SERPL-SCNC: 25 MMOL/L (ref 21–32)
CREAT SERPL-MCNC: 0.7 MG/DL (ref 0.6–1.3)
EOSINOPHIL # BLD AUTO: 0.22 THOUSAND/ÂΜL (ref 0–0.61)
EOSINOPHIL NFR BLD AUTO: 3 % (ref 0–6)
ERYTHROCYTE [DISTWIDTH] IN BLOOD BY AUTOMATED COUNT: 15.7 % (ref 11.6–15.1)
FERRITIN SERPL-MCNC: 6 NG/ML (ref 8–388)
GFR SERPL CREATININE-BSD FRML MDRD: 107 ML/MIN/1.73SQ M
GLUCOSE P FAST SERPL-MCNC: 72 MG/DL (ref 65–99)
HCT VFR BLD AUTO: 39.5 % (ref 34.8–46.1)
HDLC SERPL-MCNC: 60 MG/DL
HGB BLD-MCNC: 12.3 G/DL (ref 11.5–15.4)
IMM GRANULOCYTES # BLD AUTO: 0.04 THOUSAND/UL (ref 0–0.2)
IMM GRANULOCYTES NFR BLD AUTO: 1 % (ref 0–2)
IRON SATN MFR SERPL: 17 % (ref 15–50)
IRON SERPL-MCNC: 68 UG/DL (ref 50–170)
LDLC SERPL CALC-MCNC: 69 MG/DL (ref 0–100)
LYMPHOCYTES # BLD AUTO: 2.78 THOUSANDS/ÂΜL (ref 0.6–4.47)
LYMPHOCYTES NFR BLD AUTO: 32 % (ref 14–44)
MCH RBC QN AUTO: 28.2 PG (ref 26.8–34.3)
MCHC RBC AUTO-ENTMCNC: 31.1 G/DL (ref 31.4–37.4)
MCV RBC AUTO: 91 FL (ref 82–98)
MONOCYTES # BLD AUTO: 0.74 THOUSAND/ÂΜL (ref 0.17–1.22)
MONOCYTES NFR BLD AUTO: 8 % (ref 4–12)
NEUTROPHILS # BLD AUTO: 4.95 THOUSANDS/ÂΜL (ref 1.85–7.62)
NEUTS SEG NFR BLD AUTO: 55 % (ref 43–75)
NONHDLC SERPL-MCNC: 90 MG/DL
NRBC BLD AUTO-RTO: 0 /100 WBCS
PLATELET # BLD AUTO: 469 THOUSANDS/UL (ref 149–390)
PMV BLD AUTO: 9.2 FL (ref 8.9–12.7)
POTASSIUM SERPL-SCNC: 4.1 MMOL/L (ref 3.5–5.3)
PROT SERPL-MCNC: 7.9 G/DL (ref 6.4–8.4)
RBC # BLD AUTO: 4.36 MILLION/UL (ref 3.81–5.12)
SODIUM SERPL-SCNC: 136 MMOL/L (ref 135–147)
TIBC SERPL-MCNC: 397 UG/DL (ref 250–450)
TRIGL SERPL-MCNC: 105 MG/DL
TSH SERPL DL<=0.05 MIU/L-ACNC: 1.47 UIU/ML (ref 0.45–4.5)
WBC # BLD AUTO: 8.81 THOUSAND/UL (ref 4.31–10.16)

## 2023-02-13 RX ORDER — TOPIRAMATE 25 MG/1
25 TABLET ORAL DAILY
Qty: 90 TABLET | Refills: 0 | Status: SHIPPED | OUTPATIENT
Start: 2023-02-13

## 2023-02-13 RX ORDER — ASPIRIN 81 MG/1
81 TABLET, CHEWABLE ORAL DAILY PRN
COMMUNITY

## 2023-02-13 NOTE — PROGRESS NOTES
Assessment/Plan:    1  Migraine without aura and without status migrainosus, not intractable  -     topiramate (Topamax) 25 mg tablet; Take 1 tablet (25 mg total) by mouth daily  -     CBC and differential; Future  -     Comprehensive metabolic panel; Future  -     TSH, 3rd generation; Future  -     Lipid panel; Future  -     Iron Panel (Includes Ferritin, Iron Sat%, Iron, and TIBC); Future    2  Thrombophilia (HCC)  -     CBC and differential; Future  -     Comprehensive metabolic panel; Future  -     TSH, 3rd generation; Future  -     Lipid panel; Future  -     Iron Panel (Includes Ferritin, Iron Sat%, Iron, and TIBC); Future    3  Class 2 severe obesity due to excess calories with serious comorbidity and body mass index (BMI) of 37 0 to 37 9 in adult (HCC)  -     CBC and differential; Future  -     Comprehensive metabolic panel; Future  -     TSH, 3rd generation; Future  -     Lipid panel; Future  -     Iron Panel (Includes Ferritin, Iron Sat%, Iron, and TIBC); Future    4  Chronic fatigue  -     CBC and differential; Future  -     Comprehensive metabolic panel; Future  -     TSH, 3rd generation; Future  -     Lipid panel; Future  -     Iron Panel (Includes Ferritin, Iron Sat%, Iron, and TIBC); Future        There are no Patient Instructions on file for this visit  Return in about 6 months (around 8/13/2023)  Subjective:      Patient ID: Lexi Guan is a 40 y o  female  Chief Complaint   Patient presents with   • Follow-up       Here for f/u  Wants to come off the lexapro  Was initially given lexapro for headaches but no longer helping and now feels like the med making her more anxious, never had anxiety before  Interested in trying something else for her migraine prevention  Has been seeing a therapist and a lifestyle med doctor  Feeling tired, feeling "off"  On OCP, unsure if she is perimenopausal, menses does aggravate the migraines at times  Asks about iron   Has been on PPI for several years for GERD        The following portions of the patient's history were reviewed and updated as appropriate: allergies, current medications, past family history, past medical history, past social history, past surgical history and problem list     Review of Systems   Constitutional: Negative for fatigue and fever  HENT: Negative for congestion  Eyes: Negative for visual disturbance  Respiratory: Negative for chest tightness and shortness of breath  Cardiovascular: Negative for chest pain and palpitations  Gastrointestinal: Negative for abdominal pain  Genitourinary: Negative for difficulty urinating  Musculoskeletal: Negative for arthralgias  Neurological: Negative for headaches  Hematological: Does not bruise/bleed easily  Current Outpatient Medications   Medication Sig Dispense Refill   • aspirin 81 mg chewable tablet Chew 81 mg daily as needed     • loratadine (CLARITIN) 10 mg tablet Take 1 tablet by mouth daily     • meloxicam (MOBIC) 15 mg tablet Take 1 tablet (15 mg total) by mouth daily as needed for moderate pain 90 tablet 0   • norgestimate-ethinyl estradiol (Tri-Lo-Sprintec) 0 18/0 215/0 25 MG-25 MCG per tablet Take 1 tablet by mouth in the morning  84 tablet 4   • omeprazole (PriLOSEC) 20 mg delayed release capsule Take 20 mg by mouth daily     • topiramate (Topamax) 25 mg tablet Take 1 tablet (25 mg total) by mouth daily 90 tablet 0   • PREVIDENT 5000 BOOSTER PLUS 1 1 % PSTE Use as directed  5     No current facility-administered medications for this visit  Objective:    /78 (BP Location: Right arm, Patient Position: Sitting, Cuff Size: Large)   Pulse 68   Temp 97 6 °F (36 4 °C)   Ht 4' 11" (1 499 m)   Wt 86 6 kg (191 lb)   SpO2 98%   BMI 38 58 kg/m²        Physical Exam  Vitals reviewed  Constitutional:       Appearance: Normal appearance  She is well-developed  Cardiovascular:      Rate and Rhythm: Normal rate and regular rhythm        Heart sounds: Normal heart sounds  Pulmonary:      Effort: Pulmonary effort is normal       Breath sounds: Normal breath sounds  Skin:     General: Skin is warm  Neurological:      Mental Status: She is alert and oriented to person, place, and time  Psychiatric:         Mood and Affect: Mood normal          Behavior: Behavior normal          Thought Content:  Thought content normal          Judgment: Judgment normal                 Ad Reynoso MD

## 2023-02-17 ENCOUNTER — TELEMEDICINE (OUTPATIENT)
Dept: PSYCHIATRY | Facility: CLINIC | Age: 43
End: 2023-02-17

## 2023-02-17 DIAGNOSIS — F41.1 GAD (GENERALIZED ANXIETY DISORDER): Primary | ICD-10-CM

## 2023-02-17 NOTE — PSYCH
Virtual Regular Visit    Verification of patient location:    Patient is located in the following state in which I hold an active license [place]      Assessment/Plan:    Problem List Items Addressed This Visit        Other    KAMI (generalized anxiety disorder) - Primary       Goals addressed in session: Goal 2     Darcy discussed different ways she can manage anxiety, such as becoming more organized and less cluttered  Reason for visit is   Chief Complaint   Patient presents with   • Virtual Regular Visit        Encounter provider Gabrielle Elizalde LCSW    Provider located at [address] [place], [place] [address]derek [facility] [address] [phone]      Recent Visits  Date Type Provider Dept   02/13/23 Office Visit Nora Jeffries MD [facility]   Showing recent visits within past 7 days and meeting all other requirements  Today's Visits  Date Type Provider Dept   [facility]   Showing today's visits and meeting all other requirements  Future Appointments  No visits were found meeting these conditions  Showing future appointments within next 150 days and meeting all other requirements       The patient was identified by name and date of birth  Debby Velez was informed that this is a telemedicine visit and that the visit is being conducted through Teams video as neither Epic nor [employer] would allow a connection  She agrees to proceed     My office door was closed  No one else was in the room  She acknowledged consent and understanding of privacy and security of the video platform  The patient has agreed to participate and understands they can discontinue the visit at any time  Patient is aware this is a billable service  Scott He is a 40 y o  female      HPI Aundra Neat was calm and cooperative, insight and judgement were in tact    Thinking was linear and goal directed and she was an active participant in her session  Past Medical History:   Diagnosis Date   • Abnormal Pap smear of cervix     H/o CINI   • STEFANIA I (cervical intraepithelial neoplasia I)    • GERD (gastroesophageal reflux disease) 10/23/2020   • Migraine    • Papanicolaou smear 03/09/2018    neg   • Radius fracture     Left       Past Surgical History:   Procedure Laterality Date   • COLPOSCOPY     • GYNECOLOGIC CRYOSURGERY  09/2008    STEFANIA I   • TOOTH EXTRACTION N/A 09/01/2018       Current Outpatient Medications   Medication Sig Dispense Refill   • aspirin 81 mg chewable tablet Chew 81 mg daily as needed     • loratadine (CLARITIN) 10 mg tablet Take 1 tablet by mouth daily     • meloxicam (MOBIC) 15 mg tablet Take 1 tablet (15 mg total) by mouth daily as needed for moderate pain 90 tablet 0   • norgestimate-ethinyl estradiol (Tri-Lo-Sprintec) 0 18/0 215/0 25 MG-25 MCG per tablet Take 1 tablet by mouth in the morning  84 tablet 4   • omeprazole (PriLOSEC) 20 mg delayed release capsule Take 20 mg by mouth daily     • PREVIDENT 5000 BOOSTER PLUS 1 1 % PSTE Use as directed  5   • topiramate (Topamax) 25 mg tablet Take 1 tablet (25 mg total) by mouth daily 90 tablet 0     No current facility-administered medications for this visit  No Known Allergies    Review of Systems    Video Exam    There were no vitals filed for this visit  Physical Exam     Visit Time    Visit Start Time: 3758  Visit Stop Time: 5034  Total Visit Duration: 53 minutes    Behavioral Health Psychotherapy Progress Note    Psychotherapy Provided: Individual Psychotherapy     1  KAMI (generalized anxiety disorder)            Goals addressed in session: Goal 3      DATA: During this session overall Darcy reported she had been busy and hadn't had overt anxiety or intrusive thoughts    She added that she has come up with a 'plan' to gain more control over her health and mental health and this makes her feel productive and therefore making progress  She added that as a coping skill and to reduce feeling anxious and overwhelmed in general she needs to clean out her room and get more organized as the clutter is starting to overwhelm her  Clinician agreed and continued to encourage WILLIAM HILARIO to come up with solutions to improve her mental and emotional health and continue to make her feel productive and successful in life  During this session, this clinician used the following therapeutic modalities: Motivational Interviewing, Solution-Focused Therapy and Supportive Psychotherapy    Substance Abuse was not addressed during this session  If the client is diagnosed with a co-occurring substance use disorder, please indicate any changes in the frequency or amount of use: n/a  Stage of change for addressing substance use diagnoses: No substance use/Not applicable    ASSESSMENT:  Orion De La Paz presents with a Euthymic/ normal mood  her affect is Normal range and intensity, which is congruent, with her mood and the content of the session  The client has made progress on their goals  In interviewing Orion De La Paz presents with a none risk of suicide, none risk of self-harm, and none risk of harm to others  For any risk assessment that surpasses a "low" rating, a safety plan must be developed  A safety plan was indicated: no  If yes, describe in detail n/a    PLAN: Between sessions, Orion De La Paz will work on de-cluttering and becoming more organized to reduce anxiety  At the next session, the therapist will use Motivational Interviewing, Solution-Focused Therapy and Supportive Psychotherapy to address any success and barriers to reducing anxiety  Behavioral Health Treatment Plan and Discharge Planning: Orion De La Paz is aware of and agrees to continue to work on their treatment plan  They have identified and are working toward their discharge goals   yes

## 2023-02-17 NOTE — PROGRESS NOTES
Lifestyle Medicine Office Visit  Filiberto Delgado 40 y o  female   MRN: [id number] : 1980  ENCOUNTER: 2023 10:40 AM    Assessment and Plan   Lifestyle Medicine Intervention Update - Carlyle Veronica has been doing great work  We updated her plan as follows:    Nutrition  We discussed serving size of fruits and vegetables  We set a goal of increasing daily fruit and vegetable intake to 5-6 servings a day  We also discussed limitation of sweetened beverages  I have asked Darcy to review her intake of full fat dairy products, especially cheese  Once she has had a chance to look into this, we will set a goal around this  She will continue to place emphasis on eating breakfast   She has already noticed the impact of having higher protein and more filling foods earlier in the day  Physical Activity  Carlyle Veronica explains that she does like to walk, particularly when the weather is nicer  We discussed setting a goal for initiation of midday walks, and I asked her to game plan how she can start this, mid-March as the weather improves  She states that she could have as many as 5 days/week that she can go for walks  She plans to start at 15 minutes per walk, and we reviewed the definition of moderate intensity exercise  [address] is having a very nice experience thus far with virtual therapy, and is doing okay off of Lexapro for the past week  She will continue to learn cognitive behavioral techniques reviewed by her therapist, and implement these as she is able  It was exciting today to see how much more agency Carlyle Veronica feels in her own health  We will regroup in 4 weeks to review these interventions  Reason for Visit     Chief Complaint   Patient presents with   • Follow-up       History of Present Illness   Filiberto Delgado is a 40y o -year-old female who presents today for Lifestyle Medicine follow up  At our last visit we discussed the following goals      Nutrition  Limit snacking after 6 PM, and begin to introduce some form of breakfast (banana, yogurt, whole-grain bread with nut butter)  Leo Foley has been working on the breakfast idea - has done bananas, whole grain cereal, bagel thins  Being cognizant of the evening snacking, doing overall well with limiting to 6pm        Mental Health  Keep scheduled appointment with her PCP to discuss her current medication regimen, as she feels that there was some connection between initiation of escitalopram and her anxiety  We also discussed behavioral therapy, which she is open to  I also recommended use of [employer]        Physical Activity  Return to physical activity at her next visit, as Leo Foely is not yet ready to make change  She will think through options for how to incorporate physical activity before next visit  Since our last visit, Leo Foley did see Dr Rory Huang and discontinue SSRI  Slowly weaning Lexapro  Has also stopped omeprazole due to iron absorption issues  Review of Systems   Review of Systems   Constitutional: Negative for activity change, chills, fatigue and fever  HENT: Negative for congestion, sinus pressure, sinus pain and sore throat  Respiratory: Negative for cough, shortness of breath and wheezing  Cardiovascular: Negative for chest pain, palpitations and leg swelling  Gastrointestinal: Negative for abdominal pain, diarrhea, nausea and vomiting  Genitourinary: Negative for decreased urine volume, dysuria, frequency and urgency  Musculoskeletal: Negative for arthralgias, myalgias, neck pain and neck stiffness  Skin: Negative for rash  Neurological: Negative for dizziness, light-headedness, numbness and headaches         Active Problem List     Patient Active Problem List   Diagnosis   • Obesity, Class I, BMI 30-34 9   • Migraine without aura, not intractable   • GERD (gastroesophageal reflux disease)   • Allergies   • KAMI (generalized anxiety disorder)       Past Medical History, Past Surgical History, Family History, and Social History were reviewed and updated today as appropriate  Objective   /78   Pulse 85   Ht 4' 11" (1 499 m)   Wt 85 7 kg (189 lb)   SpO2 99%   BMI 38 17 kg/m²     Physical Exam  Constitutional:       Appearance: Normal appearance  HENT:      Head: Normocephalic and atraumatic  Eyes:      Conjunctiva/sclera: Conjunctivae normal       Pupils: Pupils are equal, round, and reactive to light  Cardiovascular:      Rate and Rhythm: Normal rate and regular rhythm  Heart sounds: No murmur heard  No friction rub  Pulmonary:      Effort: Pulmonary effort is normal  No respiratory distress  Breath sounds: Normal breath sounds  Neurological:      Mental Status: She is alert and oriented to person, place, and time           Pertinent Laboratory/Diagnostic Studies:  Lab Results   Component Value Date    BUN 12 02/13/2023    CREATININE 0 70 02/13/2023    CALCIUM 9 4 02/13/2023    K 4 1 02/13/2023    CO2 25 02/13/2023     02/13/2023       No results found for: TSH    Lab Results   Component Value Date    CHOL 155 07/07/2015     Lab Results   Component Value Date    TRIG 105 02/13/2023     Lab Results   Component Value Date    HDL 60 02/13/2023     Lab Results   Component Value Date    LDLCALC 69 02/13/2023     Lab Results   Component Value Date    HGBA1C 5 3 07/02/2022       Results for orders placed or performed in visit on 02/13/23   CBC and differential   Result Value Ref Range    WBC 8 81 4 31 - 10 16 Thousand/uL    RBC 4 36 3 81 - 5 12 Million/uL    Hemoglobin 12 3 11 5 - 15 4 g/dL    Hematocrit 39 5 34 8 - 46 1 %    MCV 91 82 - 98 fL    MCH 28 2 26 8 - 34 3 pg    MCHC 31 1 (L) 31 4 - 37 4 g/dL    RDW 15 7 (H) 11 6 - 15 1 %    MPV 9 2 8 9 - 12 7 fL    Platelets 919 (H) 937 - 390 Thousands/uL    nRBC 0 /100 WBCs    Neutrophils Relative 55 43 - 75 %    Immat GRANS % 1 0 - 2 %    Lymphocytes Relative 32 14 - 44 %    Monocytes Relative 8 4 - 12 %    Eosinophils Relative 3 0 - 6 %    Basophils Relative 1 0 - 1 %    Neutrophils Absolute 4 95 1 85 - 7 62 Thousands/µL    Immature Grans Absolute 0 04 0 00 - 0 20 Thousand/uL    Lymphocytes Absolute 2 78 0 60 - 4 47 Thousands/µL    Monocytes Absolute 0 74 0 17 - 1 22 Thousand/µL    Eosinophils Absolute 0 22 0 00 - 0 61 Thousand/µL    Basophils Absolute 0 08 0 00 - 0 10 Thousands/µL   Comprehensive metabolic panel   Result Value Ref Range    Sodium 136 135 - 147 mmol/L    Potassium 4 1 3 5 - 5 3 mmol/L    Chloride 104 96 - 108 mmol/L    CO2 25 21 - 32 mmol/L    ANION GAP 7 4 - 13 mmol/L    BUN 12 5 - 25 mg/dL    Creatinine 0 70 0 60 - 1 30 mg/dL    Glucose, Fasting 72 65 - 99 mg/dL    Calcium 9 4 8 3 - 10 1 mg/dL    Corrected Calcium 10 1 8 3 - 10 1 mg/dL    AST 15 5 - 45 U/L    ALT 26 12 - 78 U/L    Alkaline Phosphatase 53 46 - 116 U/L    Total Protein 7 9 6 4 - 8 4 g/dL    Albumin 3 1 (L) 3 5 - 5 0 g/dL    Total Bilirubin 0 35 0 20 - 1 00 mg/dL    eGFR 107 ml/min/1 73sq m   TSH, 3rd generation   Result Value Ref Range    TSH 3RD GENERATON 1 470 0 450 - 4 500 uIU/mL   Lipid panel   Result Value Ref Range    Cholesterol 150 See Comment mg/dL    Triglycerides 105 See Comment mg/dL    HDL, Direct 60 >=50 mg/dL    LDL Calculated 69 0 - 100 mg/dL    Non-HDL-Chol (CHOL-HDL) 90 mg/dl   Iron Saturation %   Result Value Ref Range    Iron Saturation 17 15 - 50 %    TIBC 397 250 - 450 ug/dL    Iron 68 50 - 170 ug/dL   Ferritin   Result Value Ref Range    Ferritin 6 (L) 8 - 388 ng/mL       No orders of the defined types were placed in this encounter          Current Medications     Current Outpatient Medications   Medication Sig Dispense Refill   • aspirin 81 mg chewable tablet Chew 81 mg daily as needed     • loratadine (CLARITIN) 10 mg tablet Take 1 tablet by mouth daily     • meloxicam (MOBIC) 15 mg tablet Take 1 tablet (15 mg total) by mouth daily as needed for moderate pain 90 tablet 0   • norgestimate-ethinyl estradiol (Tri-Lo-Sprintec) 0  18/0 215/0 25 MG-25 MCG per tablet Take 1 tablet by mouth in the morning  84 tablet 4   • omeprazole (PriLOSEC) 20 mg delayed release capsule Take 20 mg by mouth daily     • topiramate (Topamax) 25 mg tablet Take 1 tablet (25 mg total) by mouth daily 90 tablet 0     No current facility-administered medications for this visit  ALLERGIES:  No Known Allergies      Velma Hargrove MD   [address]  2/20/2023  10:40 AM    Parts of this note were dictated using [employer]*Modal dictation software and may have sounds-like errors due to variation in pronunciation

## 2023-02-20 ENCOUNTER — OFFICE VISIT (OUTPATIENT)
Dept: FAMILY MEDICINE CLINIC | Facility: CLINIC | Age: 43
End: 2023-02-20

## 2023-02-20 VITALS
WEIGHT: 189 LBS | OXYGEN SATURATION: 99 % | DIASTOLIC BLOOD PRESSURE: 78 MMHG | HEIGHT: 59 IN | BODY MASS INDEX: 38.1 KG/M2 | SYSTOLIC BLOOD PRESSURE: 120 MMHG | HEART RATE: 85 BPM

## 2023-02-20 DIAGNOSIS — F41.1 GAD (GENERALIZED ANXIETY DISORDER): Primary | ICD-10-CM

## 2023-02-20 NOTE — PATIENT INSTRUCTIONS
Nutrition:  - Increase fruit/veg to 5-6 servings/day  - Limit sweetened beverages  - Limit full fat dairy and investigate your cheese intake    Physical:  - Gameplan starting midday walks    Mental Health:  - Awesome work, keep it up!

## 2023-03-03 ENCOUNTER — TELEMEDICINE (OUTPATIENT)
Dept: PSYCHIATRY | Facility: CLINIC | Age: 43
End: 2023-03-03

## 2023-03-03 DIAGNOSIS — F41.1 GAD (GENERALIZED ANXIETY DISORDER): Primary | ICD-10-CM

## 2023-03-03 NOTE — PSYCH
Virtual Regular Visit    Verification of patient location:    Patient is located in the following state in which I hold an active license [place]      Assessment/Plan:    Problem List Items Addressed This Visit        Other    KAMI (generalized anxiety disorder) - Primary       Goals addressed in session: Goal 3       Darcy discussed self-exploration upon the anniversary of her father's passing  Reason for visit is   Chief Complaint   Patient presents with   • Virtual Regular Visit        Encounter provider Paramjit Richards LCSW    Provider located at [address] [place] [id number] [phone]      Recent Visits  No visits were found meeting these conditions  Showing recent visits within past 7 days and meeting all other requirements  Future Appointments  No visits were found meeting these conditions  Showing future appointments within next 150 days and meeting all other requirements       The patient was identified by name and date of birth  Sherin Degroot was informed that this is a telemedicine visit and that the visit is being conducted throughthe [employer]  She agrees to proceed     My office door was closed  No one else was in the room  She acknowledged consent and understanding of privacy and security of the video platform  The patient has agreed to participate and understands they can discontinue the visit at any time  Patient is aware this is a billable service       Ricardo Owens is a 40 y o  female       HPI     Past Medical History:   Diagnosis Date   • Abnormal Pap smear of cervix     H/o CINI   • STEFANIA I (cervical intraepithelial neoplasia I)    • GERD (gastroesophageal reflux disease) 10/23/2020   • Migraine    • Papanicolaou smear 03/09/2018    neg   • Radius fracture     Left       Past Surgical History:   Procedure Laterality Date   • COLPOSCOPY     • GYNECOLOGIC CRYOSURGERY 09/2008    STEFANIA I   • TOOTH EXTRACTION N/A 09/01/2018       Current Outpatient Medications   Medication Sig Dispense Refill   • aspirin 81 mg chewable tablet Chew 81 mg daily as needed     • loratadine (CLARITIN) 10 mg tablet Take 1 tablet by mouth daily     • meloxicam (MOBIC) 15 mg tablet Take 1 tablet (15 mg total) by mouth daily as needed for moderate pain 90 tablet 0   • norgestimate-ethinyl estradiol (Tri-Lo-Sprintec) 0 18/0 215/0 25 MG-25 MCG per tablet Take 1 tablet by mouth in the morning  84 tablet 4   • omeprazole (PriLOSEC) 20 mg delayed release capsule Take 20 mg by mouth daily     • topiramate (Topamax) 25 mg tablet Take 1 tablet (25 mg total) by mouth daily 90 tablet 0     No current facility-administered medications for this visit  No Known Allergies    Review of Systems    Video Exam    There were no vitals filed for this visit  Physical Exam     Visit Time    Visit Start Time: 4843  Visit Stop Time: 2948  Total Visit Duration: 51 minutes    Behavioral Health Psychotherapy Progress Note    Psychotherapy Provided: Individual Psychotherapy     1  KAMI (generalized anxiety disorder)            Goals addressed in session: Goal 3      DATA: During this session German Garcia reported that her anxiety has been better, and she even got a part time job to be more prosocial   In discussion German Garcia reported today was the anniversary of her father's death and she was able to express thoughts and emotions around her father  She furthermore processed how she and her father's relationship have ebbed and flowed over the years, and despite having not been really close she reported being tearful and still missing him  She acknowledged the feelings are valid as he is still her father  Overall, German Garcia has expressed doing well and feeling more like herself lately          During this session, this clinician used the following therapeutic modalities: Motivational Interviewing and Supportive Psychotherapy    Substance Abuse was not addressed during this session  If the client is diagnosed with a co-occurring substance use disorder, please indicate any changes in the frequency or amount of use: n/a  Stage of change for addressing substance use diagnoses: No substance use/Not applicable    ASSESSMENT:  Martin Esteban presents with a Euthymic/ normal mood  her affect is Normal range and intensity, which is congruent, with her mood and the content of the session  The client has made progress on their goals  In interviewing Martin Esteban presents with a none risk of suicide, none risk of self-harm, and none risk of harm to others  For any risk assessment that surpasses a "low" rating, a safety plan must be developed  A safety plan was indicated: no  If yes, describe in detail n/a    PLAN: Between sessions, Martin Esteban will continue to monitor her anxiety and use coping skills when necessary  At the next session, the therapist will use Motivational Interviewing and Supportive Psychotherapy to address intermittent anxiety  Behavioral Health Treatment Plan and Discharge Planning: Martin Esteban is aware of and agrees to continue to work on their treatment plan  They have identified and are working toward their discharge goals   yes

## 2023-03-17 ENCOUNTER — TELEMEDICINE (OUTPATIENT)
Dept: PSYCHIATRY | Facility: CLINIC | Age: 43
End: 2023-03-17

## 2023-03-17 DIAGNOSIS — F41.1 GAD (GENERALIZED ANXIETY DISORDER): Primary | ICD-10-CM

## 2023-03-17 NOTE — PSYCH
Virtual Regular Visit    Verification of patient location:    Patient is located in the following state in which I hold an active license [place]      Assessment/Plan:    Problem List Items Addressed This Visit        Other    KAMI (generalized anxiety disorder) - Primary       Goals addressed in session: Goal 2      Darcy has done well with coping skills and building more activities to engage in  Reason for visit is   Chief Complaint   Patient presents with   • Virtual Regular Visit        Encounter provider Douglas Dia LCSW    Provider located at [address] [place] [id number] [phone]      Recent Visits  No visits were found meeting these conditions  Showing recent visits within past 7 days and meeting all other requirements  Today's Visits  Date Type Provider Dept   03/17/23 3370 [facility]   Showing today's visits and meeting all other requirements  Future Appointments  No visits were found meeting these conditions  Showing future appointments within next 150 days and meeting all other requirements       The patient was identified by name and date of birth  Alexis Gonzales was informed that this is a telemedicine visit and that the visit is being conducted throughthe [email or web address] platform  She agrees to proceed     My office door was closed  No one else was in the room  She acknowledged consent and understanding of privacy and security of the video platform  The patient has agreed to participate and understands they can discontinue the visit at any time  Patient is aware this is a billable service  Lubna Lozano is a 40 y o  female      HPI Rafaelshanell Ballard was calm and cooperative with good insight and judgement  Thinking was linear and goal oriented  She was an active participant in her session       Past Medical History:   Diagnosis Date   • Abnormal Pap smear of cervix     H/o CINI   • STEFANIA I (cervical intraepithelial neoplasia I)    • GERD (gastroesophageal reflux disease) 10/23/2020   • Migraine    • Papanicolaou smear 03/09/2018    neg   • Radius fracture     Left       Past Surgical History:   Procedure Laterality Date   • COLPOSCOPY     • GYNECOLOGIC CRYOSURGERY  09/2008    STEFANIA I   • TOOTH EXTRACTION N/A 09/01/2018       Current Outpatient Medications   Medication Sig Dispense Refill   • aspirin 81 mg chewable tablet Chew 81 mg daily as needed     • loratadine (CLARITIN) 10 mg tablet Take 1 tablet by mouth daily     • meloxicam (MOBIC) 15 mg tablet Take 1 tablet (15 mg total) by mouth daily as needed for moderate pain 90 tablet 0   • norgestimate-ethinyl estradiol (Tri-Lo-Sprintec) 0 18/0 215/0 25 MG-25 MCG per tablet Take 1 tablet by mouth in the morning  84 tablet 4   • omeprazole (PriLOSEC) 20 mg delayed release capsule Take 20 mg by mouth daily     • topiramate (Topamax) 25 mg tablet Take 1 tablet (25 mg total) by mouth daily 90 tablet 0     No current facility-administered medications for this visit  No Known Allergies    Review of Systems    Video Exam    There were no vitals filed for this visit  Physical Exam     Visit Time    Visit Start Time: 1199  Visit Stop Time: 3182  Total Visit Duration: 45 minutes    Behavioral Health Psychotherapy Progress Note    Psychotherapy Provided: Individual Psychotherapy     1  KAMI (generalized anxiety disorder)            Goals addressed in session: Goal 2     DATA: Darcy reported doing well, that she really enjoys her second job  She's been making friends, 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 and that overall she's feeling less anxious and feeling more content  Alvaro Frank did voice having some arguments with her mother and asked clinician for suggestions regarding how she can improve communication with her mother    Clinician suggested that when Alvaro Frank gets upset with her mother she tell her mother how she is feeling and why she is upset with whatever the argument is at hand  Seun Herrmann was in agreement to try and communicate her feelings more candidly to her mother when they have a disagreement  During this session, this clinician used the following therapeutic modalities: Motivational Interviewing, Solution-Focused Therapy and Supportive Psychotherapy    Substance Abuse was not addressed during this session  If the client is diagnosed with a co-occurring substance use disorder, please indicate any changes in the frequency or amount of use: N/A  Stage of change for addressing substance use diagnoses: No substance use/Not applicable    ASSESSMENT:  Hollie Cruz presents with a Euthymic/ normal mood  her affect is Normal range and intensity, which is congruent, with her mood and the content of the session  The client has made progress on their goals  In interviewing Hollie Cruz presents with a none risk of suicide, none risk of self-harm, and none risk of harm to others  For any risk assessment that surpasses a "low" rating, a safety plan must be developed  A safety plan was indicated: yes  If yes, describe in detail n/a    PLAN: Between sessions, Hollie Cruz will practice communicating her feelings and thoughts more so with her mother when they have disagreements  At the next session, the therapist will use Motivational Interviewing, Solution-Focused Therapy and Supportive Psychotherapy to address stressors and adjustments with Darcy's present and future life goals  Behavioral Health Treatment Plan and Discharge Planning: Hollie Cruz is aware of and agrees to continue to work on their treatment plan  They have identified and are working toward their discharge goals   yes

## 2023-03-17 NOTE — BH CRISIS PLAN
Client Name: Hollie Cruz       Client YOB: 1980  : 1980    Treatment Team (include name and contact information):     Psychotherapist: Patricia Edwards LCSW    Psychiatrist: n/a   Release of information completed: no    " n/a   Release of information completed: no    Other (Specify Role): Lifestyle Medicine- Jerline Sham    Release of information completed: no    Other (Specify Role): n/a   Release of information completed: no    Healthcare Provider  Rowan Martin MD  [address] [address]  [phone]    Type of Plan   * Child plans (children 15 yo and younger) must be completed and signed by the child's legal guardian   * Plans for all individuals 15 yo and above must be signed by the client  Plan Type: adolescent/adult (15 and over) Initial      My Personal Strengths are (in the client's own words):  "Outgoing, ambitious, and motivation "     The stressors and triggers that may put me at risk are:  other (describe) incliment weather    Coping skills I can use to keep myself calm and safe: Other (describe) Being more mindful    Coping skills/supports I can use to maintain abstinence from substance use:   n/a    The people that provide me with help and support: (Include name, contact, and how they can help)   Support person #1: Terry Marsh    * Phone number: [phone]    * How can they help me? Be supportive        Support person #2:n/a    * Phone number: n/a    * How can they help me? n/a     Support person #3: n/a    * Phone number: n/a    * How can they help me? n/a    In the past, the following has helped me in times of crisis:    Calling a family member      If it is an emergency and you need immediate help, call     If there is a possibility of danger to yourself or others, call the following crisis hotline resources:     Adult Crisis Numbers  Suicide Prevention Hotline - Dial   [facility]: [address] [facility] [place]: BASILIO Nuno [address]: [address]: [phone]  [facility] [address] ([facility]): [phone]  [facility]: [address] [place] [address] Avenue: [address] [place] [address] St: [phone] (daytime)  [phone] (after hours, weekends, holidays)     Child/Adolescent Crisis Numbers   [facility]S Lists of hospitals in the [place]: Jony Mccoy 10: [phone]   Grace Sabas: [phone]   [facility] [address] ([facility]): [phone]    Please note: Some counties do not have a separate number for Child/Adolescent specific crisis  If your county is not listed under Child/Adolescent, please call the adult number for your county     National Talk to Text Line   [employer] - [phone]    In the event your feelings become unmanageable, and you cannot reach your support system, you will call 911 immediately or go to the nearest hospital emergency room

## 2023-04-03 ENCOUNTER — OFFICE VISIT (OUTPATIENT)
Dept: FAMILY MEDICINE CLINIC | Facility: CLINIC | Age: 43
End: 2023-04-03

## 2023-04-03 VITALS
WEIGHT: 187.8 LBS | HEART RATE: 62 BPM | HEIGHT: 59 IN | TEMPERATURE: 98.9 F | BODY MASS INDEX: 37.86 KG/M2 | DIASTOLIC BLOOD PRESSURE: 76 MMHG | SYSTOLIC BLOOD PRESSURE: 122 MMHG | OXYGEN SATURATION: 99 %

## 2023-04-03 DIAGNOSIS — F41.1 GAD (GENERALIZED ANXIETY DISORDER): Primary | ICD-10-CM

## 2023-04-03 NOTE — PROGRESS NOTES
"Lifestyle Medicine Office Visit  Ike Brown 40 y o  female   MRN: [id number] : 1980  ENCOUNTER: 4/3/2023 10:30 AM    Assessment and Plan   Lifestyle Medicine Interventions - Debbie Cox is doing great! We discussed the following updated plan:    Nutrition - Darcy noted that she's having dairy quite a bit  I've asked her to focus on how much cheese she's eating, and to let me know her goal for reduction in frequency of cheese intake by the end of this week  She'll also continue to increase her fruit and vegetable intake  We discussed the importance of avoiding \"drinking calories\" in the form of sweetened beverages  Physical Activity - Debbie Cox is enjoying how much she's moving as part of her part-time job  She will seek to add 15 min brisk walks at least 3 weekdays as well  [address] has done awesome work with there therapy and will continue to follow closely with her counselor  We will regroup in 6 weeks    Reason for Visit     Chief Complaint   Patient presents with   • recheck lifestyle interventions        History of Present Illness   Ike Brown is a 40y o -year-old female who presents today for Lifestyle Medicine follow-up  At our last visit, we created the following plan:    Nutrition  We discussed serving size of fruits and vegetables  We set a goal of increasing daily fruit and vegetable intake to 5-6 servings a day  We also discussed limitation of sweetened beverages  I asked Debbie Cox to review her intake of full fat dairy products, especially cheese  Once she has had a chance to look into this, we will set a goal around this  She would continue to place emphasis on eating breakfast  Debbie Cox has been keeping berries and other fruit around, and bringing salads for lunch        Physical Activity  We discussed setting a goal for initiation of midday walks, and I asked her to game plan how she can start this, mid-March as the weather improves    She states that she could have as many " "as 5 days/week that she can go for walks  She plans to start at 15 minutes per walk, and we reviewed the definition of moderate intensity exercise      [address] was having a very nice experience thus far with virtual therapy, and is doing okay off of Lexapro for the past week  She planned to continue to learn cognitive behavioral techniques reviewed by her therapist, and implement these as she is able  Spacing out therapy to monthly due to progress   Hao Valdivia is working a second job at a [employer]  This is affecting her eating habits  Review of Systems   Review of Systems   Constitutional: Negative for activity change, chills, fatigue and fever  HENT: Negative for congestion, sinus pressure, sinus pain and sore throat  Respiratory: Negative for cough, shortness of breath and wheezing  Cardiovascular: Negative for chest pain, palpitations and leg swelling  Gastrointestinal: Negative for abdominal pain, diarrhea, nausea and vomiting  Genitourinary: Negative for decreased urine volume, dysuria, frequency and urgency  Musculoskeletal: Negative for arthralgias, myalgias, neck pain and neck stiffness  Skin: Negative for rash  Neurological: Negative for dizziness, light-headedness, numbness and headaches  Active Problem List     Patient Active Problem List   Diagnosis   • Obesity, Class I, BMI 30-34 9   • Migraine without aura, not intractable   • GERD (gastroesophageal reflux disease)   • Allergies   • KAMI (generalized anxiety disorder)       Past Medical History, Past Surgical History, Family History, and Social History were reviewed and updated today as appropriate  Objective   /76 (BP Location: Right arm, Patient Position: Sitting, Cuff Size: Large)   Pulse 62   Temp 98 9 °F (37 2 °C) (Tympanic)   Ht 4' 11\" (1 499 m)   Wt 85 2 kg (187 lb 12 8 oz)   SpO2 99%   BMI 37 93 kg/m²     Physical Exam  Constitutional:       Appearance: Normal appearance     HENT:      " Head: Normocephalic and atraumatic  Eyes:      Conjunctiva/sclera: Conjunctivae normal       Pupils: Pupils are equal, round, and reactive to light  Cardiovascular:      Rate and Rhythm: Normal rate and regular rhythm  Heart sounds: No murmur heard  No friction rub  Pulmonary:      Effort: Pulmonary effort is normal  No respiratory distress  Breath sounds: Normal breath sounds  Neurological:      Mental Status: She is alert and oriented to person, place, and time           Pertinent Laboratory/Diagnostic Studies:  Lab Results   Component Value Date    BUN 12 02/13/2023    CREATININE 0 70 02/13/2023    CALCIUM 9 4 02/13/2023    K 4 1 02/13/2023    CO2 25 02/13/2023     02/13/2023       No results found for: TSH    Lab Results   Component Value Date    CHOL 155 07/07/2015     Lab Results   Component Value Date    TRIG 105 02/13/2023     Lab Results   Component Value Date    HDL 60 02/13/2023     Lab Results   Component Value Date    LDLCALC 69 02/13/2023     Lab Results   Component Value Date    HGBA1C 5 3 07/02/2022       Results for orders placed or performed in visit on 02/13/23   CBC and differential   Result Value Ref Range    WBC 8 81 4 31 - 10 16 Thousand/uL    RBC 4 36 3 81 - 5 12 Million/uL    Hemoglobin 12 3 11 5 - 15 4 g/dL    Hematocrit 39 5 34 8 - 46 1 %    MCV 91 82 - 98 fL    MCH 28 2 26 8 - 34 3 pg    MCHC 31 1 (L) 31 4 - 37 4 g/dL    RDW 15 7 (H) 11 6 - 15 1 %    MPV 9 2 8 9 - 12 7 fL    Platelets 260 (H) 786 - 390 Thousands/uL    nRBC 0 /100 WBCs    Neutrophils Relative 55 43 - 75 %    Immat GRANS % 1 0 - 2 %    Lymphocytes Relative 32 14 - 44 %    Monocytes Relative 8 4 - 12 %    Eosinophils Relative 3 0 - 6 %    Basophils Relative 1 0 - 1 %    Neutrophils Absolute 4 95 1 85 - 7 62 Thousands/µL    Immature Grans Absolute 0 04 0 00 - 0 20 Thousand/uL    Lymphocytes Absolute 2 78 0 60 - 4 47 Thousands/µL    Monocytes Absolute 0 74 0 17 - 1 22 Thousand/µL    Eosinophils Absolute 0 22 0 00 - 0 61 Thousand/µL    Basophils Absolute 0 08 0 00 - 0 10 Thousands/µL   Comprehensive metabolic panel   Result Value Ref Range    Sodium 136 135 - 147 mmol/L    Potassium 4 1 3 5 - 5 3 mmol/L    Chloride 104 96 - 108 mmol/L    CO2 25 21 - 32 mmol/L    ANION GAP 7 4 - 13 mmol/L    BUN 12 5 - 25 mg/dL    Creatinine 0 70 0 60 - 1 30 mg/dL    Glucose, Fasting 72 65 - 99 mg/dL    Calcium 9 4 8 3 - 10 1 mg/dL    Corrected Calcium 10 1 8 3 - 10 1 mg/dL    AST 15 5 - 45 U/L    ALT 26 12 - 78 U/L    Alkaline Phosphatase 53 46 - 116 U/L    Total Protein 7 9 6 4 - 8 4 g/dL    Albumin 3 1 (L) 3 5 - 5 0 g/dL    Total Bilirubin 0 35 0 20 - 1 00 mg/dL    eGFR 107 ml/min/1 73sq m   TSH, 3rd generation   Result Value Ref Range    TSH 3RD GENERATON 1 470 0 450 - 4 500 uIU/mL   Lipid panel   Result Value Ref Range    Cholesterol 150 See Comment mg/dL    Triglycerides 105 See Comment mg/dL    HDL, Direct 60 >=50 mg/dL    LDL Calculated 69 0 - 100 mg/dL    Non-HDL-Chol (CHOL-HDL) 90 mg/dl   Iron Saturation %   Result Value Ref Range    Iron Saturation 17 15 - 50 %    TIBC 397 250 - 450 ug/dL    Iron 68 50 - 170 ug/dL   Ferritin   Result Value Ref Range    Ferritin 6 (L) 8 - 388 ng/mL       No orders of the defined types were placed in this encounter  Current Medications     Current Outpatient Medications   Medication Sig Dispense Refill   • aspirin 81 mg chewable tablet Chew 81 mg daily as needed     • loratadine (CLARITIN) 10 mg tablet Take 1 tablet by mouth daily     • meloxicam (MOBIC) 15 mg tablet Take 1 tablet (15 mg total) by mouth daily as needed for moderate pain 90 tablet 0   • norgestimate-ethinyl estradiol (Tri-Lo-Sprintec) 0 18/0 215/0 25 MG-25 MCG per tablet Take 1 tablet by mouth in the morning   84 tablet 4   • omeprazole (PriLOSEC) 20 mg delayed release capsule Take 20 mg by mouth daily     • topiramate (Topamax) 25 mg tablet Take 1 tablet (25 mg total) by mouth daily 90 tablet 0     No current facility-administered medications for this visit  ALLERGIES:  No Known Allergies      Susan Lewis MD   [address]  4/3/2023  10:30 AM    Parts of this note were dictated using M*Modal dictation software and may have sounds-like errors due to variation in pronunciation

## 2023-04-03 NOTE — PATIENT INSTRUCTIONS
Nutrition - track dairy/cheese intake and let me know this week what your goal will be for cheese reduction  Physical Activity - great work moving more with the new job! Start 15 min midday walks 3d/week  Mental Health - continue therapy visits

## 2023-04-27 ENCOUNTER — HOSPITAL ENCOUNTER (OUTPATIENT)
Dept: MAMMOGRAPHY | Facility: CLINIC | Age: 43
Discharge: HOME | End: 2023-04-27

## 2023-04-27 ENCOUNTER — HOSPITAL ENCOUNTER (OUTPATIENT)
Dept: ULTRASOUND IMAGING | Facility: CLINIC | Age: 43
Discharge: HOME | End: 2023-04-27

## 2023-04-27 VITALS — BODY MASS INDEX: 37.7 KG/M2 | WEIGHT: 187 LBS | HEIGHT: 59 IN

## 2023-04-27 DIAGNOSIS — R92.8 ABNORMAL MAMMOGRAM: ICD-10-CM

## 2023-05-12 ENCOUNTER — TELEMEDICINE (OUTPATIENT)
Dept: PSYCHIATRY | Facility: CLINIC | Age: 43
End: 2023-05-12

## 2023-05-12 DIAGNOSIS — F41.1 GAD (GENERALIZED ANXIETY DISORDER): Primary | ICD-10-CM

## 2023-05-12 NOTE — PSYCH
Virtual Regular Visit    Verification of patient location:    Patient is located at Home in the following state in which I hold an active license [place]      Assessment/Plan:    Problem List Items Addressed This Visit        Other    KAMI (generalized anxiety disorder) - Primary       Goals addressed in session: Goal 3       Darcy talked about her strengths and how she has improved these past months  Reason for visit is No chief complaint on file  Encounter provider Meet Colbert LCSW    Provider located at [address] [address] [phone]      Recent Visits  No visits were found meeting these conditions  Showing recent visits within past 7 days and meeting all other requirements  Today's Visits  Date Type Provider Dept   05/12/23 [facility]   Showing today's visits and meeting all other requirements  Future Appointments  No visits were found meeting these conditions  Showing future appointments within next 150 days and meeting all other requirements       The patient was identified by name and date of birth  Rosa Rutherford was informed that this is a telemedicine visit and that the visit is being conducted throughthe [employer] platform  She agrees to proceed     My office door was closed  No one else was in the room  She acknowledged consent and understanding of privacy and security of the video platform  The patient has agreed to participate and understands they can discontinue the visit at any time  Patient is aware this is a billable service       Terry Grove is a 40 y o  female      HPI     Past Medical History:   Diagnosis Date   • Abnormal Pap smear of cervix     H/o CINI   • STEFANIA I (cervical intraepithelial neoplasia I)    • GERD (gastroesophageal reflux disease) 10/23/2020   • Migraine    • Papanicolaou smear 03/09/2018 neg   • Radius fracture     Left       Past Surgical History:   Procedure Laterality Date   • COLPOSCOPY     • GYNECOLOGIC CRYOSURGERY  09/2008    STEFANIA I   • TOOTH EXTRACTION N/A 09/01/2018       Current Outpatient Medications   Medication Sig Dispense Refill   • aspirin 81 mg chewable tablet Chew 81 mg daily as needed     • loratadine (CLARITIN) 10 mg tablet Take 1 tablet by mouth daily     • meloxicam (MOBIC) 15 mg tablet Take 1 tablet (15 mg total) by mouth daily as needed for moderate pain 90 tablet 0   • norgestimate-ethinyl estradiol (Tri-Lo-Sprintec) 0 18/0 215/0 25 MG-25 MCG per tablet Take 1 tablet by mouth in the morning  84 tablet 4   • omeprazole (PriLOSEC) 20 mg delayed release capsule Take 20 mg by mouth daily     • topiramate (Topamax) 25 mg tablet Take 1 tablet (25 mg total) by mouth daily 90 tablet 0     No current facility-administered medications for this visit  No Known Allergies    Review of Systems    Video Exam    There were no vitals filed for this visit  Physical Exam     Behavioral Health Psychotherapy Progress Note    Psychotherapy Provided: Individual Psychotherapy     1  KAMI (generalized anxiety disorder)            Goals addressed in session: Goal 3      DATA: During this session Darcy reported that she's been feeling good, that she does not feel anxious anymore and is at baseline feeling more like herself  She talked about going out and doing things by herself if there isn't anyone to come along and that she's very happy to do  Clinician supported [facility] and complimented her confidence and determination  [facility] agreed that she was proud of herself too  She continued to work part time at a [employer] in which she enjoys  She and clinician also discussed she and her mother's conflicts and communication issues  [facility] said she really hasn't had too many conflicts with her mother lately, but that she's been staying busy and not having the opportunity to fight as much  "Clinician encouraged Darcy to try to communicate with her mother when she feels it's the right time and encouraged 'I' statements and to convey her thoughts and feelings in order to have a more productive conversation  Darcy was agreeable  During this session, this clinician used the following therapeutic modalities: Motivational Interviewing, Solution-Focused Therapy and Supportive Psychotherapy    Substance Abuse was not addressed during this session  If the client is diagnosed with a co-occurring substance use disorder, please indicate any changes in the frequency or amount of use: n/a  Stage of change for addressing substance use diagnoses: No substance use/Not applicable    ASSESSMENT:  Annette Kearney presents with a Euthymic/ normal mood  her affect is Normal range and intensity, which is congruent, with her mood and the content of the session  The client has made progress on their goals  In interviewing Annette Kearney presents with a none risk of suicide, none risk of self-harm, and none risk of harm to others  For any risk assessment that surpasses a \"low\" rating, a safety plan must be developed  A safety plan was indicated: no  If yes, describe in detail n/a    PLAN: Between sessions, Annette Kearney will continue to employ self confidence, and give consideration to improved communication with her mother  At the next session, the therapist will use Motivational Interviewing, Solution-Focused Therapy and Supportive Psychotherapy to address any lingering anxiety  Behavioral Health Treatment Plan and Discharge Planning: Annette Kearney is aware of and agrees to continue to work on their treatment plan  They have identified and are working toward their discharge goals   yes    Visit start and stop times:    05/12/23  Start Time: 1309  Stop Time: 1348  Total Visit Time: 39 minutes    "

## 2023-05-24 ENCOUNTER — ANNUAL EXAM (OUTPATIENT)
Dept: GYNECOLOGY | Facility: CLINIC | Age: 43
End: 2023-05-24

## 2023-05-24 VITALS
WEIGHT: 185 LBS | BODY MASS INDEX: 37.29 KG/M2 | HEIGHT: 59 IN | DIASTOLIC BLOOD PRESSURE: 80 MMHG | SYSTOLIC BLOOD PRESSURE: 142 MMHG

## 2023-05-24 DIAGNOSIS — Z01.419 ENCOUNTER FOR ANNUAL ROUTINE GYNECOLOGICAL EXAMINATION: Primary | ICD-10-CM

## 2023-05-24 DIAGNOSIS — Z30.41 SURVEILLANCE FOR BIRTH CONTROL, ORAL CONTRACEPTIVES: ICD-10-CM

## 2023-05-24 RX ORDER — NORGESTIMATE AND ETHINYL ESTRADIOL 7DAYSX3 LO
1 KIT ORAL DAILY
Qty: 84 TABLET | Refills: 4 | Status: SHIPPED | OUTPATIENT
Start: 2023-05-24

## 2023-05-24 NOTE — PROGRESS NOTES
"Assessment/Plan:    Normal breast and GYN exam  Normal Pap smear May 2022  Normal mammogram April 2023  Doing well on OCs and would like to continue  History of STEFANIA-1      Plan: Renew OCs  Schedule mammogram for next year  Recommend healthy diet and exercise  Subjective: G0     Patient ID: Kameron Goel is a 40 y o  female presents to the office for annual exam with no complaints  Doing well on OCs and would like to continue  Denies any pelvic pain or abnormal bleeding  Denies any dyspareunia  Nuys any breast bowel or bladder problems  Working full-time in a hybrid [employer]  Change in family history  Maternal grandmother (breast cancer)  Mother (endometrial cancer)  Review of Systems   Constitutional: Negative  Negative for fatigue, fever and unexpected weight change  HENT: Negative  Eyes: Negative  Respiratory: Negative  Negative for chest tightness, shortness of breath, wheezing and stridor  Cardiovascular: Negative  Negative for chest pain, palpitations and leg swelling  Gastrointestinal: Negative  Negative for abdominal pain, blood in stool, diarrhea, nausea, rectal pain and vomiting  Endocrine: Negative  Genitourinary: Negative for dysuria, frequency, vaginal bleeding, vaginal discharge and vaginal pain  Musculoskeletal: Negative  Skin: Negative  Allergic/Immunologic: Negative  Neurological: Negative  Hematological: Negative  Psychiatric/Behavioral: Negative  All other systems reviewed and are negative  Objective:      Ht 4' 11\" (1 499 m)   Wt 83 9 kg (185 lb)   LMP 05/19/2023 (Exact Date)   BMI 37 37 kg/m²          Physical Exam  Constitutional:       Appearance: She is well-developed  HENT:      Head: Normocephalic and atraumatic  Neck:      Thyroid: No thyromegaly  Trachea: No tracheal deviation  Cardiovascular:      Rate and Rhythm: Normal rate and regular rhythm  Heart sounds: Normal heart sounds     Pulmonary:      Effort: " Pulmonary effort is normal  No respiratory distress  Breath sounds: Normal breath sounds  No stridor  No wheezing or rales  Chest:      Chest wall: No tenderness  Breasts:     Breasts are symmetrical       Right: No inverted nipple, mass, nipple discharge, skin change or tenderness  Left: No inverted nipple, mass, nipple discharge, skin change or tenderness  Abdominal:      General: Bowel sounds are normal  There is no distension  Palpations: Abdomen is soft  There is no mass  Tenderness: There is no abdominal tenderness  There is no guarding or rebound  Hernia: No hernia is present  There is no hernia in the left inguinal area  Genitourinary:     Labia:         Right: No rash, tenderness, lesion or injury  Left: No rash, tenderness, lesion or injury  Vagina: Normal  No signs of injury and foreign body  No vaginal discharge, erythema, tenderness or bleeding  Cervix: No cervical motion tenderness, discharge or friability  Uterus: Not deviated, not enlarged, not fixed and not tender  Adnexa:         Right: No mass, tenderness or fullness  Left: No mass, tenderness or fullness  Rectum: No mass, anal fissure, external hemorrhoid or internal hemorrhoid  Musculoskeletal:      Cervical back: Normal range of motion and neck supple  Lymphadenopathy:      Lower Body: No right inguinal adenopathy  No left inguinal adenopathy  Skin:     General: Skin is warm and dry  Neurological:      Mental Status: She is alert and oriented to person, place, and time  Psychiatric:         Behavior: Behavior normal          Thought Content:  Thought content normal          Judgment: Judgment normal

## 2023-06-05 ENCOUNTER — OFFICE VISIT (OUTPATIENT)
Dept: FAMILY MEDICINE CLINIC | Facility: CLINIC | Age: 43
End: 2023-06-05
Payer: COMMERCIAL

## 2023-06-05 VITALS
HEART RATE: 51 BPM | RESPIRATION RATE: 18 BRPM | DIASTOLIC BLOOD PRESSURE: 60 MMHG | TEMPERATURE: 97.6 F | BODY MASS INDEX: 37.21 KG/M2 | OXYGEN SATURATION: 100 % | SYSTOLIC BLOOD PRESSURE: 120 MMHG | WEIGHT: 184.6 LBS | HEIGHT: 59 IN

## 2023-06-05 DIAGNOSIS — F41.1 GAD (GENERALIZED ANXIETY DISORDER): Primary | ICD-10-CM

## 2023-06-05 DIAGNOSIS — K21.9 GASTROESOPHAGEAL REFLUX DISEASE WITHOUT ESOPHAGITIS: ICD-10-CM

## 2023-06-05 PROCEDURE — 99213 OFFICE O/P EST LOW 20 MIN: CPT | Performed by: FAMILY MEDICINE

## 2023-06-05 NOTE — PATIENT INSTRUCTIONS
Nutrition: limit adding cheese to home cooking to once weekly  Physical Activity: 15 min brisk walks three times weekly

## 2023-06-05 NOTE — PROGRESS NOTES
"Lifestyle Medicine Office Visit  Feliz Gosselin 40 y o  female   MRN: [id number] : 1980  ENCOUNTER: 2023 8:36 AM    Assessment and Plan   Lifestyle Medicine Interventions - Luis Finn has been doing excellent work, and feels better, healthier, more energized and less anxious  We discussed the following plan updates:    Nutrition - Luis Finn will start the CSA this week, and will be using the produce to create the majority of her meals  We discussed reducing addition of dairy (cheese) to her home-cooked meals/snacks to no more than once weekly  This will help with her GERD as well  She'll continue to drink lots of water and avoid sugar-sweetened beverages  Physical Activity - Luis Finn is committed to moving over the lunch hour, and will plan to add 3 x 15 min brisk walks each week  [facility] is doing great with therapy and will continue to attend now-monthly sessions  We agreed to regroup in 6 weeks    Reason for Visit     Chief Complaint   Patient presents with   • Follow-up     No concern        History of Present Illness   Feliz Gosselin is a 40y o -year-old female who presents today for Lifestyle Medicine follow-up  Our plan from last visit:    Nutrition - plan to be made for reduction in dairy  Continue to increase fruit and vegetable intake  Avoid \"drinking calories\" in the form of sweetened beverages  Eats cheese daily, at least once  Drinking more water, having fewer sweetened beverages  Starts CSA tomorrow        Physical Activity - Add 15 min brisk walks at least 3 weekdays as well  Getting up at least to move every day at lunchtime  Had a recent trip to [place] and walked a lot      Mental Health - to continue therapy  Review of Systems   Review of Systems   Constitutional: Negative for activity change, chills, fatigue and fever  HENT: Negative for congestion, sinus pressure, sinus pain and sore throat      Respiratory: Negative for cough, shortness of breath and " "wheezing  Cardiovascular: Negative for chest pain, palpitations and leg swelling  Gastrointestinal: Negative for abdominal pain, diarrhea, nausea and vomiting  Genitourinary: Negative for decreased urine volume, dysuria, frequency and urgency  Musculoskeletal: Negative for arthralgias, myalgias, neck pain and neck stiffness  Skin: Negative for rash  Neurological: Negative for dizziness, light-headedness, numbness and headaches  Active Problem List     Patient Active Problem List   Diagnosis   • Obesity, Class I, BMI 30-34 9   • Migraine without aura, not intractable   • GERD (gastroesophageal reflux disease)   • Allergies   • KAMI (generalized anxiety disorder)       Past Medical History, Past Surgical History, Family History, and Social History were reviewed and updated today as appropriate  Objective   /60 (BP Location: Right arm, Patient Position: Sitting, Cuff Size: Large)   Pulse (!) 51   Temp 97 6 °F (36 4 °C) (Tympanic)   Resp 18   Ht 4' 11\" (1 499 m)   Wt 83 7 kg (184 lb 9 6 oz)   LMP 05/19/2023 (Exact Date)   SpO2 100%   BMI 37 28 kg/m²     Physical Exam  Constitutional:       Appearance: Normal appearance  HENT:      Head: Normocephalic and atraumatic  Eyes:      Conjunctiva/sclera: Conjunctivae normal       Pupils: Pupils are equal, round, and reactive to light  Cardiovascular:      Rate and Rhythm: Normal rate and regular rhythm  Heart sounds: No murmur heard  No friction rub  Pulmonary:      Effort: Pulmonary effort is normal  No respiratory distress  Breath sounds: Normal breath sounds  Neurological:      Mental Status: She is alert and oriented to person, place, and time           Pertinent Laboratory/Diagnostic Studies:  Lab Results   Component Value Date    BUN 12 02/13/2023    CALCIUM 9 4 02/13/2023     02/13/2023    CO2 25 02/13/2023    CREATININE 0 70 02/13/2023    K 4 1 02/13/2023       No results found for: \"TSH\"    Lab Results " Component Value Date    CHOL 155 07/07/2015     Lab Results   Component Value Date    TRIG 105 02/13/2023     Lab Results   Component Value Date    HDL 60 02/13/2023     Lab Results   Component Value Date    LDLCALC 69 02/13/2023     Lab Results   Component Value Date    HGBA1C 5 3 07/02/2022       Results for orders placed or performed in visit on 02/13/23   CBC and differential   Result Value Ref Range    WBC 8 81 4 31 - 10 16 Thousand/uL    RBC 4 36 3 81 - 5 12 Million/uL    Hemoglobin 12 3 11 5 - 15 4 g/dL    Hematocrit 39 5 34 8 - 46 1 %    MCV 91 82 - 98 fL    MCH 28 2 26 8 - 34 3 pg    MCHC 31 1 (L) 31 4 - 37 4 g/dL    RDW 15 7 (H) 11 6 - 15 1 %    MPV 9 2 8 9 - 12 7 fL    Platelets 540 (H) 023 - 390 Thousands/uL    nRBC 0 /100 WBCs    Neutrophils Relative 55 43 - 75 %    Immat GRANS % 1 0 - 2 %    Lymphocytes Relative 32 14 - 44 %    Monocytes Relative 8 4 - 12 %    Eosinophils Relative 3 0 - 6 %    Basophils Relative 1 0 - 1 %    Neutrophils Absolute 4 95 1 85 - 7 62 Thousands/µL    Immature Grans Absolute 0 04 0 00 - 0 20 Thousand/uL    Lymphocytes Absolute 2 78 0 60 - 4 47 Thousands/µL    Monocytes Absolute 0 74 0 17 - 1 22 Thousand/µL    Eosinophils Absolute 0 22 0 00 - 0 61 Thousand/µL    Basophils Absolute 0 08 0 00 - 0 10 Thousands/µL   Comprehensive metabolic panel   Result Value Ref Range    Sodium 136 135 - 147 mmol/L    Potassium 4 1 3 5 - 5 3 mmol/L    Chloride 104 96 - 108 mmol/L    CO2 25 21 - 32 mmol/L    ANION GAP 7 4 - 13 mmol/L    BUN 12 5 - 25 mg/dL    Creatinine 0 70 0 60 - 1 30 mg/dL    Glucose, Fasting 72 65 - 99 mg/dL    Calcium 9 4 8 3 - 10 1 mg/dL    Corrected Calcium 10 1 8 3 - 10 1 mg/dL    AST 15 5 - 45 U/L    ALT 26 12 - 78 U/L    Alkaline Phosphatase 53 46 - 116 U/L    Total Protein 7 9 6 4 - 8 4 g/dL    Albumin 3 1 (L) 3 5 - 5 0 g/dL    Total Bilirubin 0 35 0 20 - 1 00 mg/dL    eGFR 107 ml/min/1 73sq m   TSH, 3rd generation   Result Value Ref Range    TSH 3RD GENERATON 1 470 0 450 - 4 500 uIU/mL   Lipid panel   Result Value Ref Range    Cholesterol 150 See Comment mg/dL    Triglycerides 105 See Comment mg/dL    HDL, Direct 60 >=50 mg/dL    LDL Calculated 69 0 - 100 mg/dL    Non-HDL-Chol (CHOL-HDL) 90 mg/dl   Iron Saturation %   Result Value Ref Range    Iron Saturation 17 15 - 50 %    TIBC 397 250 - 450 ug/dL    Iron 68 50 - 170 ug/dL   Ferritin   Result Value Ref Range    Ferritin 6 (L) 8 - 388 ng/mL       No orders of the defined types were placed in this encounter  Current Medications     Current Outpatient Medications   Medication Sig Dispense Refill   • aspirin 81 mg chewable tablet Chew 81 mg daily as needed     • meloxicam (MOBIC) 15 mg tablet Take 1 tablet (15 mg total) by mouth daily as needed for moderate pain 90 tablet 0   • norgestimate-ethinyl estradiol (Tri-Lo-Sprintec) 0 18/0 215/0 25 MG-25 MCG per tablet Take 1 tablet by mouth daily 84 tablet 4   • omeprazole (PriLOSEC) 20 mg delayed release capsule Take 20 mg by mouth daily     • topiramate (Topamax) 25 mg tablet Take 1 tablet (25 mg total) by mouth daily 90 tablet 0   • loratadine (CLARITIN) 10 mg tablet Take 1 tablet by mouth daily       No current facility-administered medications for this visit  ALLERGIES:  No Known Allergies      Candelario Ramirez MD   [address]  6/5/2023  8:36 AM    Parts of this note were dictated using [employer] dictation software and may have sounds-like errors due to variation in pronunciation

## 2023-06-09 ENCOUNTER — TELEMEDICINE (OUTPATIENT)
Dept: PSYCHIATRY | Facility: CLINIC | Age: 43
End: 2023-06-09
Payer: COMMERCIAL

## 2023-06-09 DIAGNOSIS — F41.1 GAD (GENERALIZED ANXIETY DISORDER): Primary | ICD-10-CM

## 2023-06-09 PROCEDURE — 90832 PSYTX W PT 30 MINUTES: CPT

## 2023-06-09 NOTE — PSYCH
Virtual Regular Visit    Verification of patient location:    Patient is located at Home in the following state in which I hold an active license [place]      Assessment/Plan:    Problem List Items Addressed This Visit        Other    KAMI (generalized anxiety disorder) - Primary       Goals addressed in session: Goal 2      Darcy discussed coping skills and techniques she used to reduce her anxiety  Reason for visit is   Chief Complaint   Patient presents with   • Virtual Regular Visit        Encounter provider Alisia White LCSW    Provider located at [address] [place] [id number] [phone]      Recent Visits  No visits were found meeting these conditions  Showing recent visits within past 7 days and meeting all other requirements  Today's Visits  Date Type Provider Dept   [facility]   Showing today's visits and meeting all other requirements  Future Appointments  No visits were found meeting these conditions  Showing future appointments within next 150 days and meeting all other requirements       The patient was identified by name and date of birth  Carmelo Garcia was informed that this is a telemedicine visit and that the visit is being conducted throughthe micecloud platform  She agrees to proceed     My office door was closed  No one else was in the room  She acknowledged consent and understanding of privacy and security of the video platform  The patient has agreed to participate and understands they can discontinue the visit at any time  Patient is aware this is a billable service  Maribell Thao is a 40 y o  female       HPI David Trevino was calm and cooperative with good insight and judgement  Thinking was linear and goal oriented       Past Medical History:   Diagnosis Date   • Abnormal Pap smear of cervix     H/o CINI   • STEFANIA I (cervical intraepithelial neoplasia I)    • GERD (gastroesophageal reflux disease) 10/23/2020   • Migraine    • Papanicolaou smear 03/09/2018    neg   • Radius fracture     Left       Past Surgical History:   Procedure Laterality Date   • COLPOSCOPY     • GYNECOLOGIC CRYOSURGERY  09/2008    STEFANIA I   • TOOTH EXTRACTION N/A 09/01/2018       Current Outpatient Medications   Medication Sig Dispense Refill   • aspirin 81 mg chewable tablet Chew 81 mg daily as needed     • loratadine (CLARITIN) 10 mg tablet Take 1 tablet by mouth daily     • meloxicam (MOBIC) 15 mg tablet Take 1 tablet (15 mg total) by mouth daily as needed for moderate pain 90 tablet 0   • norgestimate-ethinyl estradiol (Tri-Lo-Sprintec) 0 18/0 215/0 25 MG-25 MCG per tablet Take 1 tablet by mouth daily 84 tablet 4   • omeprazole (PriLOSEC) 20 mg delayed release capsule Take 20 mg by mouth daily     • topiramate (Topamax) 25 mg tablet Take 1 tablet (25 mg total) by mouth daily 90 tablet 0     No current facility-administered medications for this visit  No Known Allergies    Review of Systems    Video Exam    There were no vitals filed for this visit  Physical Exam     Behavioral Health Psychotherapy Progress Note    Psychotherapy Provided: Individual Psychotherapy     1  KAMI (generalized anxiety disorder)            Goals addressed in session: Goal 2     DATA: During this session Darcy reported that she continues to do well  She reported going on a trip to [place] and having anxiety the night before, but that she was able to use coping skills and push through without issues and ended up being very comfortable and enjoying her trip  She talked about improved relationship with her mother at home saying they have not been arguing much, work is good, and her new part time job is good as well  Clinician complimented [facility] and supported her success    Clinician and [facility] decided that [facility] would be taken out of clinician's schedule as monthly "check in's are not necessary at this time, but WILLIAM HILARIO requested not to be formally discharged yet as she still wants to have the option of reaching out should she need it  Darcy and clinician agreed to touch base via email in August to see if [facility] needs to be seen or would like to be discharged  During this session, this clinician used the following therapeutic modalities: Motivational Interviewing, Solution-Focused Therapy and Supportive Psychotherapy    Substance Abuse was not addressed during this session  If the client is diagnosed with a co-occurring substance use disorder, please indicate any changes in the frequency or amount of use: n/a  Stage of change for addressing substance use diagnoses: No substance use/Not applicable    ASSESSMENT:  Corwin Graf presents with a Euthymic/ normal mood  her affect is Normal range and intensity, which is congruent, with her mood and the content of the session  The client has made progress on their goals  In interviewing Corwin Graf presents with a none risk of suicide, none risk of self-harm, and none risk of harm to others  For any risk assessment that surpasses a \"low\" rating, a safety plan must be developed  A safety plan was indicated: no  If yes, describe in detail n/a    PLAN: Between sessions, Corwin Graf will continue to manage anxiety by staying busy and using coping skills  At the next session, the therapist will use Client-centered Therapy, Motivational Interviewing, Solution-Focused Therapy and Supportive Psychotherapy to address lingering anxiety, life stressors  Behavioral Health Treatment Plan and Discharge Planning: Corwin Graf is aware of and agrees to continue to work on their treatment plan  They have identified and are working toward their discharge goals   yes    Visit start and stop times:    06/09/23  Start Time: 7100  Stop Time: 1435  Total Visit Time: 32 minutes      "

## 2023-07-14 ENCOUNTER — TELEMEDICINE (OUTPATIENT)
Dept: PSYCHIATRY | Facility: CLINIC | Age: 43
End: 2023-07-14
Payer: COMMERCIAL

## 2023-07-14 DIAGNOSIS — F41.1 GAD (GENERALIZED ANXIETY DISORDER): Primary | ICD-10-CM

## 2023-07-14 PROCEDURE — 90834 PSYTX W PT 45 MINUTES: CPT

## 2023-07-14 NOTE — PSYCH
Virtual Regular Visit    Verification of patient location:    Patient is located at Home in the following state in which I hold an active license [place]      Assessment/Plan:    Problem List Items Addressed This Visit        Other    KAMI (generalized anxiety disorder) - Primary       Goals addressed in session: Goal 2      Darcy was solution focused in developing coping skills to use to face a new stressor at work. Reason for visit is No chief complaint on file. Encounter provider Carly Pisano LCSW    Provider located at [address] [place] [id number] [phone]      Recent Visits  No visits were found meeting these conditions. Showing recent visits within past 7 days and meeting all other requirements  Today's Visits  Date Type Provider Dept   [facility]   Showing today's visits and meeting all other requirements  Future Appointments  No visits were found meeting these conditions. Showing future appointments within next 150 days and meeting all other requirements       The patient was identified by name and date of birth. Deepak Alvarez was informed that this is a telemedicine visit and that the visit is being conducted throughthe [employer] platform. She agrees to proceed. .  My office door was closed. No one else was in the room. She acknowledged consent and understanding of privacy and security of the video platform. The patient has agreed to participate and understands they can discontinue the visit at any time. Patient is aware this is a billable service. Ez Mosley is a 44 y.o. female       HPI Marjorie Real was calm and cooperative with good insight and judgement. Thinking was linear and goal oriented.      Past Medical History:   Diagnosis Date   • Abnormal Pap smear of cervix     H/o CINI   • STEFANIA I (cervical intraepithelial neoplasia I)    • GERD (gastroesophageal reflux disease) 10/23/2020   • Migraine    • Papanicolaou smear 03/09/2018    neg   • Radius fracture     Left       Past Surgical History:   Procedure Laterality Date   • COLPOSCOPY     • GYNECOLOGIC CRYOSURGERY  09/2008    STEFANIA I   • TOOTH EXTRACTION N/A 09/01/2018       Current Outpatient Medications   Medication Sig Dispense Refill   • aspirin 81 mg chewable tablet Chew 81 mg daily as needed     • loratadine (CLARITIN) 10 mg tablet Take 1 tablet by mouth daily     • meloxicam (MOBIC) 15 mg tablet Take 1 tablet (15 mg total) by mouth daily as needed for moderate pain 90 tablet 0   • norgestimate-ethinyl estradiol (Tri-Lo-Sprintec) 0.18/0.215/0.25 MG-25 MCG per tablet Take 1 tablet by mouth daily 84 tablet 4   • omeprazole (PriLOSEC) 20 mg delayed release capsule Take 20 mg by mouth daily     • topiramate (Topamax) 25 mg tablet Take 1 tablet (25 mg total) by mouth daily 90 tablet 0     No current facility-administered medications for this visit. No Known Allergies    Review of Systems    Video Exam    There were no vitals filed for this visit. Physical Exam     Behavioral Health Psychotherapy Progress Note    Psychotherapy Provided: Individual Psychotherapy     1. KAMI (generalized anxiety disorder)            Goals addressed in session: Goal 2     DATA: During this session Darcy voiced feeling very stressed and anxious about having to move positions at work. Clinician supported and validated Darcy and she and clinician were solution focused with clinician encouraging Darcy to try and see this circumstance from a different perspective. Clinician assisted Darcy in viewing the position switch from a more positive stance as a means to assist Blair in reducing anxiety. Discussion included Darcy being organized, having a plan in place, mental plan on how she will interact with her manager, as well as possibly looking for a new job.   Blair was in agreement. She was solution focused in how she will approach the change in position, she agreed to be proactive and organized trying to get off on the best food possible despite being very anxious about it. During this session, this clinician used the following therapeutic modalities: Client-centered Therapy, Motivational Interviewing, Solution-Focused Therapy and Supportive Psychotherapy    Substance Abuse was not addressed during this session. If the client is diagnosed with a co-occurring substance use disorder, please indicate any changes in the frequency or amount of use: n/a. Stage of change for addressing substance use diagnoses: No substance use/Not applicable    ASSESSMENT:  Meryl Jarvis presents with a Euthymic/ normal mood. her affect is Normal range and intensity, which is congruent, with her mood and the content of the session. The client has made progress on their goals. In interviewing Meryl Jarvis presents with a none risk of suicide, none risk of self-harm, and none risk of harm to others. For any risk assessment that surpasses a "low" rating, a safety plan must be developed. A safety plan was indicated: no  If yes, describe in detail n/a    PLAN: Between sessions, Meryl Jarvis will use coping skills and mental plans developed in session to prepare for her new position at work. At the next session, the therapist will use Client-centered Therapy, Motivational Interviewing, Solution-Focused Therapy and Supportive Psychotherapy to address anxiety and stress. Behavioral Health Treatment Plan and Discharge Planning: Meryl Jarvis is aware of and agrees to continue to work on their treatment plan. They have identified and are working toward their discharge goals.  yes    Visit start and stop times:    07/14/23  Start Time: 1101  Stop Time: 1146  Total Visit Time: 45 minutes

## 2023-07-17 ENCOUNTER — OFFICE VISIT (OUTPATIENT)
Dept: FAMILY MEDICINE CLINIC | Facility: CLINIC | Age: 43
End: 2023-07-17
Payer: COMMERCIAL

## 2023-07-17 VITALS
HEART RATE: 83 BPM | BODY MASS INDEX: 36.61 KG/M2 | HEIGHT: 59 IN | SYSTOLIC BLOOD PRESSURE: 111 MMHG | OXYGEN SATURATION: 99 % | DIASTOLIC BLOOD PRESSURE: 70 MMHG | RESPIRATION RATE: 18 BRPM | TEMPERATURE: 97.9 F | WEIGHT: 181.6 LBS

## 2023-07-17 DIAGNOSIS — K21.9 GASTROESOPHAGEAL REFLUX DISEASE WITHOUT ESOPHAGITIS: ICD-10-CM

## 2023-07-17 DIAGNOSIS — F41.1 GAD (GENERALIZED ANXIETY DISORDER): Primary | ICD-10-CM

## 2023-07-17 PROCEDURE — 99214 OFFICE O/P EST MOD 30 MIN: CPT | Performed by: FAMILY MEDICINE

## 2023-07-17 NOTE — PROGRESS NOTES
Lifestyle Medicine Office Visit  Meryl Jarvis 44 y.o. female   MRN: [id number] : 1980  ENCOUNTER: 2023 10:42 AM    Assessment and Plan   Lifestyle Medicine Interventions- Nabeel Saucedo has been doing excellent work! We agreed to the following updated plan:    Nutrition  Darcy would like to increase fiber by adding 1/2c of beans at least 3x/week. She would also like to have breakfast 7d/week. Physical Activity  Darcy will go for at least one brisk walk between now and our next visit. We discussed how this may help her to recognize that she feels pretty good afterward. We also discussed the value of seeking an exercise "lyndon."     [place] will continue to see her therapist.    Nutrition Assessment and Intervention:     New Nutrition Prescription completed with patient      Physical Activity Assessment and Intervention:    Physical Activity Prescription completed with patient      Emotional and Mental Well-being, Sleep, Connectedness Assessment and Intervention:    Stress management plan created with patient      Therapeutic Lifestyle Change Visit:     One-on-one comprehensive counseling, coaching, and health behavior change visit completed          RTC 6 weeks, with group session between now and then    Reason for Visit     Chief Complaint   Patient presents with   • OTHER     Recheck lifestyle        History of Present Illness   Meryl Jarvis is a 44y.o.-year-old female who presents today for Lifestyle follow-up. Our plan at last visit was:    Nutrition - Use CSA produce to create the majority of her meals. Reduce addition of dairy (cheese) to her home-cooked meals/snacks to no more than once weekly. Continue to drink lots of water and avoid sugar-sweetened beverages. Using CSA more or less each week. Signed up for plant-based cooking class round 2. This starts tomorrow. Not adding cheese to meals at home or buying cheese. Consuming more cheese socially.   Only going to [facility] twice/month.       Physical Activity - Add 3 x 15 min brisk walks each week. Thiago Mcdonald has not been out yet.     Mental Health - Continue to attend now-monthly sessions of therapy. This is going well, last seen last week. Review of Systems   Review of Systems   Constitutional: Negative for activity change, chills, fatigue and fever. HENT: Negative for congestion, sinus pressure, sinus pain and sore throat. Respiratory: Negative for cough, shortness of breath and wheezing. Cardiovascular: Negative for chest pain, palpitations and leg swelling. Gastrointestinal: Negative for abdominal pain, diarrhea, nausea and vomiting. Genitourinary: Negative for decreased urine volume, dysuria, frequency and urgency. Musculoskeletal: Negative for arthralgias, myalgias, neck pain and neck stiffness. Skin: Negative for rash. Neurological: Negative for dizziness, light-headedness, numbness and headaches. Active Problem List     Patient Active Problem List   Diagnosis   • Obesity, Class I, BMI 30-34.9   • Migraine without aura, not intractable   • GERD (gastroesophageal reflux disease)   • Allergies   • KAMI (generalized anxiety disorder)       Past Medical History, Past Surgical History, Family History, and Social History were reviewed and updated today as appropriate. Objective   /70 (BP Location: Right arm, Patient Position: Sitting, Cuff Size: Large)   Pulse 83   Temp 97.9 °F (36.6 °C) (Tympanic)   Resp 18   Ht 4' 11" (1.499 m)   Wt 82.4 kg (181 lb 9.6 oz)   SpO2 99%   BMI 36.68 kg/m²     Physical Exam  Constitutional:       Appearance: Normal appearance. HENT:      Head: Normocephalic and atraumatic. Eyes:      Conjunctiva/sclera: Conjunctivae normal.      Pupils: Pupils are equal, round, and reactive to light. Cardiovascular:      Rate and Rhythm: Normal rate and regular rhythm. Heart sounds: No murmur heard. No friction rub.    Pulmonary:      Effort: Pulmonary effort is normal. No respiratory distress. Breath sounds: Normal breath sounds. Neurological:      Mental Status: She is alert and oriented to person, place, and time.          Pertinent Laboratory/Diagnostic Studies:  Lab Results   Component Value Date    BUN 12 02/13/2023    CREATININE 0.70 02/13/2023    CALCIUM 9.4 02/13/2023    K 4.1 02/13/2023    CO2 25 02/13/2023     02/13/2023       No results found for: "TSH"    Lab Results   Component Value Date    CHOL 155 07/07/2015     Lab Results   Component Value Date    TRIG 105 02/13/2023     Lab Results   Component Value Date    HDL 60 02/13/2023     Lab Results   Component Value Date    LDLCALC 69 02/13/2023     Lab Results   Component Value Date    HGBA1C 5.3 07/02/2022       Results for orders placed or performed in visit on 02/13/23   CBC and differential   Result Value Ref Range    WBC 8.81 4.31 - 10.16 Thousand/uL    RBC 4.36 3.81 - 5.12 Million/uL    Hemoglobin 12.3 11.5 - 15.4 g/dL    Hematocrit 39.5 34.8 - 46.1 %    MCV 91 82 - 98 fL    MCH 28.2 26.8 - 34.3 pg    MCHC 31.1 (L) 31.4 - 37.4 g/dL    RDW 15.7 (H) 11.6 - 15.1 %    MPV 9.2 8.9 - 12.7 fL    Platelets 941 (H) 849 - 390 Thousands/uL    nRBC 0 /100 WBCs    Neutrophils Relative 55 43 - 75 %    Immat GRANS % 1 0 - 2 %    Lymphocytes Relative 32 14 - 44 %    Monocytes Relative 8 4 - 12 %    Eosinophils Relative 3 0 - 6 %    Basophils Relative 1 0 - 1 %    Neutrophils Absolute 4.95 1.85 - 7.62 Thousands/µL    Immature Grans Absolute 0.04 0.00 - 0.20 Thousand/uL    Lymphocytes Absolute 2.78 0.60 - 4.47 Thousands/µL    Monocytes Absolute 0.74 0.17 - 1.22 Thousand/µL    Eosinophils Absolute 0.22 0.00 - 0.61 Thousand/µL    Basophils Absolute 0.08 0.00 - 0.10 Thousands/µL   Comprehensive metabolic panel   Result Value Ref Range    Sodium 136 135 - 147 mmol/L    Potassium 4.1 3.5 - 5.3 mmol/L    Chloride 104 96 - 108 mmol/L    CO2 25 21 - 32 mmol/L    ANION GAP 7 4 - 13 mmol/L    BUN 12 5 - 25 mg/dL Creatinine 0.70 0.60 - 1.30 mg/dL    Glucose, Fasting 72 65 - 99 mg/dL    Calcium 9.4 8.3 - 10.1 mg/dL    Corrected Calcium 10.1 8.3 - 10.1 mg/dL    AST 15 5 - 45 U/L    ALT 26 12 - 78 U/L    Alkaline Phosphatase 53 46 - 116 U/L    Total Protein 7.9 6.4 - 8.4 g/dL    Albumin 3.1 (L) 3.5 - 5.0 g/dL    Total Bilirubin 0.35 0.20 - 1.00 mg/dL    eGFR 107 ml/min/1.73sq m   TSH, 3rd generation   Result Value Ref Range    TSH 3RD GENERATON 1.470 0.450 - 4.500 uIU/mL   Lipid panel   Result Value Ref Range    Cholesterol 150 See Comment mg/dL    Triglycerides 105 See Comment mg/dL    HDL, Direct 60 >=50 mg/dL    LDL Calculated 69 0 - 100 mg/dL    Non-HDL-Chol (CHOL-HDL) 90 mg/dl   Iron Saturation %   Result Value Ref Range    Iron Saturation 17 15 - 50 %    TIBC 397 250 - 450 ug/dL    Iron 68 50 - 170 ug/dL   Ferritin   Result Value Ref Range    Ferritin 6 (L) 8 - 388 ng/mL       No orders of the defined types were placed in this encounter. Current Medications     Current Outpatient Medications   Medication Sig Dispense Refill   • aspirin 81 mg chewable tablet Chew 81 mg daily as needed     • loratadine (CLARITIN) 10 mg tablet Take 1 tablet by mouth daily     • meloxicam (MOBIC) 15 mg tablet Take 1 tablet (15 mg total) by mouth daily as needed for moderate pain 90 tablet 0   • norgestimate-ethinyl estradiol (Tri-Lo-Sprintec) 0.18/0.215/0.25 MG-25 MCG per tablet Take 1 tablet by mouth daily 84 tablet 4   • omeprazole (PriLOSEC) 20 mg delayed release capsule Take 20 mg by mouth daily     • topiramate (Topamax) 25 mg tablet Take 1 tablet (25 mg total) by mouth daily 90 tablet 0     No current facility-administered medications for this visit. ALLERGIES:  No Known Allergies      Tatianna Chao MD   [address]  7/17/2023  10:42 AM    Parts of this note were dictated using [email or web address] dictation software and may have sounds-like errors due to variation in pronunciation.

## 2023-07-21 ENCOUNTER — TELEMEDICINE (OUTPATIENT)
Dept: PSYCHIATRY | Facility: CLINIC | Age: 43
End: 2023-07-21
Payer: COMMERCIAL

## 2023-07-21 DIAGNOSIS — F41.1 GAD (GENERALIZED ANXIETY DISORDER): Primary | ICD-10-CM

## 2023-07-21 PROCEDURE — 90834 PSYTX W PT 45 MINUTES: CPT

## 2023-08-04 ENCOUNTER — TELEMEDICINE (OUTPATIENT)
Dept: PSYCHIATRY | Facility: CLINIC | Age: 43
End: 2023-08-04
Payer: COMMERCIAL

## 2023-08-04 DIAGNOSIS — F41.1 GAD (GENERALIZED ANXIETY DISORDER): Primary | ICD-10-CM

## 2023-08-04 PROCEDURE — 90834 PSYTX W PT 45 MINUTES: CPT

## 2023-08-04 NOTE — BH TREATMENT PLAN
[address] [facility] [place]  1980     Date of Initial Psychotherapy Assessment: 2-3-23  Date of Current Treatment Plan: 08/04/23  Treatment Plan Target Date: 2-4-24  Treatment Plan Expiration Date: 2-4-24    Diagnosis:   1. KAMI (generalized anxiety disorder)            Area(s) of Need: Over the past six months Darcy has done well in managing and reducing anxious feelings. Since her last treatment plan Ning Mas has obtained a part time job, which she loves, has been proactive in going out and doing things either by herself or with friends, and has made doctors appointments to take initiative with her health and this has help to improve her mood and reduce anxious thoughts and feelings. Ning Mas would like to continue with outpatient therapy for maintenance to ensure marked and consistent reduction in anxious feelings. Long Term Goal 1 (in the client's own words): Continue to feel better and work toward goals. Stage of Change: Maintenance    Target Date for completion: 2-4-24     Anticipated therapeutic modalities: Client Centered therapy, motivational interviewing, solution focused and supportive therapy     People identified to complete this goal: Darcykerri Antoine      Objective 1: Ning Mas will meet with therapist once a month or as needed to process her thoughts and emotions regarding life stress and ensure maintenance of coping skills, relaxation techniques, and supports to ensure ongoing reduction in anxiety. I am currently under the care of a [facility] psychiatric provider: yes    My [facility] psychiatric provider is: Jose Pickard LCSW    I am currently taking psychiatric medications: No    I feel that I will be ready for discharge from mental health care when I reach the following (measurable goal/objective): consistent marked reduction in anxiety.      For children and adults who have a legal guardian:   Has there been any change to custody orders and/or guardianship status? NA. If yes, attach updated documentation. I have updated my Crisis Plan and have been offered a copy of this plan    [address]: Diagnosis and Treatment Plan explained to [facility] acknowledges an understanding of their diagnosis. Robert Armendariz agrees to this treatment plan. I have been offered a copy of this Treatment Plan. yes    Robert Armendariz, 1980, actively participated in the review and update of this treatment plan during a virtual session, using the [employer] platform. Robert Armendariz  provided verbal consent on 8/4/2023 at 0320 PM. The treatment plan was transcribed into the [employer] Record at a later time.

## 2023-08-04 NOTE — PSYCH
Virtual Regular Visit    Verification of patient location:    Patient is located at Home in the following state in which I hold an active license [place]      Assessment/Plan:    Problem List Items Addressed This Visit        Other    KAMI (generalized anxiety disorder) - Primary       Goals addressed in session: Goal 1      Darcy is consistent with therapy session to process thoughts and emotions to ensure reduction in anxiety. Reason for visit is No chief complaint on file. Encounter provider Frantz Gallagher LCSW    Provider located at [address] [place] [id number] [phone]      Recent Visits  No visits were found meeting these conditions. Showing recent visits within past 7 days and meeting all other requirements  Today's Visits  Date Type Provider Dept   08/04/23 [address]   Showing today's visits and meeting all other requirements  Future Appointments  No visits were found meeting these conditions. Showing future appointments within next 150 days and meeting all other requirements       The patient was identified by name and date of birth. Tre Christopher was informed that this is a telemedicine visit and that the visit is being conducted throughthe [employer] platform. She agrees to proceed. .  My office door was closed. No one else was in the room. She acknowledged consent and understanding of privacy and security of the video platform. The patient has agreed to participate and understands they can discontinue the visit at any time. Patient is aware this is a billable service. Vincent Hernandez is a 44 y.o. female       HPI Osker Sever was calm and cooperative with good insight and judgement. Thinking was linear and goal oriented.      Past Medical History:   Diagnosis Date   • Abnormal Pap smear of cervix     H/o CINI   • STEFANIA I (cervical intraepithelial neoplasia I)    • GERD (gastroesophageal reflux disease) 10/23/2020   • Migraine    • Papanicolaou smear 03/09/2018    neg   • Radius fracture     Left       Past Surgical History:   Procedure Laterality Date   • COLPOSCOPY     • GYNECOLOGIC CRYOSURGERY  09/2008    STEFANIA I   • TOOTH EXTRACTION N/A 09/01/2018       Current Outpatient Medications   Medication Sig Dispense Refill   • aspirin 81 mg chewable tablet Chew 81 mg daily as needed     • loratadine (CLARITIN) 10 mg tablet Take 1 tablet by mouth daily     • meloxicam (MOBIC) 15 mg tablet Take 1 tablet (15 mg total) by mouth daily as needed for moderate pain 90 tablet 0   • norgestimate-ethinyl estradiol (Tri-Lo-Sprintec) 0.18/0.215/0.25 MG-25 MCG per tablet Take 1 tablet by mouth daily 84 tablet 4   • omeprazole (PriLOSEC) 20 mg delayed release capsule Take 20 mg by mouth daily     • topiramate (Topamax) 25 mg tablet Take 1 tablet (25 mg total) by mouth daily 90 tablet 0     No current facility-administered medications for this visit. No Known Allergies    Review of Systems    Video Exam    There were no vitals filed for this visit. Physical Exam     Behavioral Health Psychotherapy Progress Note    Psychotherapy Provided: Individual Psychotherapy     1. KAMI (generalized anxiety disorder)            Goals addressed in session: Goal 1     DATA: During this session Andreia's treatment plan was updated. She also discussed frustrations with an ongoing conflict between her, her mother, and her brother and sister in law. Bonita Simmonds reported that she feel she is coping well with the situation and is being a support for her mother during this time as the conflict is more so impacting her mother. Darcy added that she continues to be solution focused in pursuing different positions at work due to being unhappy in her current position.   Overall, in discussion Bonita Simmonds said that she doesn't really feel terribly anxious anymore, she has been taking things in stride, staying busy, working her part time job which she loves, being with friends, and this has helped her. She agreed to monthly check-ins to ensure consistent reduction in anxiety. During this session, this clinician used the following therapeutic modalities: Client-centered Therapy, Motivational Interviewing and Supportive Psychotherapy    Substance Abuse was not addressed during this session. If the client is diagnosed with a co-occurring substance use disorder, please indicate any changes in the frequency or amount of use: n/a. Stage of change for addressing substance use diagnoses: No substance use/Not applicable    ASSESSMENT:  Mary Jane Spivey presents with a Euthymic/ normal mood. her affect is Normal range and intensity, which is congruent, with her mood and the content of the session. The client has made progress on their goals. In interviewing Mary Jane Spivey presents with a none risk of suicide, none risk of self-harm, and none risk of harm to others. For any risk assessment that surpasses a "low" rating, a safety plan must be developed. A safety plan was indicated: no  If yes, describe in detail n/a    PLAN: Between sessions, Mary Jane Spivey will continue to use coping skills to manage any anxious feelings. At the next session, the therapist will use Client-centered Therapy, Motivational Interviewing, Solution-Focused Therapy and Supportive Psychotherapy to address any lingering anxiety. Behavioral Health Treatment Plan and Discharge Planning: Mary Jane Spivey is aware of and agrees to continue to work on their treatment plan. They have identified and are working toward their discharge goals.  yes    Visit start and stop times:    08/04/23  Start Time: 1502  Stop Time: 1542  Total Visit Time: 40 minutes

## 2023-08-05 ENCOUNTER — APPOINTMENT (OUTPATIENT)
Dept: LAB | Facility: MEDICAL CENTER | Age: 43
End: 2023-08-05

## 2023-08-05 DIAGNOSIS — Z00.8 HEALTH EXAMINATION IN POPULATION SURVEYS: ICD-10-CM

## 2023-08-05 LAB
CHOLEST SERPL-MCNC: 176 MG/DL
HDLC SERPL-MCNC: 53 MG/DL
LDLC SERPL CALC-MCNC: 104 MG/DL (ref 0–100)
NONHDLC SERPL-MCNC: 123 MG/DL
TRIGL SERPL-MCNC: 93 MG/DL

## 2023-08-05 PROCEDURE — 80061 LIPID PANEL: CPT

## 2023-08-05 PROCEDURE — 36415 COLL VENOUS BLD VENIPUNCTURE: CPT

## 2023-08-07 ENCOUNTER — OFFICE VISIT (OUTPATIENT)
Dept: FAMILY MEDICINE CLINIC | Facility: CLINIC | Age: 43
End: 2023-08-07
Payer: COMMERCIAL

## 2023-08-07 VITALS
WEIGHT: 183.2 LBS | HEART RATE: 110 BPM | DIASTOLIC BLOOD PRESSURE: 72 MMHG | TEMPERATURE: 97.7 F | OXYGEN SATURATION: 98 % | BODY MASS INDEX: 36.93 KG/M2 | HEIGHT: 59 IN | SYSTOLIC BLOOD PRESSURE: 120 MMHG

## 2023-08-07 DIAGNOSIS — G43.009 MIGRAINE WITHOUT AURA AND WITHOUT STATUS MIGRAINOSUS, NOT INTRACTABLE: Primary | ICD-10-CM

## 2023-08-07 DIAGNOSIS — K21.9 GASTROESOPHAGEAL REFLUX DISEASE WITHOUT ESOPHAGITIS: ICD-10-CM

## 2023-08-07 PROCEDURE — 99213 OFFICE O/P EST LOW 20 MIN: CPT | Performed by: INTERNAL MEDICINE

## 2023-08-07 RX ORDER — TOPIRAMATE 50 MG/1
50 TABLET, FILM COATED ORAL DAILY
Qty: 90 TABLET | Refills: 3 | Status: SHIPPED | OUTPATIENT
Start: 2023-08-07

## 2023-08-07 NOTE — PROGRESS NOTES
Name: Vin Santacruz      : 1980      MRN: [id number]  Encounter Provider: Patrice Duque MD  Encounter Date: 2023   Encounter department: [facility]     1. Migraine without aura and without status migrainosus, not intractable  Assessment & Plan:  She continues to have migraines that interfere with ability to work and with nausea. She was started on low-dose Topamax at last visit we will increase it to 50    Orders:  -     topiramate (Topamax) 50 MG tablet; Take 1 tablet (50 mg total) by mouth daily    2. BMI 37.0-37.9, adult  Assessment & Plan:  Unfortunately she has gained weight and again was counseled on diet and exercise      3. Gastroesophageal reflux disease without esophagitis  Assessment & Plan:  She continues OTC Meprazole. Depression Screening and Follow-up Plan: Patient was screened for depression during today's encounter. They screened negative with a PHQ-2 score of 0. Subjective      Patient is here for checkup and refill of medications but she also needs [facility] paperwork filled out. Migraine  This is a chronic problem. The current episode started more than 1 year ago. The problem occurs monthly. The problem is unchanged. The pain does not radiate. The quality of the pain is described as pulsating, band-like and throbbing. The pain is at a severity of 8/10. The pain is severe. Associated symptoms include nausea and photophobia. Pertinent negatives include no abdominal pain, back pain, coughing, diarrhea, dizziness, ear pain, fever, hearing loss, neck pain, numbness, sinus pressure, sore throat or weakness. The symptoms are aggravated by menstrual cycle, bright light and weather changes. Past treatments include acetaminophen, darkened room, cold packs and NSAIDs. The treatment provided significant relief. Review of Systems   Constitutional: Negative for chills, fatigue, fever and unexpected weight change.    HENT: Negative for congestion, ear pain, hearing loss, postnasal drip, sinus pressure, sore throat, trouble swallowing and voice change. Eyes: Positive for photophobia. Negative for visual disturbance. Respiratory: Negative for cough, chest tightness, shortness of breath and wheezing. Cardiovascular: Negative for chest pain, palpitations and leg swelling. Gastrointestinal: Positive for nausea. Negative for abdominal distention, abdominal pain, anal bleeding, blood in stool, constipation and diarrhea. Endocrine: Negative for cold intolerance, polydipsia, polyphagia and polyuria. Genitourinary: Negative for dysuria, flank pain, frequency, hematuria and urgency. Musculoskeletal: Negative for arthralgias, back pain, gait problem, joint swelling, myalgias and neck pain. Skin: Negative for rash. Allergic/Immunologic: Negative for immunocompromised state. Neurological: Positive for headaches. Negative for dizziness, syncope, facial asymmetry, weakness, light-headedness and numbness. Hematological: Negative for adenopathy. Psychiatric/Behavioral: Negative for confusion, sleep disturbance and suicidal ideas.        Current Outpatient Medications on File Prior to Visit   Medication Sig   • aspirin 81 mg chewable tablet Chew 81 mg daily as needed   • loratadine (CLARITIN) 10 mg tablet Take 1 tablet by mouth daily   • meloxicam (MOBIC) 15 mg tablet Take 1 tablet (15 mg total) by mouth daily as needed for moderate pain   • norgestimate-ethinyl estradiol (Tri-Lo-Sprintec) 0.18/0.215/0.25 MG-25 MCG per tablet Take 1 tablet by mouth daily   • omeprazole (PriLOSEC) 20 mg delayed release capsule Take 20 mg by mouth daily   • [DISCONTINUED] topiramate (Topamax) 25 mg tablet Take 1 tablet (25 mg total) by mouth daily       Objective     /72 (BP Location: Right arm, Patient Position: Sitting, Cuff Size: Standard)   Pulse (!) 110   Temp 97.7 °F (36.5 °C)   Ht 4' 11" (1.499 m)   Wt 83.1 kg (183 lb 3.2 oz)   SpO2 98%   BMI 37.00 kg/m²     Physical Exam  Constitutional:       General: She is not in acute distress. Appearance: She is well-developed. She is obese. HENT:      Right Ear: External ear normal.      Left Ear: External ear normal.      Nose: Nose normal.      Mouth/Throat:      Pharynx: No oropharyngeal exudate. Eyes:      Pupils: Pupils are equal, round, and reactive to light. Neck:      Thyroid: No thyromegaly. Vascular: No JVD. Cardiovascular:      Rate and Rhythm: Normal rate and regular rhythm. Heart sounds: Normal heart sounds. No murmur heard. No gallop. Pulmonary:      Effort: Pulmonary effort is normal. No respiratory distress. Breath sounds: Normal breath sounds. No wheezing or rales. Abdominal:      General: Bowel sounds are normal. There is no distension. Palpations: Abdomen is soft. There is no mass. Tenderness: There is no abdominal tenderness. Musculoskeletal:         General: No tenderness. Normal range of motion. Cervical back: Normal range of motion and neck supple. Right lower leg: No edema. Left lower leg: Edema present. Lymphadenopathy:      Cervical: No cervical adenopathy. Skin:     General: Skin is warm and dry. Findings: No rash. Neurological:      General: No focal deficit present. Mental Status: She is alert and oriented to person, place, and time. Cranial Nerves: No cranial nerve deficit. Coordination: Coordination normal.   Psychiatric:         Mood and Affect: Mood normal.         Behavior: Behavior normal.         Thought Content:  Thought content normal.         Judgment: Judgment normal.       Gustavo Mcfadden MD

## 2023-08-07 NOTE — ASSESSMENT & PLAN NOTE
She continues to have migraines that interfere with ability to work and with nausea.   She was started on low-dose Topamax at last visit we will increase it to 50

## 2023-08-08 DIAGNOSIS — Z30.41 SURVEILLANCE FOR BIRTH CONTROL, ORAL CONTRACEPTIVES: ICD-10-CM

## 2023-08-09 RX ORDER — NORGESTIMATE AND ETHINYL ESTRADIOL 7DAYSX3 LO
1 KIT ORAL DAILY
Qty: 84 TABLET | Refills: 0 | OUTPATIENT
Start: 2023-08-09

## 2023-08-16 ENCOUNTER — TELEMEDICINE (OUTPATIENT)
Dept: FAMILY MEDICINE CLINIC | Facility: CLINIC | Age: 43
End: 2023-08-16
Payer: COMMERCIAL

## 2023-08-16 VITALS — HEIGHT: 59 IN | WEIGHT: 183 LBS | BODY MASS INDEX: 36.89 KG/M2

## 2023-08-16 DIAGNOSIS — U07.1 COVID-19: Primary | ICD-10-CM

## 2023-08-16 PROCEDURE — 99213 OFFICE O/P EST LOW 20 MIN: CPT | Performed by: FAMILY MEDICINE

## 2023-08-16 NOTE — PROGRESS NOTES
COVID-19 Outpatient Progress Note    Assessment/Plan:    Problem List Items Addressed This Visit    None  Visit Diagnoses     COVID-19    -  Primary         Disposition:     Discussed symptom directed medication options with patient. 5 days isolation, 5 days mask. Sx care discussed, including dayquil, alternating tylenol, motrin every 3 hours, fluids, rest, mucinex DM, pt to call if she is feeling worse, can consider molnupiravir    I have spent a total time of 12 minutes on the day of the encounter for this patient including discussing prognosis, risks and benefits of treatment options, instructions for management, importance of treatment compliance, risk factor reductions, impressions and documenting in the medical record. Encounter provider: Jeremi Shields MD     Provider located at: [address] [address] [phone]     Recent Visits  No visits were found meeting these conditions. Showing recent visits within past 7 days and meeting all other requirements  Today's Visits  Date Type Provider Dept   08/16/23 Telemedicine Jeremi Shields MD [facility]   Showing today's visits and meeting all other requirements  Future Appointments  No visits were found meeting these conditions. Showing future appointments within next 150 days and meeting all other requirements     This virtual check-in was done via [address] [place] [address] and patient was informed that this is a secure, HIPAA-compliant platform. She agrees to proceed. Patient agrees to participate in a virtual check in via telephone or video visit instead of presenting to the office to address urgent/immediate medical needs. Patient is aware this is a billable service. She acknowledged consent and understanding of privacy and security of the video platform. The patient has agreed to participate and understands they can discontinue the visit at any time.     After connecting through [facility], the patient was identified by name and date of birth. eDepak Alvarez was informed that this was a telemedicine visit and that the exam was being conducted confidentially over secure lines. My office door was closed. No one else was in the room. Deepak Alvarez acknowledged consent and understanding of privacy and security of the telemedicine visit. I informed the patient that I have reviewed her record in Epic and presented the opportunity for her to ask any questions regarding the visit today. The patient agreed to participate. Verification of patient location:  Patient is located in the following state in which I hold an active license: [place]    Subjective:   Deepak Alvarez is a 44 y.o. female who is concerned about COVID-19. Patient's symptoms include fever, chills, fatigue, malaise, nasal congestion, rhinorrhea, sore throat, loss of taste, cough, diarrhea and headache.  Patient denies shortness of breath, abdominal pain and vomiting.     - Date of symptom onset: 8/14/2023  - Date of exposure: 8/12/2023      COVID-19 vaccination status: Fully vaccinated with booster    Exposure:   Contact with a person who is under investigation (PUI) for or who is positive for COVID-19 within the last 14 days?: Yes    Hospitalized recently for fever and/or lower respiratory symptoms?: No      Currently a healthcare worker that is involved in direct patient care?: No      Works in a special setting where the risk of COVID-19 transmission may be high? (this may include long-term care, correctional and intermediate facilities; homeless shelters; assisted-living facilities and group homes.): No      Resident in a special setting where the risk of COVID-19 transmission may be high? (this may include long-term care, correctional and intermediate facilities; homeless shelters; assisted-living facilities and group homes.): No      Lab Results   Component Value Date    SARSCOV2 Negative 12/27/2021       Review of Systems Constitutional: Positive for chills, fatigue and fever. HENT: Positive for congestion, rhinorrhea and sore throat. Negative for drooling, ear discharge, ear pain and trouble swallowing. Respiratory: Positive for cough and stridor. Negative for shortness of breath. Gastrointestinal: Positive for diarrhea. Negative for abdominal pain and vomiting. Musculoskeletal: Negative for neck pain. Neurological: Positive for headaches. Current Outpatient Medications on File Prior to Visit   Medication Sig   • aspirin 81 mg chewable tablet Chew 81 mg daily as needed   • loratadine (CLARITIN) 10 mg tablet Take 1 tablet by mouth daily   • meloxicam (MOBIC) 15 mg tablet Take 1 tablet (15 mg total) by mouth daily as needed for moderate pain   • norgestimate-ethinyl estradiol (Tri-Lo-Sprintec) 0.18/0.215/0.25 MG-25 MCG per tablet Take 1 tablet by mouth daily   • omeprazole (PriLOSEC) 20 mg delayed release capsule Take 20 mg by mouth daily   • topiramate (Topamax) 50 MG tablet Take 1 tablet (50 mg total) by mouth daily       Objective:    Ht 4' 11" (1.499 m)   Wt 83 kg (183 lb)   LMP 08/02/2023 (Approximate)   BMI 36.96 kg/m²        Physical Exam  Constitutional:       Appearance: She is well-developed. She is ill-appearing. HENT:      Nose: Rhinorrhea present. Mouth/Throat:      Comments: hoarseness  Pulmonary:      Effort: Pulmonary effort is normal.   Musculoskeletal:      Cervical back: Normal range of motion and neck supple. Neurological:      Mental Status: She is alert and oriented to person, place, and time. Psychiatric:         Behavior: Behavior normal.         Thought Content:  Thought content normal.         Judgment: Judgment normal.       Quentin Nathan MD  Answers for HPI/ROS submitted by the patient on 8/16/2023  Chronicity: new  Onset: in the past 7 days  Progression since onset: rapidly worsening  Pain worse on: left  Fever: no fever  Pain - numeric: 8/10  hoarse voice: Yes  plugged ear sensation: Yes  swollen glands: No  strep: No  mono:  No

## 2023-08-16 NOTE — LETTER
August 16, 2023     Patient: Buck Officer  YOB: 1980  Date of Visit: 8/16/2023      To Whom it May Concern:    Buck Officer is under my professional care. Chase Ariza was seen in my office on 8/16/2023. Chase Ariza may return to work on 8/24/23. If you have any questions or concerns, please don't hesitate to call.          Sincerely,          Michelle Dia MD        CC: No Recipients

## 2023-08-30 ENCOUNTER — TELEMEDICINE (OUTPATIENT)
Dept: PSYCHIATRY | Facility: CLINIC | Age: 43
End: 2023-08-30
Payer: COMMERCIAL

## 2023-08-30 DIAGNOSIS — F41.1 GAD (GENERALIZED ANXIETY DISORDER): Primary | ICD-10-CM

## 2023-08-30 PROCEDURE — 90834 PSYTX W PT 45 MINUTES: CPT

## 2023-08-30 NOTE — PSYCH
Virtual Regular Visit    Verification of patient location:    Patient is located at Home in the following state in which I hold an active license [place]      Assessment/Plan:    Problem List Items Addressed This Visit        Other    KAMI (generalized anxiety disorder) - Primary       Goals addressed in session: Goal 1      Darcy remains compliant with monthly sessions to process thoughts and feelings regarding stress, anxiety, and ensure proper coping skills. Reason for visit is   Chief Complaint   Patient presents with   • Virtual Regular Visit        Encounter provider Ester Coffman LCSW    Provider located at [address] [place] [address] Road [address] [phone]      Recent Visits  No visits were found meeting these conditions. Showing recent visits within past 7 days and meeting all other requirements  Today's Visits  Date Type Provider Dept   08/30/23 [address]   Showing today's visits and meeting all other requirements  Future Appointments  No visits were found meeting these conditions. Showing future appointments within next 150 days and meeting all other requirements       The patient was identified by name and date of birth. Janine Quevedo was informed that this is a telemedicine visit and that the visit is being conducted throughthe [employer] platform. She agrees to proceed. .  My office door was closed. No one else was in the room. She acknowledged consent and understanding of privacy and security of the video platform. The patient has agreed to participate and understands they can discontinue the visit at any time. Patient is aware this is a billable service. William Pimentel is a 44 y.o. female       HPI [facility] was calm and cooperative with good insight and judgement. Thinking was linear and goal oriented.      Past Medical History: Diagnosis Date   • Abnormal Pap smear of cervix     H/o CINI   • STEFANIA I (cervical intraepithelial neoplasia I)    • GERD (gastroesophageal reflux disease) 10/23/2020   • Migraine    • Papanicolaou smear 03/09/2018    neg   • Radius fracture     Left       Past Surgical History:   Procedure Laterality Date   • COLPOSCOPY     • GYNECOLOGIC CRYOSURGERY  09/2008    STEFANIA I   • TOOTH EXTRACTION N/A 09/01/2018       Current Outpatient Medications   Medication Sig Dispense Refill   • aspirin 81 mg chewable tablet Chew 81 mg daily as needed     • loratadine (CLARITIN) 10 mg tablet Take 1 tablet by mouth daily     • meloxicam (MOBIC) 15 mg tablet Take 1 tablet (15 mg total) by mouth daily as needed for moderate pain 90 tablet 0   • norgestimate-ethinyl estradiol (Tri-Lo-Sprintec) 0.18/0.215/0.25 MG-25 MCG per tablet Take 1 tablet by mouth daily 84 tablet 4   • omeprazole (PriLOSEC) 20 mg delayed release capsule Take 20 mg by mouth daily     • topiramate (Topamax) 50 MG tablet Take 1 tablet (50 mg total) by mouth daily 90 tablet 3     No current facility-administered medications for this visit. No Known Allergies    Review of Systems    Video Exam    There were no vitals filed for this visit. Physical Exam     Behavioral Health Psychotherapy Progress Note    Psychotherapy Provided: Individual Psychotherapy     1. KAMI (generalized anxiety disorder)            Goals addressed in session: Goal 1     DATA: In this session Darcy voiced that overall things have been good, however she and her mother are still estranged from her brother and sister in law. Reggie Grier voiced that she's come to accept this is how this may be acknowledging that it is not something she can control. She did express being very stressed at work and trying to seek employment elsewhere within her company or even looking outside of her company if she cannot change departments soon.   She voiced being anxious, dreading work, and being on edge when she's there. Clinician validated and supported WILLIAM HILARIO and assisted her in processing through her thoughts about this and what she could do different, encouraging her to be positive and continue to seek employment in other departments or elsewhere so she feels as if she has light at the end of the tunnel. WILLIAM HILARIO was receptive and expressed she's been doing a lot of self-care and still enjoys her part time job and looks forward to that. [facility] continues to demonstrate good insight and judgement with strong emotional intelligence. During this session, this clinician used the following therapeutic modalities: Client-centered Therapy, Motivational Interviewing, Solution-Focused Therapy and Supportive Psychotherapy    Substance Abuse was not addressed during this session. If the client is diagnosed with a co-occurring substance use disorder, please indicate any changes in the frequency or amount of use: n/a. Stage of change for addressing substance use diagnoses: No substance use/Not applicable    ASSESSMENT:  Deisy Quintanilla presents with a Euthymic/ normal mood. her affect is Normal range and intensity, which is congruent, with her mood and the content of the session. The client has made progress on their goals. In interviewing Deisy Quintanilla presents with a none risk of suicide, none risk of self-harm, and none risk of harm to others. For any risk assessment that surpasses a "low" rating, a safety plan must be developed. A safety plan was indicated: no  If yes, describe in detail n/a    PLAN: Between sessions, Deisy Quintanilla will continue to be solution focused, use positive coping skills and self-care to manage anxiety. At the next session, the therapist will use Client-centered Therapy, Motivational Interviewing, Solution-Focused Therapy and Supportive Psychotherapy to address anxiety and occupational stress.     Behavioral Health Treatment Plan and Discharge Planning: Deisy Quintanilla is aware of and agrees to continue to work on their treatment plan. They have identified and are working toward their discharge goals.  yes    Visit start and stop times:    08/30/23  Start Time: 0673  Stop Time: 1648  Total Visit Time: 46 minutes

## 2023-08-31 NOTE — BH TREATMENT PLAN
[address] [facility]  1980     Date of Initial Psychotherapy Assessment: 2-3-23  Date of Current Treatment Plan: 8-30-23  Treatment Plan Target Date: 2-28-24  Treatment Plan Expiration Date: 2-28-24    Diagnosis:   1. KAMI (generalized anxiety disorder)            Area(s) of Need: Laura Wright has remained compliant with outpatient therapy, using the space to process thoughts and feelings regarding events in her life to include familiar stress, occupational stress, as well as improvements and positive events such as feeling less anxious that she had prior to beginning in therapy and finding enjoyment in her part time job. Darcy demonstrates strong emotional intelligence, good insight and judgement, she is independent and motivated and wishes to remain in outpatient therapy for maintenance relative to anxiety and life stress. Long Term Goal 1 (in the client's own words): “Feeling better. Feel more like myself."    Stage of Change: Maintenance    Target Date for completion: 2-28-24     Anticipated therapeutic modalities: Client Centered Therapy, Supportive Therapy, Solution Focused and Motivational Interviewing. People identified to complete this goal: Darcy Antoine     Objective 1:  Laura Wright will meet with therapist once a month or as needed to process her thoughts and emotions regarding life stress and ensure maintenance of coping skills, relaxation techniques, and supports to ensure ongoing reduction in anxiety. My [facility]'s psychiatric provider is: Roya Barrios LCSW    I am currently taking psychiatric medications: No    I feel that I will be ready for discharge from mental health care when I reach the following (measurable goal/objective): consistent and marked reduction in anxiety and stress. For children and adults who have a legal guardian:   Has there been any change to custody orders and/or guardianship status? NA.  If yes, attach updated

## 2023-09-13 ENCOUNTER — DOCUMENTATION (OUTPATIENT)
Dept: PSYCHIATRY | Facility: CLINIC | Age: 43
End: 2023-09-13

## 2023-09-13 NOTE — PROGRESS NOTES
[address] [place] [address]    Patient Name Ira Porter     Date of Birth: 44 y.o. 1980      MRN: [id number]    Admission Date: 1-25-23    Date of Transfer: September 13, 2023    Admission Diagnosis:     1. Generalized Anxiety Disorder    Current Diagnosis:     Generalized Anxiety    Reason for Admission: Rosalie Harris presented for treatment due to worsening anxiety. Primary complaints included ANXIETY SYMPTOMS: daily anxiety symptoms, feeling nervous, worrying about everyday issues. Rosalie Harris was initially seeking therapy as she reported "I really haven't been feeling myself" and described feeling very anxious and nervous for what seemed to be no good reason. She described that she was planning a short vacation to stay with a friend in [place] and right before the trip she canceled her plans for increased anxiety saying she was anxious to be away for too long. When asked what her fear was about being away too long, she reported she felt it were silly things like she didn't want to leave her mom (she lives with her mom), she didn't want to not be able to sleep in her own bed, etc.  She acknowledged the anxiety was irrational but wasn't able to control it. Rosalie Harris also voiced concerns about being single, still living at home, not really having any future goals for herself, and was struggling to meet people and friends as everyone is  with families. At the time she was using negative coping skills such as eating, crying a lot, and sleeping a lot. Progress in Treatment: Darcy was seen for Individual Couseling. During the course of treatment she has done quite well. She and clinician worked on challenging anxiety, stopping and thinking what she is anxious about, thinking more rationally, and taking small steps to overcome her anxiety. Present day, Rosalie Harris denies that she feels overly anxious about anything at this time.   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 With this job she has been able to increase her social interactions. She is comfortable doing things alone or by herself and has also begun traveling again. Adam Harris does still appreciate therapy to express stressors at hand, primarily work stressors and family stressors. But, in essence she has done quite well. Adam Harris is usually seen Friday afternoons when she works from home. Treatment plan is current and expires 2-4-24. Episodes of Higher Level of Care: No    Transfer request Initiated by: Psychiatrist: None Therapist: Mesha Bowden LCSW    Reason for Transfer Request: clinician leaving practice    Does this individual need a clinician with specialized training/expertise?: No    Is this client working with any other [facility] Providers/Therapists?  Psychiatrist: None Therapist: None    Other pertinent issues: None    Are there any specific individuals who would be a “best fit” or who have already agreed to accept this transfer request?      Psychiatrist: None   Therapist: None  Rationale: Not Applicable    Attempts to maintain the current therapeutic relationship: Not Applicable    Transfer request routed to Clinical Coordinator for input and/or approval.     Comments from other involved providers and/or clinical coordinator: None    Mesha Bowden LCSW09/13/23

## 2023-09-20 ENCOUNTER — TELEPHONE (OUTPATIENT)
Dept: PSYCHIATRY | Facility: CLINIC | Age: 43
End: 2023-09-20

## 2023-09-20 NOTE — TELEPHONE ENCOUNTER
Reached out to pt to offer [facility] therapy anywhere appt from [facility]. LVM for pt to contact intake dept.

## 2023-09-29 ENCOUNTER — TELEMEDICINE (OUTPATIENT)
Dept: PSYCHIATRY | Facility: CLINIC | Age: 43
End: 2023-09-29
Payer: COMMERCIAL

## 2023-09-29 DIAGNOSIS — F41.1 GAD (GENERALIZED ANXIETY DISORDER): Primary | ICD-10-CM

## 2023-09-29 PROCEDURE — 90834 PSYTX W PT 45 MINUTES: CPT

## 2023-09-29 NOTE — PSYCH
Virtual Regular Visit    Verification of patient location:    Patient is located at Home in the following state in which I hold an active license [place]      Assessment/Plan:    Problem List Items Addressed This Visit        Other    KAMI (generalized anxiety disorder) - Primary       Goals addressed in session: Goal 1      Darcy continues to be compliant with therapy to process her thoughts and feelings as well as exploring solutions and coping skills regarding life stress. Reason for visit is No chief complaint on file. Encounter provider Ester Coffman LCSW    Provider located at [address] [place] [id number]  [phone]      Recent Visits  No visits were found meeting these conditions. Showing recent visits within past 7 days and meeting all other requirements  Today's Visits  Date Type Provider Dept   09/29/23 [address] [facility]   Showing today's visits and meeting all other requirements  Future Appointments  No visits were found meeting these conditions. Showing future appointments within next 150 days and meeting all other requirements       The patient was identified by name and date of birth. Janine Quevedo was informed that this is a telemedicine visit and that the visit is being conducted throughthe [employer] platform. She agrees to proceed. .  My office door was closed. No one else was in the room. She acknowledged consent and understanding of privacy and security of the video platform. The patient has agreed to participate and understands they can discontinue the visit at any time. Patient is aware this is a billable service. William Pimentel is a 44 y.o. female       [facility] was calm and cooperative with good insight and judgement. Thinking was linear and goal oriented.      Past Medical History:   Diagnosis Date   • Abnormal Pap smear of cervix     H/o CINI   • STEFANIA I (cervical intraepithelial neoplasia I)    • GERD (gastroesophageal reflux disease) 10/23/2020   • Migraine    • Papanicolaou smear 03/09/2018    neg   • Radius fracture     Left       Past Surgical History:   Procedure Laterality Date   • COLPOSCOPY     • GYNECOLOGIC CRYOSURGERY  09/2008    STEFANIA I   • TOOTH EXTRACTION N/A 09/01/2018       Current Outpatient Medications   Medication Sig Dispense Refill   • aspirin 81 mg chewable tablet Chew 81 mg daily as needed     • loratadine (CLARITIN) 10 mg tablet Take 1 tablet by mouth daily     • meloxicam (MOBIC) 15 mg tablet Take 1 tablet (15 mg total) by mouth daily as needed for moderate pain 90 tablet 0   • norgestimate-ethinyl estradiol (Tri-Lo-Sprintec) 0.18/0.215/0.25 MG-25 MCG per tablet Take 1 tablet by mouth daily 84 tablet 4   • omeprazole (PriLOSEC) 20 mg delayed release capsule Take 20 mg by mouth daily     • topiramate (Topamax) 50 MG tablet Take 1 tablet (50 mg total) by mouth daily 90 tablet 3     No current facility-administered medications for this visit. No Known Allergies    Review of Systems    Video Exam    There were no vitals filed for this visit. Physical Exam     Behavioral Health Psychotherapy Progress Note    Psychotherapy Provided: Individual Psychotherapy     1. KAMI (generalized anxiety disorder)            Goals addressed in session: Goal 1     DATA: During this session Darcy voiced many frustrations and new stressors in her life. She spoke about family issues, work complications, as well as plans to travel. Clinician validated and supported Darcy and engaged Blair in a conversation about coping skills so that she can remain stable with minimal anxiety. Blair was receptive and described that she has shifted her thinking with improved perspectives to include not over worrying about things that are not within her control.   She talked about working toward ensuring that she is organized, planning ahead, and reminding herself that she can only control herself and can't worry about how other people behave (this was primarily within regards to family conflict). Clinician agreed and highlighted Darcy's progress over the past few months, and encouraged her to continue thinking in this manner as a means to reduce anxiety and move forward with personal and professional goals. During this session, this clinician used the following therapeutic modalities: Client-centered Therapy, Motivational Interviewing, Solution-Focused Therapy and Supportive Psychotherapy    Substance Abuse was not addressed during this session. If the client is diagnosed with a co-occurring substance use disorder, please indicate any changes in the frequency or amount of use: n/a. Stage of change for addressing substance use diagnoses: No substance use/Not applicable    ASSESSMENT:  Niranjan Pond presents with a Euthymic/ normal mood. her affect is Normal range and intensity, which is congruent, with her mood and the content of the session. The client has made progress on their goals. In interviewing Niranjan Pond presents with a none risk of suicide, none risk of self-harm, and none risk of harm to others. For any risk assessment that surpasses a "low" rating, a safety plan must be developed. A safety plan was indicated: no  If yes, describe in detail n/a    PLAN: Between sessions, Niranjan Pond will continue to think with positive perspectives, remain goal oriented, and solutions focused and not internalizing or absorbing stress or conflict from others. At the next session, the therapist will use Client-centered Therapy, Motivational Interviewing, Solution-Focused Therapy and Supportive Psychotherapy to address anxiety and life stress. Behavioral Health Treatment Plan and Discharge Planning: Niranjan Pond is aware of and agrees to continue to work on their treatment plan.  They have identified and are working toward their discharge goals.  yes    Visit start and stop times:    09/29/23  Start Time: 1500  Stop Time: 1546  Total Visit Time: 46 minutes

## 2023-10-27 ENCOUNTER — TELEMEDICINE (OUTPATIENT)
Dept: PSYCHIATRY | Facility: CLINIC | Age: 43
End: 2023-10-27
Payer: COMMERCIAL

## 2023-10-27 DIAGNOSIS — F41.1 GAD (GENERALIZED ANXIETY DISORDER): Primary | ICD-10-CM

## 2023-10-27 PROCEDURE — 90834 PSYTX W PT 45 MINUTES: CPT

## 2023-10-27 NOTE — PSYCH
Virtual Regular Visit    Verification of patient location:    Patient is located at Home in the following state in which I hold an active license [place]      Assessment/Plan:    Problem List Items Addressed This Visit          Other    KAMI (generalized anxiety disorder) - Primary       Goals addressed in session: Goal 1      Darcy continues to be complaint with outpatient therapy processing thoughts and emotions to alleviate anxiety and stress. Reason for visit is   Chief Complaint   Patient presents with    Virtual Regular Visit          Encounter provider Angelica Mcintyre LCSW    Provider located at [address] [place] [id number] [phone]      Recent Visits  No visits were found meeting these conditions. Showing recent visits within past 7 days and meeting all other requirements  Today's Visits  Date Type Provider Dept   10/27/23 [facility]   Showing today's visits and meeting all other requirements  Future Appointments  No visits were found meeting these conditions. Showing future appointments within next 150 days and meeting all other requirements       The patient was identified by name and date of birth. Tayler Zelaya was informed that this is a telemedicine visit and that the visit is being conducted throughthe [employer] mobile platform. She agrees to proceed. .  My office door was closed. No one else was in the room. She acknowledged consent and understanding of privacy and security of the video platform. The patient has agreed to participate and understands they can discontinue the visit at any time. Patient is aware this is a billable service. Marbella Grimm is a 44 y.o. female       HPI Henrik Presser was calm and cooperative with good insight and judgement. Thinking was linear and goal oriented.      Past Medical History:   Diagnosis Date Abnormal Pap smear of cervix     H/o CINI    STEFANIA I (cervical intraepithelial neoplasia I)     GERD (gastroesophageal reflux disease) 10/23/2020    Migraine     Papanicolaou smear 03/09/2018    neg    Radius fracture     Left       Past Surgical History:   Procedure Laterality Date    COLPOSCOPY      GYNECOLOGIC CRYOSURGERY  09/2008    STEFANIA I    TOOTH EXTRACTION N/A 09/01/2018       Current Outpatient Medications   Medication Sig Dispense Refill    aspirin 81 mg chewable tablet Chew 81 mg daily as needed      loratadine (CLARITIN) 10 mg tablet Take 1 tablet by mouth daily      meloxicam (MOBIC) 15 mg tablet Take 1 tablet (15 mg total) by mouth daily as needed for moderate pain 90 tablet 0    norgestimate-ethinyl estradiol (Tri-Lo-Sprintec) 0.18/0.215/0.25 MG-25 MCG per tablet Take 1 tablet by mouth daily 84 tablet 4    omeprazole (PriLOSEC) 20 mg delayed release capsule Take 20 mg by mouth daily      topiramate (Topamax) 50 MG tablet Take 1 tablet (50 mg total) by mouth daily 90 tablet 3     No current facility-administered medications for this visit. No Known Allergies    Review of Systems    Video Exam    There were no vitals filed for this visit. Physical Exam     Behavioral Health Psychotherapy Progress Note    Psychotherapy Provided: Individual Psychotherapy     1. KAMI (generalized anxiety disorder)            Goals addressed in session: Goal 1     DATA: This is Darcy's last session with this clinician and she has been TRINITY to her new clinician and scheduled for November. In session clinician highlighted Darcy's progress, how her anxiety has decreased, and that she continues to do well in expressing herself to include frustrations, family conflict, work related stress, and keeping active and engaged. Jayla Blancas agreed that she has made progress and enjoys therapy as a space to be solution focused, process issues at hand, and problem solve when feeling overwhelmed.   Jayla Blancas continued to talk about her family and ongoing family conflicts, a few things that she's looking forward to, as well as her plans at her job and trying to alleviate work related stress. Alayna Mohr remains proactive with good self-awareness, strong emotional intelligence, and good insight and judgement. During this session, this clinician used the following therapeutic modalities: Client-centered Therapy, Motivational Interviewing, Solution-Focused Therapy, and Supportive Psychotherapy    Substance Abuse was not addressed during this session. If the client is diagnosed with a co-occurring substance use disorder, please indicate any changes in the frequency or amount of use: n/a. Stage of change for addressing substance use diagnoses: No substance use/Not applicable    ASSESSMENT:  Zhou Tolentino presents with a Euthymic/ normal mood. her affect is Normal range and intensity, which is congruent, with her mood and the content of the session. The client has made progress on their goals. In interviewing Zhou Tolentino presents with a none risk of suicide, none risk of self-harm, and none risk of harm to others. For any risk assessment that surpasses a "low" rating, a safety plan must be developed. A safety plan was indicated: no  If yes, describe in detail n/a    PLAN: Between sessions, Zhou Tolentino will continue to engage in therapy on a monthly basic to keep anxiety reduced. At the next session, the therapist will use Client-centered Therapy, Motivational Interviewing, Solution-Focused Therapy, and Supportive Psychotherapy to address stress and anxiety. Behavioral Health Treatment Plan and Discharge Planning: Zhou Tolentino is aware of and agrees to continue to work on their treatment plan. They have identified and are working toward their discharge goals.  yes    Visit start and stop times:    10/27/23  Start Time: 1500  Stop Time: 1523  Total Visit Time: 48 minutes

## 2023-10-30 ENCOUNTER — HOSPITAL ENCOUNTER (OUTPATIENT)
Dept: MAMMOGRAPHY | Facility: CLINIC | Age: 43
Discharge: HOME | End: 2023-10-30
Payer: COMMERCIAL

## 2023-10-30 VITALS — HEIGHT: 59 IN | WEIGHT: 183 LBS | BODY MASS INDEX: 36.89 KG/M2

## 2023-10-30 DIAGNOSIS — Z12.31 ENCOUNTER FOR SCREENING MAMMOGRAM FOR BREAST CANCER: ICD-10-CM

## 2023-10-30 PROCEDURE — 77063 BREAST TOMOSYNTHESIS BI: CPT

## 2023-10-30 PROCEDURE — 77067 SCR MAMMO BI INCL CAD: CPT

## 2023-10-31 ENCOUNTER — DOCUMENTATION (OUTPATIENT)
Dept: PSYCHIATRY | Facility: CLINIC | Age: 43
End: 2023-10-31

## 2023-10-31 NOTE — PROGRESS NOTES
Psychotherapy Discharge Summary    Preferred Name: Shon Montemayor  YOB: 1980    Admission date to psychotherapy: 1-25-23    Referred by: self    Presenting Problem: Anxiety    Course of treatment included : individual therapy     Progress/Outcome of Treatment Goals (brief summary of course of treatment) Margarita Tobar has made improvements at keeping anxious feelings reduced by employing positive self-talk, being motivated, being engaged with friends and family, as well as being solution focused and ambitious. She has been compliant with therapy and demonstrates self-awareness, good insight and judgement, as well as strong emotional intelligence. Treatment Complications (if any): none    Treatment Progress: good    Current [facility] Provider: Rachelle Farris    Discharge Medications include: none    Discharge Date: 10-31-23    Discharge Diagnosis:  Anxiety    Criteria for Discharge:  TRINITY due to clinician leaving the practice    Aftercare recommendations include (include specific referral names and phone numbers, if appropriate): none    Prognosis: good

## 2023-11-17 ENCOUNTER — TELEMEDICINE (OUTPATIENT)
Dept: PSYCHIATRY | Facility: CLINIC | Age: 43
End: 2023-11-17
Payer: COMMERCIAL

## 2023-11-17 DIAGNOSIS — F41.1 GAD (GENERALIZED ANXIETY DISORDER): Primary | ICD-10-CM

## 2023-11-17 PROCEDURE — 90837 PSYTX W PT 60 MINUTES: CPT | Performed by: STUDENT IN AN ORGANIZED HEALTH CARE EDUCATION/TRAINING PROGRAM

## 2023-11-17 NOTE — PSYCH
Virtual Regular Visit    Verification of patient location:    Patient is located at Home in the following state in which I hold an active license [place]      Assessment/Plan:    Problem List Items Addressed This Visit          Other    KAMI (generalized anxiety disorder) - Primary       Goals addressed in session: Goal 1          Reason for visit is No chief complaint on file. Encounter provider Americo Joyce LCSW    Provider located at [address] [place] [id number] [phone]      Recent Visits  Date Type Provider Dept   11/17/23 Telemedicine Carlos Farris LCSW [facility]   Showing recent visits within past 7 days and meeting all other requirements  Future Appointments  No visits were found meeting these conditions. Showing future appointments within next 150 days and meeting all other requirements       The patient was identified by name and date of birth. Niranjan Pond was informed that this is a telemedicine visit and that the visit is being conducted throughthe [employer]. She agrees to proceed. .  My office door was closed. No one else was in the room. She acknowledged consent and understanding of privacy and security of the video platform. The patient has agreed to participate and understands they can discontinue the visit at any time. Patient is aware this is a billable service. Arie Escobedo is a 44 y.o. female  .       HPI     Past Medical History:   Diagnosis Date    Abnormal Pap smear of cervix     H/o CINI    STEFANIA I (cervical intraepithelial neoplasia I)     GERD (gastroesophageal reflux disease) 10/23/2020    Migraine     Papanicolaou smear 03/09/2018    neg    Radius fracture     Left       Past Surgical History:   Procedure Laterality Date    COLPOSCOPY      GYNECOLOGIC CRYOSURGERY  09/2008    STEFANIA I    TOOTH EXTRACTION N/A 09/01/2018       Current Outpatient Medications   Medication Sig Dispense Refill    aspirin 81 mg chewable tablet Chew 81 mg daily as needed      loratadine (CLARITIN) 10 mg tablet Take 1 tablet by mouth daily      meloxicam (MOBIC) 15 mg tablet Take 1 tablet (15 mg total) by mouth daily as needed for moderate pain 90 tablet 0    norgestimate-ethinyl estradiol (Tri-Lo-Sprintec) 0.18/0.215/0.25 MG-25 MCG per tablet Take 1 tablet by mouth daily 84 tablet 4    omeprazole (PriLOSEC) 20 mg delayed release capsule Take 20 mg by mouth daily      topiramate (Topamax) 50 MG tablet Take 1 tablet (50 mg total) by mouth daily 90 tablet 3     No current facility-administered medications for this visit. No Known Allergies    Review of Systems    Video Exam    There were no vitals filed for this visit. Physical Exam     Behavioral Health Psychotherapy Progress Note    Psychotherapy Provided: Individual Psychotherapy     1. KAMI (generalized anxiety disorder)            Goals addressed in session: Goal 1     DATA: Andreia processed her thoughts, feelings, and behavior about her job and current situation. She  higher levels of stress due to her job, but she is able to implement positive coping skills. This session was used to build rapport. During this session, this clinician used the following therapeutic modalities: Client-centered Therapy    Substance Abuse was not addressed during this session. If the client is diagnosed with a co-occurring substance use disorder, please indicate any changes in the frequency or amount of use: n/a. Stage of change for addressing substance use diagnoses: No substance use/Not applicable    ASSESSMENT:  Tayler Zelaya presents with a Euthymic/ normal mood. her affect is Normal range and intensity, which is congruent, with her mood and the content of the session. The client has made progress on their goals.      Tayler Zelaya presents with a none risk of suicide, none risk of self-harm, and none risk of harm to others. For any risk assessment that surpasses a "low" rating, a safety plan must be developed. A safety plan was indicated: no  If yes, describe in detail n/a    PLAN: Between sessions, Rosaiker Chavarria will continue to implement positive coping skills. At the next session, the therapist will use Client-centered Therapy and Cognitive Behavioral Therapy to address anxiety. Behavioral Health Treatment Plan and Discharge Planning: Rosaiker Lopezn is aware of and agrees to continue to work on their treatment plan. They have identified and are working toward their discharge goals.  no    Visit start and stop times:    11/17/23  Start Time: 1130  Stop Time: 1228  Total Visit Time: 58 minutes

## 2023-12-13 DIAGNOSIS — Z00.6 ENCOUNTER FOR EXAMINATION FOR NORMAL COMPARISON OR CONTROL IN CLINICAL RESEARCH PROGRAM: ICD-10-CM

## 2023-12-15 ENCOUNTER — TELEMEDICINE (OUTPATIENT)
Dept: PSYCHIATRY | Facility: CLINIC | Age: 43
End: 2023-12-15
Payer: COMMERCIAL

## 2023-12-15 DIAGNOSIS — F41.1 GAD (GENERALIZED ANXIETY DISORDER): Primary | ICD-10-CM

## 2023-12-15 PROCEDURE — 90834 PSYTX W PT 45 MINUTES: CPT | Performed by: STUDENT IN AN ORGANIZED HEALTH CARE EDUCATION/TRAINING PROGRAM

## 2023-12-15 NOTE — PSYCH
Virtual Regular Visit    Verification of patient location:    Patient is located at Home in the following state in which I hold an active license [place]      Assessment/Plan:    Problem List Items Addressed This Visit          Other    KAMI (generalized anxiety disorder) - Primary       Goals addressed in session: Goal 1          Reason for visit is No chief complaint on file.       Encounter provider Kaleigh Farris LCSW    [facility]  [place] [address] [phone]      Recent Visits  Date Type Provider Dept   12/15/23 Telemedicine Kaleigh Farris LCSW [facility]   Showing recent visits within past 7 days and meeting all other requirements  Future Appointments  No visits were found meeting these conditions.  Showing future appointments within next 150 days and meeting all other requirements       The patient was identified by name and date of birth. Darcy Antoine was informed that this is a telemedicine visit and that the visit is being conducted throughthe Epic Embedded platform. She agrees to proceed..  My office door was closed. No one else was in the room.  She acknowledged consent and understanding of privacy and security of the video platform. The patient has agreed to participate and understands they can discontinue the visit at any time.    Patient is aware this is a billable service.     Subjective  Darcy Antoine is a 43 y.o. female.      HPI     Past Medical History:   Diagnosis Date    Abnormal Pap smear of cervix     H/o CINI    STEFANIA I (cervical intraepithelial neoplasia I)     GERD (gastroesophageal reflux disease) 10/23/2020    Migraine     Papanicolaou smear 03/09/2018    neg    Radius fracture     Left       Past Surgical History:   Procedure Laterality Date    COLPOSCOPY      GYNECOLOGIC CRYOSURGERY  09/2008    STEFANIA I    TOOTH EXTRACTION N/A  09/01/2018       Current Outpatient Medications   Medication Sig Dispense Refill    aspirin 81 mg chewable tablet Chew 81 mg daily as needed      loratadine (CLARITIN) 10 mg tablet Take 1 tablet by mouth daily      meloxicam (MOBIC) 15 mg tablet Take 1 tablet (15 mg total) by mouth daily as needed for moderate pain 90 tablet 0    norgestimate-ethinyl estradiol (Tri-Lo-Sprintec) 0.18/0.215/0.25 MG-25 MCG per tablet Take 1 tablet by mouth daily 84 tablet 4    omeprazole (PriLOSEC) 20 mg delayed release capsule Take 20 mg by mouth daily      topiramate (Topamax) 50 MG tablet Take 1 tablet (50 mg total) by mouth daily 90 tablet 3     No current facility-administered medications for this visit.        No Known Allergies    Review of Systems    Video Exam    There were no vitals filed for this visit.    Physical Exam     Behavioral Health Psychotherapy Progress Note    Psychotherapy Provided: Individual Psychotherapy     1. KAMI (generalized anxiety disorder)            Goals addressed in session: Goal 1     DATA: Darcy processed her feelings, and thoughts about her job. She reported not wanting to work in the same place any longer as her manager seams to be targeting her, and making her job harder. Darcy is applying for other positions and limited success. She is looking forward for her vacation to the [facility]. She practiced some MBSR strategies to stay present and calm during this time.  During this session, this clinician used the following therapeutic modalities: Client-centered Therapy and Mindfulness-based Strategies    Substance Abuse was not addressed during this session. If the client is diagnosed with a co-occurring substance use disorder, please indicate any changes in the frequency or amount of use: n/a. Stage of change for addressing substance use diagnoses: No substance use/Not applicable    ASSESSMENT:  Darcy Antoine presents with a Angry and Anxious mood.     her affect is Normal range and intensity, which is  "congruent, with her mood and the content of the session. The client has made progress on their goals.     Darcy Antoine presents with a none risk of suicide, none risk of self-harm, and none risk of harm to others.    For any risk assessment that surpasses a \"low\" rating, a safety plan must be developed.    A safety plan was indicated: no  If yes, describe in detail n/a    PLAN: Between sessions, Darcy Antoine will implement CBT strategies. At the next session, the therapist will use Client-centered Therapy, Cognitive Behavioral Therapy, and Mindfulness-based Strategies to address anxIETY.    Behavioral Health Treatment Plan and Discharge Planning: Darcy Antoine is aware of and agrees to continue to work on their treatment plan. They have identified and are working toward their discharge goals. no    Visit start and stop times:    12/15/23  Start Time: 1230  Stop Time: 1312  Total Visit Time: 42 minutes      "

## 2024-01-22 ENCOUNTER — TELEMEDICINE (OUTPATIENT)
Dept: PSYCHIATRY | Facility: CLINIC | Age: 44
End: 2024-01-22
Payer: COMMERCIAL

## 2024-01-22 DIAGNOSIS — F41.1 GAD (GENERALIZED ANXIETY DISORDER): Primary | ICD-10-CM

## 2024-01-22 PROCEDURE — 90834 PSYTX W PT 45 MINUTES: CPT | Performed by: STUDENT IN AN ORGANIZED HEALTH CARE EDUCATION/TRAINING PROGRAM

## 2024-01-26 NOTE — PSYCH
Virtual Regular Visit    Verification of patient location:    Patient is located at Home in the following state in which I hold an active license [place]      Assessment/Plan:    Problem List Items Addressed This Visit          Other    KAMI (generalized anxiety disorder) - Primary       Goals addressed in session: Goal 1          Reason for visit is No chief complaint on file.       Encounter provider Kaleigh Farris LCSW    [facility]  [place] [address] [phone]      Recent Visits  Date Type Provider Dept   01/22/24 Telemedicine Kaleigh Farris LCSW [facility]   Showing recent visits within past 7 days and meeting all other requirements  Future Appointments  No visits were found meeting these conditions.  Showing future appointments within next 150 days and meeting all other requirements       The patient was identified by name and date of birth. Darcy Antoine was informed that this is a telemedicine visit and that the visit is being conducted throughthe Epic Embedded platform. She agrees to proceed..  My office door was closed. No one else was in the room.  She acknowledged consent and understanding of privacy and security of the video platform. The patient has agreed to participate and understands they can discontinue the visit at any time.    Patient is aware this is a billable service.     Subjective  Darcy Antoine is a 43 y.o. female  .      HPI     Past Medical History:   Diagnosis Date    Abnormal Pap smear of cervix     H/o CINI    STEFANIA I (cervical intraepithelial neoplasia I)     GERD (gastroesophageal reflux disease) 10/23/2020    Migraine     Papanicolaou smear 03/09/2018    neg    Radius fracture     Left       Past Surgical History:   Procedure Laterality Date    COLPOSCOPY      GYNECOLOGIC CRYOSURGERY  09/2008    STEFANIA I    TOOTH EXTRACTION N/A  09/01/2018       Current Outpatient Medications   Medication Sig Dispense Refill    aspirin 81 mg chewable tablet Chew 81 mg daily as needed      loratadine (CLARITIN) 10 mg tablet Take 1 tablet by mouth daily      meloxicam (MOBIC) 15 mg tablet Take 1 tablet (15 mg total) by mouth daily as needed for moderate pain 90 tablet 0    norgestimate-ethinyl estradiol (Tri-Lo-Sprintec) 0.18/0.215/0.25 MG-25 MCG per tablet Take 1 tablet by mouth daily 84 tablet 4    omeprazole (PriLOSEC) 20 mg delayed release capsule Take 20 mg by mouth daily      topiramate (Topamax) 50 MG tablet Take 1 tablet (50 mg total) by mouth daily 90 tablet 3     No current facility-administered medications for this visit.        No Known Allergies    Review of Systems    Video Exam    There were no vitals filed for this visit.    Physical Exam     Behavioral Health Psychotherapy Progress Note    Psychotherapy Provided: Individual Psychotherapy     1. KAMI (generalized anxiety disorder)            Goals addressed in session: Goal 1     DATA: Darcy processed her feelings of frustration and sadness. She is struggling to cope with her feelings about her job. However, was able to ma some connections about her behavior. Darcy talked about seasonal depression.   During this session, this clinician used the following therapeutic modalities: Client-centered Therapy    Substance Abuse was not addressed during this session. If the client is diagnosed with a co-occurring substance use disorder, please indicate any changes in the frequency or amount of use: n/a. Stage of change for addressing substance use diagnoses: No substance use/Not applicable    ASSESSMENT:  Darcy Antoine presents with a Euthymic/ normal mood.     her affect is Normal range and intensity, which is congruent, with her mood and the content of the session. The client has made progress on their goals.     Darcy Antoine presents with a none risk of suicide, none risk of self-harm, and none risk of  "harm to others.    For any risk assessment that surpasses a \"low\" rating, a safety plan must be developed.    A safety plan was indicated: no  If yes, describe in detail n/a    PLAN: Between sessions, Darcy Antoine will continue to implement positive coping skills. At the next session, the therapist will use Client-centered Therapy to address anxiety.    Behavioral Health Treatment Plan and Discharge Planning: Darcy Antoine is aware of and agrees to continue to work on their treatment plan. They have identified and are working toward their discharge goals. no    Visit start and stop times:    01/22/24  Start Time: 1500  Stop Time: 1552  Total Visit Time: 52 minutes        "

## 2024-02-08 ENCOUNTER — OFFICE VISIT (OUTPATIENT)
Dept: FAMILY MEDICINE CLINIC | Facility: CLINIC | Age: 44
End: 2024-02-08
Payer: COMMERCIAL

## 2024-02-08 VITALS
BODY MASS INDEX: 35.12 KG/M2 | OXYGEN SATURATION: 99 % | TEMPERATURE: 98.3 F | HEART RATE: 88 BPM | SYSTOLIC BLOOD PRESSURE: 120 MMHG | HEIGHT: 59 IN | WEIGHT: 174.2 LBS | DIASTOLIC BLOOD PRESSURE: 72 MMHG

## 2024-02-08 DIAGNOSIS — J02.9 SORE THROAT: Primary | ICD-10-CM

## 2024-02-08 DIAGNOSIS — G43.009 MIGRAINE WITHOUT AURA AND WITHOUT STATUS MIGRAINOSUS, NOT INTRACTABLE: ICD-10-CM

## 2024-02-08 LAB — S PYO AG THROAT QL: NEGATIVE

## 2024-02-08 PROCEDURE — 87880 STREP A ASSAY W/OPTIC: CPT | Performed by: FAMILY MEDICINE

## 2024-02-08 PROCEDURE — 99214 OFFICE O/P EST MOD 30 MIN: CPT | Performed by: FAMILY MEDICINE

## 2024-02-08 RX ORDER — TOPIRAMATE 50 MG/1
50 TABLET, FILM COATED ORAL 2 TIMES DAILY
Qty: 180 TABLET | Refills: 1 | Status: SHIPPED | OUTPATIENT
Start: 2024-02-08

## 2024-02-08 RX ORDER — RIZATRIPTAN BENZOATE 5 MG/1
5 TABLET, ORALLY DISINTEGRATING ORAL ONCE AS NEEDED
Qty: 10 TABLET | Refills: 1 | Status: SHIPPED | OUTPATIENT
Start: 2024-02-08

## 2024-02-08 RX ORDER — AMOXICILLIN 875 MG/1
875 TABLET, COATED ORAL 2 TIMES DAILY
Qty: 20 TABLET | Refills: 0 | Status: SHIPPED | OUTPATIENT
Start: 2024-02-08 | End: 2024-02-18

## 2024-02-08 NOTE — PROGRESS NOTES
Assessment/Plan:    1. Sore throat  -     amoxicillin (AMOXIL) 875 mg tablet; Take 1 tablet (875 mg total) by mouth 2 (two) times a day for 10 days  -     POCT rapid ANTIGEN strepA    2. Migraine without aura and without status migrainosus, not intractable  -     topiramate (Topamax) 50 MG tablet; Take 1 tablet (50 mg total) by mouth 2 (two) times a day  -     rizatriptan (MAXALT-MLT) 5 mg disintegrating tablet; Take 1 tablet (5 mg total) by mouth once as needed for migraine may repeat in 2 hours if necessary        Patient Instructions   Increase the topamax to 50mg bid or at least 25 in AM and 50 in PM  Trial of rizatriptan for breakthrough migraines      Return in about 6 months (around 8/8/2024).    Subjective:      Patient ID: Darcy Antoine is a 43 y.o. female.    Chief Complaint   Patient presents with    Follow-up    Sore Throat       Here for f/u, needs FMLA paperwork completed. Has had S/T the last few days, feels like previous strep. Tender swollen glands. No fever. No signif cough. Has been working at the office more, more work stress. On topamax 50mg daily. Uses meloxicam prn h/a's. Absence of OCP did not reduce the frequency of the migraines.     Sore Throat   Associated symptoms include headaches. Pertinent negatives include no abdominal pain, congestion or shortness of breath.       The following portions of the patient's history were reviewed and updated as appropriate: allergies, current medications, past family history, past medical history, past social history, past surgical history and problem list.    Review of Systems   Constitutional:  Negative for fatigue and fever.   HENT:  Positive for sore throat. Negative for congestion.    Eyes:  Negative for visual disturbance.   Respiratory:  Negative for chest tightness and shortness of breath.    Cardiovascular:  Negative for chest pain and palpitations.   Gastrointestinal:  Negative for abdominal pain.   Genitourinary:  Negative for difficulty  "urinating.   Musculoskeletal:  Negative for arthralgias.   Neurological:  Positive for headaches.   Hematological:  Does not bruise/bleed easily.         Current Outpatient Medications   Medication Sig Dispense Refill    amoxicillin (AMOXIL) 875 mg tablet Take 1 tablet (875 mg total) by mouth 2 (two) times a day for 10 days 20 tablet 0    aspirin 81 mg chewable tablet Chew 81 mg daily as needed      loratadine (CLARITIN) 10 mg tablet Take 1 tablet by mouth daily      meloxicam (MOBIC) 15 mg tablet Take 1 tablet (15 mg total) by mouth daily as needed for moderate pain 90 tablet 0    norgestimate-ethinyl estradiol (Tri-Lo-Sprintec) 0.18/0.215/0.25 MG-25 MCG per tablet Take 1 tablet by mouth daily 84 tablet 4    omeprazole (PriLOSEC) 20 mg delayed release capsule Take 20 mg by mouth daily      rizatriptan (MAXALT-MLT) 5 mg disintegrating tablet Take 1 tablet (5 mg total) by mouth once as needed for migraine may repeat in 2 hours if necessary 10 tablet 1    topiramate (Topamax) 50 MG tablet Take 1 tablet (50 mg total) by mouth 2 (two) times a day 180 tablet 1     No current facility-administered medications for this visit.       Objective:    /72 (BP Location: Right arm, Patient Position: Sitting, Cuff Size: Large)   Pulse 88   Temp 98.3 °F (36.8 °C) (Temporal)   Ht 4' 11\" (1.499 m)   Wt 79 kg (174 lb 3.2 oz)   LMP 02/08/2024   SpO2 99%   BMI 35.18 kg/m²        Physical Exam  Vitals reviewed.   Constitutional:       Appearance: Normal appearance. She is well-developed.   HENT:      Right Ear: Tympanic membrane and ear canal normal.      Left Ear: Tympanic membrane and ear canal normal.      Mouth/Throat:      Mouth: Mucous membranes are moist.      Pharynx: Uvula midline. Pharyngeal swelling and posterior oropharyngeal erythema present. No oropharyngeal exudate or uvula swelling.      Tonsils: No tonsillar exudate. 2+ on the right. 2+ on the left.   Cardiovascular:      Rate and Rhythm: Normal rate and " regular rhythm.      Heart sounds: Normal heart sounds.   Pulmonary:      Effort: Pulmonary effort is normal.      Breath sounds: Normal breath sounds.   Skin:     General: Skin is warm.   Neurological:      General: No focal deficit present.      Mental Status: She is alert and oriented to person, place, and time.   Psychiatric:         Mood and Affect: Mood normal.         Behavior: Behavior normal.         Thought Content: Thought content normal.         Judgment: Judgment normal.                Henrietta Lal MD

## 2024-02-08 NOTE — PATIENT INSTRUCTIONS
Increase the topamax to 50mg bid or at least 25 in AM and 50 in PM  Trial of rizatriptan for breakthrough migraines

## 2024-03-01 ENCOUNTER — TELEMEDICINE (OUTPATIENT)
Dept: PSYCHIATRY | Facility: CLINIC | Age: 44
End: 2024-03-01
Payer: COMMERCIAL

## 2024-03-01 DIAGNOSIS — F41.1 GAD (GENERALIZED ANXIETY DISORDER): Primary | ICD-10-CM

## 2024-03-01 PROCEDURE — 90834 PSYTX W PT 45 MINUTES: CPT | Performed by: STUDENT IN AN ORGANIZED HEALTH CARE EDUCATION/TRAINING PROGRAM

## 2024-03-01 NOTE — PSYCH
Virtual Regular Visit    Verification of patient location:    Patient is located at Home in the following state in which I hold an active license [place]      Assessment/Plan:    Problem List Items Addressed This Visit       KAMI (generalized anxiety disorder) - Primary       Goals addressed in session: Goal 1          Reason for visit is No chief complaint on file.       Encounter provider Kaleigh Farris LCSW    [facility]  [place] [address] [phone]      Recent Visits  Date Type Provider Dept   03/01/24 Telemedicine Kaleigh Farris LCSW [facility]   Showing recent visits within past 7 days and meeting all other requirements  Future Appointments  No visits were found meeting these conditions.  Showing future appointments within next 150 days and meeting all other requirements       The patient was identified by name and date of birth. Darcy Antoine was informed that this is a telemedicine visit and that the visit is being conducted throughthe Epic Embedded platform. She agrees to proceed..  My office door was closed. No one else was in the room.  She acknowledged consent and understanding of privacy and security of the video platform. The patient has agreed to participate and understands they can discontinue the visit at any time.    Patient is aware this is a billable service.     Subjective  Darcy Antoine is a 43 y.o. female  .      HPI     Past Medical History:   Diagnosis Date    Abnormal Pap smear of cervix     H/o CINI    Allergies 10/23/2020    STEFANIA I (cervical intraepithelial neoplasia I)     GERD (gastroesophageal reflux disease) 10/23/2020    Migraine     Papanicolaou smear 03/09/2018    neg    Radius fracture     Left       Past Surgical History:   Procedure Laterality Date    COLPOSCOPY      GYNECOLOGIC CRYOSURGERY  09/2008    STEFANIA I    TOOTH  EXTRACTION N/A 09/01/2018       Current Outpatient Medications   Medication Sig Dispense Refill    aspirin 81 mg chewable tablet Chew 81 mg daily as needed      loratadine (CLARITIN) 10 mg tablet Take 1 tablet by mouth daily      meloxicam (MOBIC) 15 mg tablet Take 1 tablet (15 mg total) by mouth daily as needed for moderate pain 90 tablet 0    norgestimate-ethinyl estradiol (Tri-Lo-Sprintec) 0.18/0.215/0.25 MG-25 MCG per tablet Take 1 tablet by mouth daily 84 tablet 4    omeprazole (PriLOSEC) 20 mg delayed release capsule Take 20 mg by mouth daily      rizatriptan (MAXALT-MLT) 5 mg disintegrating tablet Take 1 tablet (5 mg total) by mouth once as needed for migraine may repeat in 2 hours if necessary 10 tablet 1    topiramate (Topamax) 50 MG tablet Take 1 tablet (50 mg total) by mouth 2 (two) times a day 180 tablet 1     No current facility-administered medications for this visit.        No Known Allergies    Review of Systems    Video Exam    There were no vitals filed for this visit.    Physical Exam     Behavioral Health Psychotherapy Progress Note    Psychotherapy Provided: Individual Psychotherapy     1. KAMI (generalized anxiety disorder)            Goals addressed in session: Goal 1     DATA: Treatment plan completed. Darcy processed her feelings of frustration about her job. She learned and practice mindfulness strategies to transition to home and to leave her job behind.   During this session, this clinician used the following therapeutic modalities: Client-centered Therapy and Mindfulness-based Strategies    Substance Abuse was not addressed during this session. If the client is diagnosed with a co-occurring substance use disorder, please indicate any changes in the frequency or amount of use: n/a. Stage of change for addressing substance use diagnoses: No substance use/Not applicable    ASSESSMENT:  Darcy Antoine presents with a Angry and Anxious mood.     her affect is Normal range and intensity, which is  "congruent, with her mood and the content of the session. The client has made progress on their goals.     Darcy Antoine presents with a none risk of suicide, none risk of self-harm, and none risk of harm to others.    For any risk assessment that surpasses a \"low\" rating, a safety plan must be developed.    A safety plan was indicated: no  If yes, describe in detail n/a    PLAN: Between sessions, Darcy Antoine will continue to implement positive coping skills. At the next session, the therapist will use Client-centered Therapy to address anxiety.    Behavioral Health Treatment Plan and Discharge Planning: Darcy Antoine is aware of and agrees to continue to work on their treatment plan. They have identified and are working toward their discharge goals. no    Visit start and stop times:    03/01/24  Start Time: 1132  Stop Time: 1218  Total Visit Time: 46 minutes        "

## 2024-03-29 ENCOUNTER — TELEMEDICINE (OUTPATIENT)
Dept: PSYCHIATRY | Facility: CLINIC | Age: 44
End: 2024-03-29
Payer: COMMERCIAL

## 2024-03-29 DIAGNOSIS — F41.1 GAD (GENERALIZED ANXIETY DISORDER): Primary | ICD-10-CM

## 2024-03-29 PROCEDURE — 90834 PSYTX W PT 45 MINUTES: CPT | Performed by: STUDENT IN AN ORGANIZED HEALTH CARE EDUCATION/TRAINING PROGRAM

## 2024-03-29 NOTE — PSYCH
Virtual Regular Visit    Verification of patient location:    Patient is located at Home in the following state in which I hold an active license [place]      Assessment/Plan:    Problem List Items Addressed This Visit       KAMI (generalized anxiety disorder) - Primary       Goals addressed in session: Goal 1          Reason for visit is No chief complaint on file.       Encounter provider Kaleigh Farris LCSW    Provider located [facility] [place] [address] [facility] [address]  [place] [address] [phone]      Recent Visits  No visits were found meeting these conditions.  Showing recent visits within past 7 days and meeting all other requirements  Today's Visits  Date Type Provider Dept   03/29/24 Telemedicine Kaleigh Farris LCSW  Psychiatric [facility]   Showing today's visits and meeting all other requirements  Future Appointments  No visits were found meeting these conditions.  Showing future appointments within next 150 days and meeting all other requirements       The patient was identified by name and date of birth. Darcy Antoine was informed that this is a telemedicine visit and that the visit is being conducted throughthe Epic Embedded platform. She agrees to proceed..  My office door was closed. No one else was in the room.  She acknowledged consent and understanding of privacy and security of the video platform. The patient has agreed to participate and understands they can discontinue the visit at any time.    Patient is aware this is a billable service.     Subjective  Darcy Antoine is a 43 y.o. female  .      HPI     Past Medical History:   Diagnosis Date    Abnormal Pap smear of cervix     H/o CINI    Allergies 10/23/2020    STEFANIA I (cervical intraepithelial neoplasia I)     GERD (gastroesophageal reflux disease) 10/23/2020    Migraine     Papanicolaou smear 03/09/2018    neg    Radius fracture     Left       Past  Surgical History:   Procedure Laterality Date    COLPOSCOPY      GYNECOLOGIC CRYOSURGERY  09/2008    STEFANIA I    TOOTH EXTRACTION N/A 09/01/2018       Current Outpatient Medications   Medication Sig Dispense Refill    aspirin 81 mg chewable tablet Chew 81 mg daily as needed      loratadine (CLARITIN) 10 mg tablet Take 1 tablet by mouth daily      meloxicam (MOBIC) 15 mg tablet Take 1 tablet (15 mg total) by mouth daily as needed for moderate pain 90 tablet 0    norgestimate-ethinyl estradiol (Tri-Lo-Sprintec) 0.18/0.215/0.25 MG-25 MCG per tablet Take 1 tablet by mouth daily 84 tablet 4    omeprazole (PriLOSEC) 20 mg delayed release capsule Take 20 mg by mouth daily      rizatriptan (MAXALT-MLT) 5 mg disintegrating tablet Take 1 tablet (5 mg total) by mouth once as needed for migraine may repeat in 2 hours if necessary 10 tablet 1    topiramate (Topamax) 50 MG tablet Take 1 tablet (50 mg total) by mouth 2 (two) times a day 180 tablet 1     No current facility-administered medications for this visit.        No Known Allergies    Review of Systems    Video Exam    There were no vitals filed for this visit.    Physical Exam     Behavioral Health Psychotherapy Progress Note    Psychotherapy Provided: Individual Psychotherapy     1. KAMI (generalized anxiety disorder)            Goals addressed in session: Goal 1     DATA: Darcy processed her feelings of happiness after getting a new job. She is excited about this transition even after taking a pay-cut. Darcy is able to prioritize her mental health and implement positive coping skills (identifying cognitive distortions and grounding strategies) to manage her feelings of anxiety and frustration.   During this session, this clinician used the following therapeutic modalities: Client-centered Therapy    Substance Abuse was not addressed during this session. If the client is diagnosed with a co-occurring substance use disorder, please indicate any changes in the frequency or  "amount of use: n/a. Stage of change for addressing substance use diagnoses: No substance use/Not applicable    ASSESSMENT:  Darcy Antoine presents with a Euthymic/ normal mood.     her affect is Normal range and intensity, which is congruent, with her mood and the content of the session. The client has made progress on their goals.     Darcy Antoine presents with a none risk of suicide, none risk of self-harm, and none risk of harm to others.    For any risk assessment that surpasses a \"low\" rating, a safety plan must be developed.    A safety plan was indicated: no  If yes, describe in detail n/a    PLAN: Between sessions, Darcy Antoine will continue to implement positive coping skills. At the next session, the therapist will use Client-centered Therapy to address anxiety.    Behavioral Health Treatment Plan and Discharge Planning: Darcy Antoine is aware of and agrees to continue to work on their treatment plan. They have identified and are working toward their discharge goals. no    Visit start and stop times:    03/29/24  Start Time: 1132  Stop Time: 1213  Total Visit Time: 41 minutes        "

## 2024-05-29 ENCOUNTER — ANNUAL EXAM (OUTPATIENT)
Dept: GYNECOLOGY | Facility: CLINIC | Age: 44
End: 2024-05-29
Payer: COMMERCIAL

## 2024-05-29 VITALS
BODY MASS INDEX: 34.68 KG/M2 | HEIGHT: 59 IN | DIASTOLIC BLOOD PRESSURE: 78 MMHG | WEIGHT: 172 LBS | SYSTOLIC BLOOD PRESSURE: 128 MMHG

## 2024-05-29 DIAGNOSIS — Z12.31 SCREENING MAMMOGRAM FOR BREAST CANCER: ICD-10-CM

## 2024-05-29 DIAGNOSIS — N87.0 MILD DYSPLASIA OF CERVIX (CIN I): Primary | ICD-10-CM

## 2024-05-29 DIAGNOSIS — Z01.419 ENCOUNTER FOR GYNECOLOGICAL EXAMINATION WITHOUT ABNORMAL FINDING: ICD-10-CM

## 2024-05-29 DIAGNOSIS — Z30.41 SURVEILLANCE FOR BIRTH CONTROL, ORAL CONTRACEPTIVES: ICD-10-CM

## 2024-05-29 PROCEDURE — 88175 CYTOPATH C/V AUTO FLUID REDO: CPT | Performed by: OBSTETRICS & GYNECOLOGY

## 2024-05-29 PROCEDURE — S0612 ANNUAL GYNECOLOGICAL EXAMINA: HCPCS | Performed by: OBSTETRICS & GYNECOLOGY

## 2024-05-29 RX ORDER — NORGESTIMATE AND ETHINYL ESTRADIOL 7DAYSX3 LO
1 KIT ORAL DAILY
Qty: 84 TABLET | Refills: 4 | Status: SHIPPED | OUTPATIENT
Start: 2024-05-29

## 2024-05-29 NOTE — PROGRESS NOTES
"Ambulatory Visit  Name: Darcy Antoine      : 1980      MRN: [id number]  Encounter Provider: Govind Pitt MD  Encounter Date: 2024   Encounter department: [facility] GYNECOLOG[facility] [place]    Assessment & Plan     Normal breast and GYN exam  Normal mammogram 2023  Normal Pap smear   History of STEFANIA-1        Plan: Check Pap smear.  Recommend healthy diet and exercise.  1. Screening mammogram for breast cancer  -     Mammo screening bilateral w 3d & cad; Future; Expected date: 10/30/2024      History of Present Illness G0    Darcy Antoine is a 43 y.o. female who presents to the office for annual exam with no complaints.  Doing well on birth control pills and like to continue.  Denies any pelvic pain abnormal bleeding or dyspareunia.  Denies any breast bowel or bladder issues.  No change in family history.  Mother (endometrial cancer).  Maternal grandmother (breast cancer).  Medications reviewed.  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      Objective     /78   Ht 4' 11\" (1.499 m)   Wt 78 kg (172 lb)   LMP 2024 (Exact Date)   BMI 34.74 kg/m²     Physical Exam  Vitals and nursing note reviewed.   Constitutional:       General: She is not in acute distress.     Appearance: She is well-developed.   HENT:      Head: Normocephalic and atraumatic.   Eyes:      Conjunctiva/sclera: Conjunctivae normal.   Cardiovascular:      Rate and Rhythm: Normal rate and regular rhythm.      Heart sounds: No murmur heard.  Pulmonary:      Effort: Pulmonary effort is normal. No respiratory distress.      Breath sounds: Normal breath sounds.   Abdominal:      Palpations: Abdomen is soft.      Tenderness: There is no abdominal tenderness.   Genitourinary:     Vagina: Normal.      Cervix: Normal.      Uterus: Normal.       Adnexa: Right adnexa normal and left adnexa normal.      Comments: External genitalia normal.  No uterine prolapse cystocele or " rectocele.  Musculoskeletal:         General: No swelling.      Cervical back: Neck supple.   Skin:     General: Skin is warm and dry.      Capillary Refill: Capillary refill takes less than 2 seconds.   Neurological:      Mental Status: She is alert.   Psychiatric:         Mood and Affect: Mood normal.       Administrative Statements

## 2024-06-05 LAB
LAB AP GYN PRIMARY INTERPRETATION: NORMAL
Lab: NORMAL

## 2024-07-12 ENCOUNTER — APPOINTMENT (OUTPATIENT)
Dept: LAB | Facility: MEDICAL CENTER | Age: 44
End: 2024-07-12

## 2024-07-12 DIAGNOSIS — Z00.8 HEALTH EXAMINATION IN POPULATION SURVEY: ICD-10-CM

## 2024-07-12 DIAGNOSIS — Z00.6 ENCOUNTER FOR EXAMINATION FOR NORMAL COMPARISON OR CONTROL IN CLINICAL RESEARCH PROGRAM: ICD-10-CM

## 2024-07-12 LAB
CHOLEST SERPL-MCNC: 175 MG/DL
EST. AVERAGE GLUCOSE BLD GHB EST-MCNC: 111 MG/DL
HBA1C MFR BLD: 5.5 %
HDLC SERPL-MCNC: 59 MG/DL
LDLC SERPL CALC-MCNC: 88 MG/DL (ref 0–100)
NONHDLC SERPL-MCNC: 116 MG/DL
TRIGL SERPL-MCNC: 139 MG/DL

## 2024-07-12 PROCEDURE — 36415 COLL VENOUS BLD VENIPUNCTURE: CPT

## 2024-07-12 PROCEDURE — 80061 LIPID PANEL: CPT

## 2024-07-12 PROCEDURE — 83036 HEMOGLOBIN GLYCOSYLATED A1C: CPT

## 2024-07-17 ENCOUNTER — TELEMEDICINE (OUTPATIENT)
Dept: PSYCHIATRY | Facility: CLINIC | Age: 44
End: 2024-07-17
Payer: COMMERCIAL

## 2024-07-17 DIAGNOSIS — F41.1 GAD (GENERALIZED ANXIETY DISORDER): Primary | ICD-10-CM

## 2024-07-17 PROCEDURE — 90832 PSYTX W PT 30 MINUTES: CPT | Performed by: STUDENT IN AN ORGANIZED HEALTH CARE EDUCATION/TRAINING PROGRAM

## 2024-07-17 NOTE — PSYCH
Virtual Regular Visit    Verification of patient location:    Patient is located at Home in the following state in which I hold an active license [place]      Assessment/Plan:    Problem List Items Addressed This Visit       KAMI (generalized anxiety disorder) - Primary       Goals addressed in session: Goal 1          Reason for visit is No chief complaint on file.       Encounter provider Kaleigh Farris LCSW      Recent Visits  No visits were found meeting these conditions.  Showing recent visits within past 7 days and meeting all other requirements  Today's Visits  Date Type Provider Dept   07/17/24 Telemedicine [facility] LCS[facility]   Showing today's visits and meeting all other requirements  Future Appointments  No visits were found meeting these conditions.  Showing future appointments within next 150 days and meeting all other requirements       The patient was identified by name and date of birth. Darcy Antoine was informed that this is a telemedicine visit and that the visit is being conducted throughthe Epic Embedded platform. She agrees to proceed..  My office door was closed. No one else was in the room.  She acknowledged consent and understanding of privacy and security of the video platform. The patient has agreed to participate and understands they can discontinue the visit at any time.    Patient is aware this is a billable service.     Subjective  Darcy Antoine is a 44 y.o. female  .      HPI     Past Medical History:   Diagnosis Date    Abnormal Pap smear of cervix     H/o CINI    Allergies 10/23/2020    STEFANIA I (cervical intraepithelial neoplasia I)     GERD (gastroesophageal reflux disease) 10/23/2020    Migraine     Papanicolaou smear 03/09/2018    neg    Radius fracture     Left       Past Surgical History:   Procedure Laterality Date    COLPOSCOPY      GYNECOLOGIC CRYOSURGERY  09/2008    STEFANIA I    TOOTH EXTRACTION N/A 09/01/2018       Current  Outpatient Medications   Medication Sig Dispense Refill    aspirin 81 mg chewable tablet Chew 81 mg daily as needed      loratadine (CLARITIN) 10 mg tablet Take 1 tablet by mouth daily      meloxicam (MOBIC) 15 mg tablet Take 1 tablet (15 mg total) by mouth daily as needed for moderate pain 90 tablet 0    norgestimate-ethinyl estradiol (Tri-Lo-Sprintec) 0.18/0.215/0.25 MG-25 MCG per tablet Take 1 tablet by mouth daily 84 tablet 4    omeprazole (PriLOSEC) 20 mg delayed release capsule Take 20 mg by mouth daily      rizatriptan (MAXALT-MLT) 5 mg disintegrating tablet Take 1 tablet (5 mg total) by mouth once as needed for migraine may repeat in 2 hours if necessary 10 tablet 1    topiramate (Topamax) 50 MG tablet Take 1 tablet (50 mg total) by mouth 2 (two) times a day 180 tablet 1     No current facility-administered medications for this visit.        No Known Allergies    Review of Systems    Video Exam    There were no vitals filed for this visit.    Physical Exam     Behavioral Health Psychotherapy Progress Note    Psychotherapy Provided: Individual Psychotherapy     1. KAMI (generalized anxiety disorder)            Goals addressed in session: Goal 1     DATA: Darcy processed her feelings about her new job and finally transitioning to hybrid. Created treatment and safety plan. She reports feeling very happy and able to manage her feelings of anxiety. She is implementing CBT strategies to identify cognitive distortions.   During this session, this clinician used the following therapeutic modalities: Client-centered Therapy    Substance Abuse was not addressed during this session. If the client is diagnosed with a co-occurring substance use disorder, please indicate any changes in the frequency or amount of use: N/A. Stage of change for addressing substance use diagnoses: No substance use/Not applicable    ASSESSMENT:  Darcy Antoine presents with a Euthymic/ normal mood.     her affect is Normal range and intensity,  "which is congruent, with her mood and the content of the session. The client has made progress on their goals.     Darcy Antoine presents with a none risk of suicide, none risk of self-harm, and none risk of harm to others.    For any risk assessment that surpasses a \"low\" rating, a safety plan must be developed.    A safety plan was indicated: no  If yes, describe in detail N/A    PLAN: Between sessions, Darcy Antoine will continue to implement CBT strategies. At the next session, the therapist will use Client-centered Therapy to address anxiety.    Behavioral Health Treatment Plan and Discharge Planning: Darcy Antoine is aware of and agrees to continue to work on their treatment plan. They have identified and are working toward their discharge goals. no    Visit start and stop times:    07/17/24  Start Time: 1000  Stop Time: 1035  Total Visit Time: 35 minutes      "

## 2024-07-17 NOTE — BH TREATMENT PLAN
"Outpatient Behavioral Health Psychotherapy Treatment Plan    Darcy Antoine  1980     Date of Initial Psychotherapy Assessment: 2/3/23   Date of Current Treatment Plan: 07/17/24  Treatment Plan Target Date: 1/17/25  Treatment Plan Expiration Date: 1/17/25    Diagnosis:   1. KAMI (generalized anxiety disorder)            Area(s) of Need: \"I am more of a pessimist than and optimist and would like to see how I do in next few months and then decide if I need therapy. Monitoring my progress through this transition\".    Long Term Goal 1 (in the client's own words): \"I would like to be able to handle my stress independently\"     Stage of Change: Maintenance    Target Date for completion: 1/17/24     Anticipated therapeutic modalities: Client Centered     People identified to complete this goal: Darcy Antoine and [facility]      Objective 1: (identify the means of measuring success in meeting the objective): \"Darcy will independently implement positive coping skills to manage daily stressors\".      Objective 2: (identify the means of measuring success in meeting the objective): n/a      I am currently under the care of a [facility] psychiatric provider: no    My [facility] psychiatric provider is: n/a    I am currently taking psychiatric medications: No    I feel that I will be ready for discharge from mental health care when I reach the following (measurable goal/objective): \"I don't know. When having the tools and confidence to manage any feeling\".    For children and adults who have a legal guardian:   Has there been any change to custody orders and/or guardianship status? NA. If yes, attach updated documentation.    I have created my Crisis Plan and have been offered a copy of this plan    Behavioral Health Treatment Plan [facility]: Diagnosis and Treatment Plan explained to Darcy Elina Muñozi Antoine acknowledges an understanding of their diagnosis. Darcy Antoine agrees to this treatment plan.    I have been " offered a copy of this Treatment Plan. yes

## 2024-07-17 NOTE — BH CRISIS PLAN
Client Name: Darcy Antoine       Client YOB: 1980    [facility]      Creation Date: 7/17/24 Update Date: 7/17/25   Created By: Kaleigh Farris LCSW       Step 1: Warning Signs:   Warning Signs   Not sleeping   mind wont stop worring   over thinking   resstesness            Step 2: Internal Coping Strategies:   Internal Coping Strategies   watch tv   paying attention to my thoughts.   going on a walk   listening to music            Step 3: People and social settings that provide distraction:   Name Contact Information   My mom in person   my friend- in text/ person   my co-workers at the [employer] at work/ phone    Places   go to the movies           Step 4: People whom I can ask for help during a crisis:      Name Contact Information    my mom in person    my friends text      Step 5: Professionals or agencies I can contact during a crisis:      Clinican/Agency Name Phone Emergency Contact    Kaleigh Farris LCSW [facility] Emergency Department Emergency Department Phone Emergency Department Address    [facility] Emergency Room [phone] [address] [place] [address]        Crisis Phone Numbers:   Suicide Prevention Lifeline: Call or Text  988 Crisis Text Line: Text HOME to 599-870   Please note: Some [facility] do not have a separate number for Child/Adolescent specific crisis. If your county is not listed under Child/Adolescent, please call the adult number for your county      Adult Crisis Numbers: Child/Adolescent Crisis Numbers   [facility]: [phone] [facility]: [phone]   [facility]: [phone] [facility]: [phone]   [facility]: [phone] [place]: [phone]   [facility]: [phone] [place] [facility]: [phone]   [place] [facility]: [phone]   [facility]: [phone]   [facility]: [phone]   [facility]: [phone]  (daytime) [phone] (after hours, weekends, holidays)      Step 6: Making the environment safer (plan for lethal means safety):   Patient did not identify any lethal methods: Yes     Optional: What is most important to me and worth living for?   Everything- literally.      Susan Safety Plan. Margarita Braun and Lamin Pandya. Used with permission of the authors.

## 2024-07-22 ENCOUNTER — OFFICE VISIT (OUTPATIENT)
Dept: URGENT CARE | Facility: MEDICAL CENTER | Age: 44
End: 2024-07-22
Payer: COMMERCIAL

## 2024-07-22 VITALS
SYSTOLIC BLOOD PRESSURE: 130 MMHG | RESPIRATION RATE: 20 BRPM | WEIGHT: 176 LBS | HEART RATE: 80 BPM | OXYGEN SATURATION: 99 % | DIASTOLIC BLOOD PRESSURE: 71 MMHG | HEIGHT: 59 IN | BODY MASS INDEX: 35.48 KG/M2 | TEMPERATURE: 98.8 F

## 2024-07-22 DIAGNOSIS — L08.9 LOCAL INFECTION OF THE SKIN AND SUBCUTANEOUS TISSUE, UNSPECIFIED: Primary | ICD-10-CM

## 2024-07-22 PROCEDURE — 99213 OFFICE O/P EST LOW 20 MIN: CPT

## 2024-07-22 RX ORDER — CEPHALEXIN 500 MG/1
500 CAPSULE ORAL EVERY 12 HOURS SCHEDULED
Qty: 14 CAPSULE | Refills: 0 | Status: SHIPPED | OUTPATIENT
Start: 2024-07-22 | End: 2024-07-29

## 2024-07-22 NOTE — PROGRESS NOTES
[facility]        NAME: Darcy Antoine is a 44 y.o. female  : 1980    MRN: [id number]  DATE: 2024  TIME: 4:06 PM    Assessment and Plan   Local infection of the skin and subcutaneous tissue, unspecified [L08.9]  1. Local infection of the skin and subcutaneous tissue, unspecified  cephalexin (KEFLEX) 500 mg capsule            Patient Instructions     Please take Keflex 2x daily for 7 days.   Follow up with PCP in 3-5 days.  Proceed to  ER if symptoms worsen.    If tests are performed, our office will contact you with results only if changes need to made to the care plan discussed with you at the visit. You can review your full results on [facility].    Chief Complaint     Chief Complaint   Patient presents with    Skin Problem     Patient states she had tattoo placed to right upper thigh on Wednesday; for the last 6 days she has redness, swelling, itchiness; has had multiple tattoos and no issues prior; taking advil and benadryl with little relief          History of Present Illness       44-year-old female presenting for evaluation of possible skin infection.  Patient states over the past week she has been experiencing redness, swelling and warmth to touch around a new tattoo.  She states that she has had tattoos in the past with colored ink at the same  tattoo shop with no complication.  She denies any fevers or chills.  Patient has been applying Aquaphor with minimal relief.        Review of Systems   Review of Systems   Constitutional:  Negative for chills and fever.   Skin:  Positive for color change.   All other systems reviewed and are negative.        Current Medications       Current Outpatient Medications:     aspirin 81 mg chewable tablet, Chew 81 mg daily as needed, Disp: , Rfl:     cephalexin (KEFLEX) 500 mg capsule, Take 1 capsule (500 mg total) by mouth every 12 (twelve) hours for 7 days, Disp: 14 capsule, Rfl: 0    loratadine (CLARITIN) 10 mg tablet, Take 1 tablet by  mouth daily, Disp: , Rfl:     meloxicam (MOBIC) 15 mg tablet, Take 1 tablet (15 mg total) by mouth daily as needed for moderate pain, Disp: 90 tablet, Rfl: 0    norgestimate-ethinyl estradiol (Tri-Lo-Sprintec) 0.18/0.215/0.25 MG-25 MCG per tablet, Take 1 tablet by mouth daily, Disp: 84 tablet, Rfl: 4    omeprazole (PriLOSEC) 20 mg delayed release capsule, Take 20 mg by mouth daily, Disp: , Rfl:     rizatriptan (MAXALT-MLT) 5 mg disintegrating tablet, Take 1 tablet (5 mg total) by mouth once as needed for migraine may repeat in 2 hours if necessary, Disp: 10 tablet, Rfl: 1    topiramate (Topamax) 50 MG tablet, Take 1 tablet (50 mg total) by mouth 2 (two) times a day, Disp: 180 tablet, Rfl: 1    Current Allergies     Allergies as of 07/22/2024    (No Known Allergies)            The following portions of the patient's history were reviewed and updated as appropriate: allergies, current medications, past family history, past medical history, past social history, past surgical history and problem list.     Past Medical History:   Diagnosis Date    Abnormal Pap smear of cervix     H/o CINI    Allergies 10/23/2020    STEFANIA I (cervical intraepithelial neoplasia I)     GERD (gastroesophageal reflux disease) 10/23/2020    Migraine     Papanicolaou smear 03/09/2018    neg    Radius fracture     Left       Past Surgical History:   Procedure Laterality Date    COLPOSCOPY      GYNECOLOGIC CRYOSURGERY  09/2008    STEFANIA I    TOOTH EXTRACTION N/A 09/01/2018       Family History   Problem Relation Age of Onset    Hypertension Mother     Endometrial cancer Mother     Osteopenia Mother     Hypertension Father     Breast cancer Maternal Grandmother 64    Lung cancer Maternal Grandmother     No Known Problems Maternal Grandfather     Rheum arthritis Paternal Grandmother     Cancer Paternal Grandfather         either stomach, colon or pancreatic-not sure    No Known Problems Maternal Aunt     No Known Problems Maternal Aunt     No Known  "Problems Maternal Aunt          Medications have been verified.        Objective   /71   Pulse 80   Temp 98.8 °F (37.1 °C) (Temporal)   Resp 20   Ht 4' 11\" (1.499 m)   Wt 79.8 kg (176 lb)   SpO2 99%   BMI 35.55 kg/m²        Physical Exam     Physical Exam  Vitals and nursing note reviewed.   Constitutional:       General: She is not in acute distress.     Appearance: Normal appearance. She is not ill-appearing, toxic-appearing or diaphoretic.   HENT:      Head: Normocephalic and atraumatic.   Eyes:      General:         Right eye: No discharge.         Left eye: No discharge.   Cardiovascular:      Rate and Rhythm: Normal rate.      Pulses: Normal pulses.   Pulmonary:      Effort: Pulmonary effort is normal.   Skin:     General: Skin is warm and dry.      Findings: Erythema present.      Comments: Erythema and mild swelling with warmth to touch surrounding tattoo    Neurological:      Mental Status: She is alert.   Psychiatric:         Mood and Affect: Mood normal.         Behavior: Behavior normal.                   "

## 2024-07-22 NOTE — PATIENT INSTRUCTIONS
Please take Keflex 2x daily for 7 days.   Follow up with PCP in 3-5 days.  Proceed to  ER if symptoms worsen.    If tests are performed, our office will contact you with results only if changes need to made to the care plan discussed with you at the visit. You can review your full results on [facility].

## 2024-07-30 LAB
APOB+LDLR+PCSK9 GENE MUT ANL BLD/T: NOT DETECTED
BRCA1+BRCA2 DEL+DUP + FULL MUT ANL BLD/T: NOT DETECTED
MLH1+MSH2+MSH6+PMS2 GN DEL+DUP+FUL M: NOT DETECTED

## 2024-08-08 ENCOUNTER — OFFICE VISIT (OUTPATIENT)
Dept: FAMILY MEDICINE CLINIC | Facility: CLINIC | Age: 44
End: 2024-08-08
Payer: COMMERCIAL

## 2024-08-08 VITALS
DIASTOLIC BLOOD PRESSURE: 84 MMHG | SYSTOLIC BLOOD PRESSURE: 130 MMHG | OXYGEN SATURATION: 99 % | BODY MASS INDEX: 35.52 KG/M2 | HEART RATE: 84 BPM | HEIGHT: 59 IN | TEMPERATURE: 98.2 F | WEIGHT: 176.2 LBS

## 2024-08-08 DIAGNOSIS — G43.009 MIGRAINE WITHOUT AURA AND WITHOUT STATUS MIGRAINOSUS, NOT INTRACTABLE: Primary | ICD-10-CM

## 2024-08-08 DIAGNOSIS — K21.9 GASTROESOPHAGEAL REFLUX DISEASE WITHOUT ESOPHAGITIS: ICD-10-CM

## 2024-08-08 PROCEDURE — 99214 OFFICE O/P EST MOD 30 MIN: CPT | Performed by: FAMILY MEDICINE

## 2024-08-08 NOTE — PROGRESS NOTES
Assessment/Plan:    1. Migraine without aura and without status migrainosus, not intractable  2. BMI 35.0-35.9,adult  3. Gastroesophageal reflux disease without esophagitis      There are no Patient Instructions on file for this visit.    Return in about 6 months (around 2/8/2025).    Subjective:      Patient ID: Darcy Antoine is a 44 y.o. female.    Chief Complaint   Patient presents with    Follow-up     ^ month follow up FMLA       Here for migraines, needs FMLA updated. 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 Stress has reduced, migraines have improved as well. Weather continues to be a trigger for her migraines. Rarely uses the abortive treatment. Topamax helps well enough to control migraines at 50mg once a day. BP controlled. Lipids and A1C controlled, normal. PPI daily.         The following portions of the patient's history were reviewed and updated as appropriate: allergies, current medications, past family history, past medical history, past social history, past surgical history and problem list.    Review of Systems   Constitutional:  Negative for fatigue and fever.   HENT:  Negative for congestion.    Eyes:  Negative for visual disturbance.   Respiratory:  Negative for chest tightness and shortness of breath.    Cardiovascular:  Negative for chest pain and palpitations.   Gastrointestinal:  Positive for nausea. Negative for abdominal pain.   Genitourinary:  Negative for difficulty urinating.   Musculoskeletal:  Negative for arthralgias.   Neurological:  Positive for headaches.   Hematological:  Does not bruise/bleed easily.         Current Outpatient Medications   Medication Sig Dispense Refill    aspirin 81 mg chewable tablet Chew 81 mg daily as needed      loratadine (CLARITIN) 10 mg tablet Take 1 tablet by mouth daily      norgestimate-ethinyl estradiol (Tri-Lo-Sprintec) 0.18/0.215/0.25 MG-25 MCG per tablet Take 1 tablet by mouth daily 84 tablet 4    omeprazole (PriLOSEC) 20 mg delayed  "release capsule Take 20 mg by mouth daily      rizatriptan (MAXALT-MLT) 5 mg disintegrating tablet Take 1 tablet (5 mg total) by mouth once as needed for migraine may repeat in 2 hours if necessary 10 tablet 1    topiramate (Topamax) 50 MG tablet Take 1 tablet (50 mg total) by mouth 2 (two) times a day 180 tablet 1     No current facility-administered medications for this visit.       Objective:    /84 (BP Location: Right arm, Patient Position: Sitting, Cuff Size: Standard)   Pulse 84   Temp 98.2 °F (36.8 °C) (Temporal)   Ht 4' 11\" (1.499 m)   Wt 79.9 kg (176 lb 3.2 oz)   LMP 08/08/2024 (Exact Date)   SpO2 99%   Breastfeeding No   BMI 35.59 kg/m²        Physical Exam  Vitals reviewed.   Constitutional:       Appearance: Normal appearance. She is well-developed.   Cardiovascular:      Rate and Rhythm: Normal rate and regular rhythm.      Heart sounds: Normal heart sounds.   Pulmonary:      Effort: Pulmonary effort is normal.      Breath sounds: Normal breath sounds.   Skin:     General: Skin is warm.   Neurological:      General: No focal deficit present.      Mental Status: She is alert and oriented to person, place, and time.   Psychiatric:         Mood and Affect: Mood normal.         Behavior: Behavior normal.         Thought Content: Thought content normal.         Judgment: Judgment normal.                Henrietta Lal MD  "

## 2024-11-01 ENCOUNTER — HOSPITAL ENCOUNTER (OUTPATIENT)
Dept: RADIOLOGY | Facility: MEDICAL CENTER | Age: 44
Discharge: HOME | End: 2024-11-01
Payer: COMMERCIAL

## 2024-11-01 VITALS — WEIGHT: 176 LBS | BODY MASS INDEX: 35.48 KG/M2 | HEIGHT: 59 IN

## 2024-11-01 DIAGNOSIS — Z12.31 ENCOUNTER FOR SCREENING MAMMOGRAM FOR MALIGNANT NEOPLASM OF BREAST: ICD-10-CM

## 2024-11-01 PROCEDURE — 77063 BREAST TOMOSYNTHESIS BI: CPT

## 2024-11-01 PROCEDURE — 77067 SCR MAMMO BI INCL CAD: CPT

## 2024-11-05 ENCOUNTER — TELEPHONE (OUTPATIENT)
Age: 44
End: 2024-11-05

## 2024-11-05 NOTE — TELEPHONE ENCOUNTER
----- Message from Govind Pitt MD sent at 11/5/2024  2:57 PM EST -----  Patient's mammogram was abnormal.  She needs additional imaging, please place order.  ----- Message -----  From: Radha Stewart MD  Sent: 11/1/2024   5:30 PM EST  To: Govind Pitt MD

## 2024-12-02 DIAGNOSIS — G43.009 MIGRAINE WITHOUT AURA AND WITHOUT STATUS MIGRAINOSUS, NOT INTRACTABLE: ICD-10-CM

## 2024-12-03 DIAGNOSIS — G43.009 MIGRAINE WITHOUT AURA AND WITHOUT STATUS MIGRAINOSUS, NOT INTRACTABLE: ICD-10-CM

## 2024-12-03 RX ORDER — TOPIRAMATE 50 MG/1
50 TABLET, FILM COATED ORAL 2 TIMES DAILY
Qty: 180 TABLET | Refills: 0 | Status: SHIPPED | OUTPATIENT
Start: 2024-12-03

## 2024-12-20 ENCOUNTER — HOSPITAL ENCOUNTER (OUTPATIENT)
Dept: MAMMOGRAPHY | Facility: CLINIC | Age: 44
End: 2024-12-20
Payer: COMMERCIAL

## 2024-12-20 ENCOUNTER — HOSPITAL ENCOUNTER (OUTPATIENT)
Dept: ULTRASOUND IMAGING | Facility: CLINIC | Age: 44
End: 2024-12-20
Payer: COMMERCIAL

## 2024-12-20 DIAGNOSIS — R92.8 ABNORMAL MAMMOGRAM: ICD-10-CM

## 2024-12-20 PROCEDURE — 77065 DX MAMMO INCL CAD UNI: CPT

## 2024-12-20 PROCEDURE — G0279 TOMOSYNTHESIS, MAMMO: HCPCS

## 2024-12-20 PROCEDURE — 76642 ULTRASOUND BREAST LIMITED: CPT

## 2024-12-30 ENCOUNTER — RESULTS FOLLOW-UP (OUTPATIENT)
Dept: GYNECOLOGY | Facility: CLINIC | Age: 44
End: 2024-12-30

## 2025-02-01 ENCOUNTER — APPOINTMENT (OUTPATIENT)
Dept: LAB | Facility: MEDICAL CENTER | Age: 45
End: 2025-02-01
Payer: COMMERCIAL

## 2025-02-01 DIAGNOSIS — G43.009 MIGRAINE WITHOUT AURA AND WITHOUT STATUS MIGRAINOSUS, NOT INTRACTABLE: ICD-10-CM

## 2025-02-01 LAB
ALBUMIN SERPL BCG-MCNC: 3.9 G/DL (ref 3.5–5)
ALP SERPL-CCNC: 48 U/L (ref 34–104)
ALT SERPL W P-5'-P-CCNC: 14 U/L (ref 7–52)
ANION GAP SERPL CALCULATED.3IONS-SCNC: 8 MMOL/L (ref 4–13)
AST SERPL W P-5'-P-CCNC: 11 U/L (ref 13–39)
BASOPHILS # BLD AUTO: 0.08 THOUSANDS/ΜL (ref 0–0.1)
BASOPHILS NFR BLD AUTO: 1 % (ref 0–1)
BILIRUB SERPL-MCNC: 0.31 MG/DL (ref 0.2–1)
BUN SERPL-MCNC: 17 MG/DL (ref 5–25)
CALCIUM SERPL-MCNC: 8.9 MG/DL (ref 8.4–10.2)
CHLORIDE SERPL-SCNC: 104 MMOL/L (ref 96–108)
CHOLEST SERPL-MCNC: 168 MG/DL (ref ?–200)
CO2 SERPL-SCNC: 24 MMOL/L (ref 21–32)
CREAT SERPL-MCNC: 0.71 MG/DL (ref 0.6–1.3)
EOSINOPHIL # BLD AUTO: 0.31 THOUSAND/ΜL (ref 0–0.61)
EOSINOPHIL NFR BLD AUTO: 3 % (ref 0–6)
ERYTHROCYTE [DISTWIDTH] IN BLOOD BY AUTOMATED COUNT: 14 % (ref 11.6–15.1)
EST. AVERAGE GLUCOSE BLD GHB EST-MCNC: 105 MG/DL
GFR SERPL CREATININE-BSD FRML MDRD: 104 ML/MIN/1.73SQ M
GLUCOSE P FAST SERPL-MCNC: 90 MG/DL (ref 65–99)
HBA1C MFR BLD: 5.3 %
HCT VFR BLD AUTO: 41.1 % (ref 34.8–46.1)
HDLC SERPL-MCNC: 58 MG/DL
HGB BLD-MCNC: 13.1 G/DL (ref 11.5–15.4)
IMM GRANULOCYTES # BLD AUTO: 0.02 THOUSAND/UL (ref 0–0.2)
IMM GRANULOCYTES NFR BLD AUTO: 0 % (ref 0–2)
LDLC SERPL CALC-MCNC: 80 MG/DL (ref 0–100)
LYMPHOCYTES # BLD AUTO: 3.49 THOUSANDS/ΜL (ref 0.6–4.47)
LYMPHOCYTES NFR BLD AUTO: 37 % (ref 14–44)
MCH RBC QN AUTO: 29.2 PG (ref 26.8–34.3)
MCHC RBC AUTO-ENTMCNC: 31.9 G/DL (ref 31.4–37.4)
MCV RBC AUTO: 92 FL (ref 82–98)
MONOCYTES # BLD AUTO: 0.59 THOUSAND/ΜL (ref 0.17–1.22)
MONOCYTES NFR BLD AUTO: 6 % (ref 4–12)
NEUTROPHILS # BLD AUTO: 5.07 THOUSANDS/ΜL (ref 1.85–7.62)
NEUTS SEG NFR BLD AUTO: 53 % (ref 43–75)
NONHDLC SERPL-MCNC: 110 MG/DL
NRBC BLD AUTO-RTO: 0 /100 WBCS
PLATELET # BLD AUTO: 429 THOUSANDS/UL (ref 149–390)
PMV BLD AUTO: 9.7 FL (ref 8.9–12.7)
POTASSIUM SERPL-SCNC: 3.7 MMOL/L (ref 3.5–5.3)
PROT SERPL-MCNC: 7.7 G/DL (ref 6.4–8.4)
RBC # BLD AUTO: 4.48 MILLION/UL (ref 3.81–5.12)
SODIUM SERPL-SCNC: 136 MMOL/L (ref 135–147)
TRIGL SERPL-MCNC: 149 MG/DL (ref ?–150)
WBC # BLD AUTO: 9.56 THOUSAND/UL (ref 4.31–10.16)

## 2025-02-01 PROCEDURE — 83036 HEMOGLOBIN GLYCOSYLATED A1C: CPT

## 2025-02-01 PROCEDURE — 80053 COMPREHEN METABOLIC PANEL: CPT

## 2025-02-01 PROCEDURE — 85025 COMPLETE CBC W/AUTO DIFF WBC: CPT

## 2025-02-01 PROCEDURE — 80061 LIPID PANEL: CPT

## 2025-02-01 PROCEDURE — 36415 COLL VENOUS BLD VENIPUNCTURE: CPT

## 2025-02-03 ENCOUNTER — CLINICAL SUPPORT (OUTPATIENT)
Dept: BARIATRICS | Facility: CLINIC | Age: 45
End: 2025-02-03

## 2025-02-03 DIAGNOSIS — R63.5 ABNORMAL WEIGHT GAIN: Primary | ICD-10-CM

## 2025-02-03 PROCEDURE — RECHECK

## 2025-02-03 NOTE — PROGRESS NOTES
"Weight Management Medical Nutrition Assessment  Darcy was here today for meal planning.  Today she 178.6 lbs and has a goal to lose 20 lbs.  She has been through the [facility] program at the [facility] a few years ago.  Reviewed her calorie carb and protein needs.  Discussed the importance of accurate food logging.  She agreed to restarting. She is not currently exercising but will start.  Discussed the importance of eating on a schedule and following interval eating.  Provided sample meal plans, protein list, snack list. Etc.  Questions asked and answered.     Patient seen by Medical Provider in past 6 months:  yes  Requested to schedule appointment with Medical Provider: No      Anthropometric Measurements  Start Weight (#) & Date: 187.5    7/20/2020  Current Weight (#): 178.6  TBW % Change from start weight:5%  Ideal Body Weight (#):97.5  Goal Weight (#):to lose 20 lbs  Highest: 190  Lowest: not sure - under 100?  \"Several years agol\"    Weight Loss History  Previous weight loss attempts: Commercial Programs (Weight Watchers, Fidelina Rdz, etc.)    Food and Nutrition Related History  Wake up: 6 - 7 am    Bed Time:9 - 10 pm    Food Recall  Breakfast:skips but will have coffee    Snack:none  Lunch:chips or grabbing snacks  Snack:none  Dinner:meat veg and starch  Snack:grazes      Beverages: water and coffee/tea, cutting back on soda (mini cnas)  Volume of beverage intake: 16 oz    Weekends: Same  Cravings: chips  Trouble area of day:night    Frequency of Eating out: weekends  Food restrictions:none  Cooking: self   Food Shopping: self    Physical Activity Intake  Activity:none  Frequency: none  Physical limitations/barriers to exercise: none    Estimated Needs  Energy    Kan Gomez Energy Needs: BMR : 1371   .5-1# loss weekly sedentary:  1145 - 1395             1-2# loss weekly lightly active:885 - 1385  Maintenance calories for sedentary activity level: 1645  Protein:53 - 66      (1.2-1.5g/kg " IBW)  Fluid: 52     (35mL/kg IBW)    Nutrition Diagnosis  Yes;    Overweight/obesity  related to Excess energy intake as evidenced by  BMI more than normative standard for age and sex (obesity-grade II 35-39.9)       Nutrition Intervention    Nutrition Prescription  Calories:1000 -1200  Protein:53 - 66  Fluid:52      Nutrition Education:    Calorie controlled menu  Lean protein food choices  Healthy snack options  Food journaling tips      Nutrition Counseling:  Strategies: meal planning, portion sizes, healthy snack choices, hydration, fiber intake, protein intake, exercise, food journal      Monitoring and Evaluation:  Evaluation criteria:  Energy Intake  Meet protein needs  Maintain adequate hydration  Monitor weekly weight  Meal planning/preparation  Food journal   Decreased portions at mealtimes and snacks  Physical activity     Barriers to learning:none  Readiness to change: Action:  (Changing behavior)  Comprehension: good  Expected Compliance: good

## 2025-02-07 VITALS — WEIGHT: 178.6 LBS | BODY MASS INDEX: 36 KG/M2 | HEIGHT: 59 IN

## 2025-02-10 ENCOUNTER — OFFICE VISIT (OUTPATIENT)
Dept: FAMILY MEDICINE CLINIC | Facility: CLINIC | Age: 45
End: 2025-02-10
Payer: COMMERCIAL

## 2025-02-10 VITALS
HEIGHT: 59 IN | HEART RATE: 103 BPM | TEMPERATURE: 98 F | BODY MASS INDEX: 35.8 KG/M2 | DIASTOLIC BLOOD PRESSURE: 74 MMHG | WEIGHT: 177.6 LBS | SYSTOLIC BLOOD PRESSURE: 128 MMHG | OXYGEN SATURATION: 97 %

## 2025-02-10 DIAGNOSIS — G43.009 MIGRAINE WITHOUT AURA AND WITHOUT STATUS MIGRAINOSUS, NOT INTRACTABLE: ICD-10-CM

## 2025-02-10 DIAGNOSIS — K21.9 GASTROESOPHAGEAL REFLUX DISEASE WITHOUT ESOPHAGITIS: ICD-10-CM

## 2025-02-10 DIAGNOSIS — D68.59 THROMBOPHILIA (HCC): Primary | ICD-10-CM

## 2025-02-10 PROCEDURE — 99214 OFFICE O/P EST MOD 30 MIN: CPT | Performed by: FAMILY MEDICINE

## 2025-02-10 RX ORDER — TOPIRAMATE 50 MG/1
50 TABLET, FILM COATED ORAL 2 TIMES DAILY
Qty: 180 TABLET | Refills: 1 | Status: SHIPPED | OUTPATIENT
Start: 2025-02-10

## 2025-02-10 RX ORDER — RIZATRIPTAN BENZOATE 5 MG/1
5 TABLET, ORALLY DISINTEGRATING ORAL ONCE AS NEEDED
Qty: 10 TABLET | Refills: 1 | Status: SHIPPED | OUTPATIENT
Start: 2025-02-10

## 2025-02-10 NOTE — ASSESSMENT & PLAN NOTE
Orders:    topiramate (Topamax) 50 MG tablet; Take 1 tablet (50 mg total) by mouth 2 (two) times a day    rizatriptan (MAXALT-MLT) 5 mg disintegrating tablet; Take 1 tablet (5 mg total) by mouth once as needed for migraine may repeat in 2 hours if necessary

## 2025-02-10 NOTE — PROGRESS NOTES
Name: Darcy Antoine      : 1980      MRN: [id number]  Encounter Provider: Henrietta Lal MD  Encounter Date: 2/10/2025   Encounter department: [facility]  :  Assessment & Plan  Migraine without aura and without status migrainosus, not intractable    Orders:    topiramate (Topamax) 50 MG tablet; Take 1 tablet (50 mg total) by mouth 2 (two) times a day    rizatriptan (MAXALT-MLT) 5 mg disintegrating tablet; Take 1 tablet (5 mg total) by mouth once as needed for migraine may repeat in 2 hours if necessary    Thrombophilia (HCC)         Gastroesophageal reflux disease without esophagitis  Lifestyle changes, try to use PPI every other day or alternate with pepcid             Depression Screening and Follow-up Plan: Patient was screened for depression during today's encounter. They screened negative with a PHQ-2 score of 0.      History of Present Illness   Here for f/u, labs reviewed. Lipids at goal, A1C normal. Takes baby ASA daily most days, platelets coming down steadily. BP controlled. Needs FMLA paperwork completed every 6 months. Taking topamax at least 50mg once a day, sometimes remembers the evening dose.has only needed the maxalt once since the last visit, and has only used the FMLA once since then as well. Uses PPI daily, has not been able to skip more than one day at a time.      Review of Systems   Constitutional:  Negative for fatigue and fever.   HENT:  Negative for congestion.    Eyes:  Negative for visual disturbance.   Respiratory:  Negative for chest tightness and shortness of breath.    Cardiovascular:  Negative for chest pain and palpitations.   Gastrointestinal:  Negative for abdominal pain.   Genitourinary:  Negative for difficulty urinating.   Musculoskeletal:  Negative for arthralgias.   Neurological:  Negative for headaches.   Hematological:  Does not bruise/bleed easily.       Objective   /74 (BP Location: Right arm, Patient Position: Sitting, Cuff Size: Large)    "Pulse 103   Temp 98 °F (36.7 °C) (Temporal)   Ht 4' 11\" (1.499 m)   Wt 80.6 kg (177 lb 9.6 oz)   LMP 01/17/2025   SpO2 97%   BMI 35.87 kg/m²      Physical Exam  Vitals reviewed.   Constitutional:       Appearance: Normal appearance. She is well-developed.   Cardiovascular:      Rate and Rhythm: Normal rate and regular rhythm.      Heart sounds: Normal heart sounds.   Pulmonary:      Effort: Pulmonary effort is normal.      Breath sounds: Normal breath sounds.   Skin:     General: Skin is warm.   Neurological:      General: No focal deficit present.      Mental Status: She is alert and oriented to person, place, and time.   Psychiatric:         Mood and Affect: Mood normal.         Behavior: Behavior normal.         Thought Content: Thought content normal.         Judgment: Judgment normal.         "

## 2025-03-07 ENCOUNTER — CLINICAL SUPPORT (OUTPATIENT)
Dept: BARIATRICS | Facility: CLINIC | Age: 45
End: 2025-03-07

## 2025-03-07 VITALS — WEIGHT: 172.2 LBS | BODY MASS INDEX: 34.72 KG/M2 | HEIGHT: 59 IN

## 2025-03-07 DIAGNOSIS — R63.5 ABNORMAL WEIGHT GAIN: Primary | ICD-10-CM

## 2025-03-07 PROCEDURE — RECHECK

## 2025-03-07 NOTE — PROGRESS NOTES
"    Weight Management Medical Nutrition Assessment  Darcy was here today for meal planning.  Today she 172.2 lbs giving her a loss of 6.4 lbs in the 4 weeks.  She started logging her food and has been increasing protein and limiting carbs.  She ahs cut back on soda and admits water is still something she struggles with at times but is working to increase.  She is no longer skipping meals.  She will have a protein shake on a busier day instead of skipping.  She did go to [place] and admits that she \"indulged a little\"  but during that she also walked several miles each day.  Since she has been back she ordered a walking pad and it just came yesterday.  Once she puts it together, she will start using that - her goal is 4 times a week to start.      Patient seen by Medical Provider in past 6 months:  yes  Requested to schedule appointment with Medical Provider: No        Anthropometric Measurements  Start Weight (#) & Date: 187.5    7/20/2020  Current Weight (#): 172.2  TBW % Change from start weight:8%  Ideal Body Weight (#):97.5  Goal Weight (#):to lose 20 lbs  Highest: 190  Lowest: not sure - under 100?  \"Several years agol\"     Weight Loss History  Previous weight loss attempts: Commercial Programs (Weight Watchers, Fidelina Rdz, etc.)     Food and Nutrition Related History  Wake up: 6 - 7 am        Bed Time:9 - 10 pm     Food Recall  Breakfast:skips but will have coffee                 Snack:none  Lunch: cottage and beets or fruit   Snack:none  Dinner:meat veg and starch or salad with a protein  Snack:quest bites. (Mini bar - 8 gr protein)        Beverages: water and coffee/tea, cutting back on soda (mini cnas)  Volume of beverage intake: 16 oz     Weekends: Same  Cravings: chips  Trouble area of day:night     Frequency of Eating out: weekends  Food restrictions:none  Cooking: self   Food Shopping: self     Physical Activity Intake  Activity:none  Frequency: none  Physical limitations/barriers to exercise: none   "   Estimated Needs  Energy     Kan Gomez Energy Needs: BMR : 1342   .5-1# loss weekly sedentary:  1110 - 1360             1-2# loss weekly lightly active:885 - 1385  Maintenance calories for sedentary activity level: 1610  Protein:53 - 66      (1.2-1.5g/kg IBW)  Fluid: 52     (35mL/kg IBW)     Nutrition Diagnosis  Yes;   Nutrition Diagnosises;    Overweight/obesity  related to  as evidenced by  BMI more than normative standard for age and sex (obesity-grade I 30-34.9)    Nutrition Intervention     Nutrition Prescription  Calories:1000 -1200  Protein:53 - 66  Fluid:52        Nutrition Education:    Calorie controlled menu  Lean protein food choices  Healthy snack options  Food journaling tips        Nutrition Counseling:  Strategies: meal planning, portion sizes, healthy snack choices, hydration, fiber intake, protein intake, exercise, food journal        Monitoring and Evaluation:  Evaluation criteria:  Energy Intake  Meet protein needs  Maintain adequate hydration  Monitor weekly weight  Meal planning/preparation  Food journal   Decreased portions at mealtimes and snacks  Physical activity      Barriers to learning:none  Readiness to change: Action:  (Changing behavior)  Comprehension: good  Expected Compliance: good

## 2025-04-11 ENCOUNTER — CLINICAL SUPPORT (OUTPATIENT)
Dept: BARIATRICS | Facility: CLINIC | Age: 45
End: 2025-04-11

## 2025-04-11 DIAGNOSIS — R63.5 ABNORMAL WEIGHT GAIN: Primary | ICD-10-CM

## 2025-04-11 PROCEDURE — RECHECK

## 2025-04-11 NOTE — PROGRESS NOTES
"Weight Management Medical Nutrition Assessment  Darcy was here today for meal planning.  Today she 167.8 lbs giving her a loss of 4.4 lbs in the 4 weeks.  She continues logging her food and increasing protein and limiting carbs.  She continues to make healthier food choices and increasing her water intake. She ahs cut back on soda and admits water is still something she struggles with at times but is working to increase.  She did get a walking pad but has not been using it consistently.  She plans to start using it several times a week.      Patient seen by Medical Provider in past 6 months:  yes  Requested to schedule appointment with Medical Provider: No        Anthropometric Measurements  Start Weight (#) & Date: 187.5    7/20/2020  Current Weight (#): 167.8  TBW % Change from start weight:11%  Ideal Body Weight (#):97  Goal Weight (#):to lose 20 lbs  Highest: 190  Lowest: not sure - under 100?  \"Several years ago\"     Weight Loss History  Previous weight loss attempts: Commercial Programs (Weight Watchers, Fidelina Rdz, etc.)     Food and Nutrition Related History  Wake up: 6 - 7 am        Bed Time:9 - 10 pm     Food Recall  Breakfast:protein bar and coffee              Snack:none  Lunch: cottage and beets or fruit or salad with a protein - left over chicken or beef  Snack:grape or berries  Dinner:meat veg and starch or salad with a protein  Snack:quest bites. (Mini bar - 8 gr protein)        Beverages: water and coffee/tea, cutting back on soda (mini cnas)  Volume of beverage intake: 16 oz     Weekends: Same  Cravings: chips  Trouble area of day:night     Frequency of Eating out: weekends  Food restrictions:none  Cooking: self   Food Shopping: self     Physical Activity Intake  Activity:none  Frequency: none  Physical limitations/barriers to exercise: none     Estimated Needs  Energy     Kan Gomez Energy Needs: BMR : 1322   .5-1# loss weekly sedentary:  1086  - 1336             1-2# loss weekly lightly " active:817- 1317  Maintenance calories for sedentary activity level: 1586  Protein:53 - 66      (1.2-1.5g/kg IBW)  Fluid: 71     (35mL/kg IBW)     Nutrition Diagnosis  Yes;   Nutrition Diagnosises;    Overweight/obesity  related to  as evidenced by  BMI more than normative standard for age and sex (obesity-grade I 30-34.9)     Nutrition Intervention     Nutrition Prescription  Calories:1000 -1200  Protein:53 - 66  Fluid:52        Nutrition Education:    Calorie controlled menu  Lean protein food choices  Healthy snack options  Food journaling tips        Nutrition Counseling:  Strategies: meal planning, portion sizes, healthy snack choices, hydration, fiber intake, protein intake, exercise, food journal        Monitoring and Evaluation:  Evaluation criteria:  Energy Intake  Meet protein needs  Maintain adequate hydration  Monitor weekly weight  Meal planning/preparation  Food journal   Decreased portions at mealtimes and snacks  Physical activity      Barriers to learning:none  Readiness to change: Action:  (Changing behavior)  Comprehension: good  Expected Compliance: good

## 2025-04-13 VITALS — BODY MASS INDEX: 33.83 KG/M2 | HEIGHT: 59 IN | WEIGHT: 167.8 LBS

## 2025-05-12 ENCOUNTER — CLINICAL SUPPORT (OUTPATIENT)
Dept: BARIATRICS | Facility: CLINIC | Age: 45
End: 2025-05-12

## 2025-05-12 VITALS — HEIGHT: 59 IN | WEIGHT: 167.6 LBS | BODY MASS INDEX: 33.79 KG/M2

## 2025-05-12 DIAGNOSIS — R63.5 ABNORMAL WEIGHT GAIN: Primary | ICD-10-CM

## 2025-05-12 PROCEDURE — RECHECK

## 2025-05-12 NOTE — PROGRESS NOTES
"Weight Management Medical Nutrition Assessment  Darcy was here today for meal planning.  Today she 167.6 lbs which means she maintained her weight from her last visit 4 weeks ago.  She continues logging her food and increasing protein and limiting carbs.  She continues to make healthier food choices and increasing her water intake. She has cut back on soda and admits water is still something she struggles with at times but is working to increase.  She has been struggling with preparing dinner so she has been ordering pre-made dinner by [employer]  and having them for dinner or lunch.  She is leaving this Thursday to go on a five day cruise.  We discussed ways that she could make good food choices while on the ship.       Patient seen by Medical Provider in past 6 months:  yes  Requested to schedule appointment with Medical Provider: No        Anthropometric Measurements  Start Weight (#) & Date: 187.5    7/20/2020  Current Weight (#): 167.6  TBW % Change from start weight:11%  Ideal Body Weight (#):97  Goal Weight (#):to lose 20 lbs  Highest: 190  Lowest: not sure - under 100?  \"Several years ago\"     Weight Loss History  Previous weight loss attempts: Commercial Programs (Weight Watchers, [employer], etc.)     Food and Nutrition Related History  Wake up: 6 - 7 am        Bed Time:9 - 10 pm     Food Recall  Breakfast:protein bar and coffee              Snack:none  Lunch: cottage and beets or fruit or salad with a protein - left over chicken or beef  Snack:grape or berries  Dinner:meat veg and starch or salad with a protein  Snack:quest bites. (Mini bar - 8 gr protein)        Beverages: water and coffee/tea, cutting back on soda (mini cnas)  Volume of beverage intake: 16 oz     Weekends: Same  Cravings: chips  Trouble area of day:night     Frequency of Eating out: weekends  Food restrictions:none  Cooking: self   Food Shopping: self     Physical Activity Intake  Activity:none  Frequency: none  Physical " limitations/barriers to exercise: none     Estimated Needs  Energy     [employer] Energy Needs: BMR : 1322   .5-1# loss weekly sedentary:  1086  - 1336             1-2# loss weekly lightly active:817- 1317  Maintenance calories for sedentary activity level: 1586  Protein:53 - 66      (1.2-1.5g/kg IBW)  Fluid: 71     (35mL/kg IBW)     Nutrition Diagnosis  Yes;   Nutrition Diagnosises;    Overweight/obesity  related to  as evidenced by  BMI more than normative standard for age and sex (obesity-grade I 30-34.9)     Nutrition Intervention     Nutrition Prescription  Calories:1000 -1200  Protein:53 - 66  Fluid:52        Nutrition Education:    Calorie controlled menu  Lean protein food choices  Healthy snack options  Food journaling tips        Nutrition Counseling:  Strategies: meal planning, portion sizes, healthy snack choices, hydration, fiber intake, protein intake, exercise, food journal        Monitoring and Evaluation:  Evaluation criteria:  Energy Intake  Meet protein needs  Maintain adequate hydration  Monitor weekly weight  Meal planning/preparation  Food journal   Decreased portions at mealtimes and snacks  Physical activity      Barriers to learning:none  Readiness to change: Action:  (Changing behavior)  Comprehension: good  Expected Compliance: good

## 2025-06-06 ENCOUNTER — ANNUAL EXAM (OUTPATIENT)
Dept: OBGYN CLINIC | Facility: CLINIC | Age: 45
End: 2025-06-06
Payer: COMMERCIAL

## 2025-06-06 VITALS
HEIGHT: 59 IN | BODY MASS INDEX: 33.38 KG/M2 | WEIGHT: 165.6 LBS | SYSTOLIC BLOOD PRESSURE: 112 MMHG | DIASTOLIC BLOOD PRESSURE: 72 MMHG

## 2025-06-06 DIAGNOSIS — Z30.41 SURVEILLANCE FOR BIRTH CONTROL, ORAL CONTRACEPTIVES: ICD-10-CM

## 2025-06-06 DIAGNOSIS — Z01.419 ENCOUNTER FOR WELL WOMAN EXAM: Primary | ICD-10-CM

## 2025-06-06 DIAGNOSIS — Z12.39 ENCOUNTER FOR SCREENING BREAST EXAMINATION: ICD-10-CM

## 2025-06-06 PROCEDURE — S0612 ANNUAL GYNECOLOGICAL EXAMINA: HCPCS | Performed by: PHYSICIAN ASSISTANT

## 2025-06-06 RX ORDER — NORGESTIMATE AND ETHINYL ESTRADIOL 7DAYSX3 LO
1 KIT ORAL DAILY
Qty: 84 TABLET | Refills: 4 | Status: SHIPPED | OUTPATIENT
Start: 2025-06-06

## 2025-06-06 NOTE — PROGRESS NOTES
":  Assessment & Plan  Encounter for well woman exam         Encounter for screening breast examination         Surveillance for birth control, oral contraceptives    Orders:    norgestimate-ethinyl estradiol (ORTHO TRI-CYCLEN LO) 0.18/0.215/0.25 MG-25 MCG per tablet; Take 1 tablet by mouth daily        History of Present Illness     Darcy Antoine is a 44 y.o. female   Pt presents for her annual exam today--  She has no gyn complaints except occ hot flash  Thinking of stopping OCP for a few months to see how she feels/how cycles are  She has little to no bleeding or pelvic pain  Bowel and bladder are regular  Colonoscopy--  No breast concerns today  Last mammo--12/24    No pap today.    Rx mammo  Rx ortho lo--pt thinking of stopping and observing x a few months  Daily mvi  Nsaids prn      Review of Systems   Constitutional:  Negative for chills, fever and unexpected weight change.   HENT:  Negative for ear pain and sore throat.    Eyes:  Negative for pain and visual disturbance.   Respiratory:  Negative for cough and shortness of breath.    Cardiovascular:  Negative for chest pain and palpitations.   Gastrointestinal:  Negative for abdominal pain, blood in stool, constipation, diarrhea and vomiting.   Genitourinary: Negative.  Negative for dysuria and hematuria.   Musculoskeletal:  Negative for arthralgias and back pain.   Skin:  Negative for color change and rash.   Neurological:  Negative for seizures and syncope.   All other systems reviewed and are negative.    Objective   /72 (BP Location: Left arm, Patient Position: Sitting, Cuff Size: Standard)   Ht 4' 11\" (1.499 m)   Wt 75.1 kg (165 lb 9.6 oz)   LMP 06/05/2025 (Exact Date)   BMI 33.45 kg/m²      Physical Exam  Vitals and nursing note reviewed.   Constitutional:       General: She is not in acute distress.     Appearance: Normal appearance. She is well-developed.   HENT:      Head: Normocephalic and atraumatic.     Eyes:      Conjunctiva/sclera: " Conjunctivae normal.       Cardiovascular:      Rate and Rhythm: Normal rate and regular rhythm.      Heart sounds: No murmur heard.  Pulmonary:      Effort: Pulmonary effort is normal. No respiratory distress.      Breath sounds: Normal breath sounds.   Chest:   Breasts:     Right: Normal.      Left: Normal.   Abdominal:      Palpations: Abdomen is soft.      Tenderness: There is no abdominal tenderness.   Genitourinary:     General: Normal vulva.      Vagina: Normal.      Cervix: Normal.      Uterus: Normal.       Adnexa: Right adnexa normal and left adnexa normal.      Rectum: Normal.     Musculoskeletal:         General: No swelling.      Cervical back: Neck supple.     Skin:     General: Skin is warm and dry.      Capillary Refill: Capillary refill takes less than 2 seconds.     Neurological:      Mental Status: She is alert.     Psychiatric:         Mood and Affect: Mood normal.

## 2025-06-13 ENCOUNTER — CLINICAL SUPPORT (OUTPATIENT)
Dept: BARIATRICS | Facility: CLINIC | Age: 45
End: 2025-06-13

## 2025-06-13 VITALS — BODY MASS INDEX: 33.38 KG/M2 | WEIGHT: 165.6 LBS | HEIGHT: 59 IN

## 2025-06-13 DIAGNOSIS — R63.5 ABNORMAL WEIGHT GAIN: Primary | ICD-10-CM

## 2025-06-13 PROCEDURE — RECHECK

## 2025-06-13 NOTE — PROGRESS NOTES
"Weight Management Medical Nutrition Assessment  Darcy was here today for meal planning.  Today she 165.6 lbs  giving her a loss of 2 lbs since her last visit.    4 weeks ago.  She continues logging her food but admits not as consistently.     and increasing protein and limiting carbs.  She continues to make healthier food choices and increasing her water intake. She has cut back on soda and admits water is still something she struggles with at times but is working to increase.  She has been struggling with preparing dinner so she has been ordering pre-made dinner by [employer]  and having them for dinner or lunch.  She is leaving this Thursday to go on a five day cruise.  We discussed ways that she could make good food choices while on the ship.    Was on cruise and walked  80 00- [id number] everyday.  Was very active.  Back on track.not logging. Going. Eatign out a more recetnly.     Patient seen by Medical Provider in past 6 months:  yes  Requested to schedule appointment with Medical Provider: No        Anthropometric Measurements  Start Weight (#) & Date: 187.5    7/20/2020  Current Weight (#): 167.6  TBW % Change from start weight:11%  Ideal Body Weight (#):97  Goal Weight (#):to lose 20 lbs  Highest: 190  Lowest: not sure - under 100?  \"Several years ago\"     Weight Loss History  Previous weight loss attempts: Commercial Programs (Weight Watchers, [employer], etc.)     Food and Nutrition Related History  Wake up: 6 - 7 am        Bed Time:9 - 10 pm     Food Recall  Breakfast:protein bar and coffee              Snack:none  Lunch: cottage and beets or fruit or salad with a protein - left over chicken or beef  Snack:grape or berries  Dinner:meat veg and starch or salad with a protein  Snack:quest bites. (Mini bar - 8 gr protein)        Beverages: water and coffee/tea, cutting back on soda (mini cnas)  Volume of beverage intake: 16 oz     Weekends: Same  Cravings: chips  Trouble area of day:night     Frequency of Eating " out: weekends  Food restrictions:none  Cooking: self   Food Shopping: self     Physical Activity Intake  Activity:none  Frequency: none  Physical limitations/barriers to exercise: none     Estimated Needs  Energy     [employer] Energy Needs: BMR : 1322   .5-1# loss weekly sedentary:  1086  - 1336             1-2# loss weekly lightly active:817- 1317  Maintenance calories for sedentary activity level: 1586  Protein:53 - 66      (1.2-1.5g/kg IBW)  Fluid: 71     (35mL/kg IBW)     Nutrition Diagnosis  Yes;   Nutrition Diagnosises;    Overweight/obesity  related to  as evidenced by  BMI more than normative standard for age and sex (obesity-grade I 30-34.9)     Nutrition Intervention     Nutrition Prescription  Calories:1000 -1200  Protein:53 - 66  Fluid:52        Nutrition Education:    Calorie controlled menu  Lean protein food choices  Healthy snack options  Food journaling tips        Nutrition Counseling:  Strategies: meal planning, portion sizes, healthy snack choices, hydration, fiber intake, protein intake, exercise, food journal        Monitoring and Evaluation:  Evaluation criteria:  Energy Intake  Meet protein needs  Maintain adequate hydration  Monitor weekly weight  Meal planning/preparation  Food journal   Decreased portions at mealtimes and snacks  Physical activity      Barriers to learning:none  Readiness to change: Action:  (Changing behavior)  Comprehension: good  Expected Compliance: good

## 2025-07-11 ENCOUNTER — CLINICAL SUPPORT (OUTPATIENT)
Dept: BARIATRICS | Facility: CLINIC | Age: 45
End: 2025-07-11

## 2025-07-11 VITALS — HEIGHT: 59 IN | WEIGHT: 165.5 LBS | BODY MASS INDEX: 33.36 KG/M2

## 2025-07-11 DIAGNOSIS — R63.5 ABNORMAL WEIGHT GAIN: Primary | ICD-10-CM

## 2025-07-11 PROCEDURE — RECHECK

## 2025-07-11 NOTE — PROGRESS NOTES
"Weight Management Medical Nutrition Assessment  Darcy was here today for meal planning.  Today she 165.6 lbs which is exactly the same as her last visit, so she has maintained her weight.  She continues logging her food but admits not as consistently.  She has been increasing protein and limiting carbs.  She continues to make healthier food choices and increasing her water intake. She has out soda and continues to increase her water intake.  She is not exercising on a regular basis.  Discussed the importance of consistent and regular basis.  Recommend that she start using her walking pad.  Her goal for her next visit is to walk 3 times a week during lunch for 15 minutes.  She even set the timer on her phone.      Patient seen by Medical Provider in past 6 months:  yes  Requested to schedule appointment with Medical Provider: No        Anthropometric Measurements  Start Weight (#) & Date: 187.5    7/20/2020  Current Weight (#): 165.6  TBW % Change from start weight:12%  Ideal Body Weight (#):97  Goal Weight (#):to lose 20 lbs  Highest: 190  Lowest: not sure - under 100?  \"Several years ago\"     Weight Loss History  Previous weight loss attempts: Commercial Programs (Weight Watchers, [employer], etc.)     Food and Nutrition Related History  Wake up: 6 - 7 am        Bed Time:9 - 10 pm     Food Recall  Breakfast:protein bar and coffee              Snack:none  Lunch: cottage and beets or fruit or salad with a protein - left over chicken or beef  Snack:grape or berries  Dinner:meat veg and starch or salad with a protein  Snack:quest bites. (Mini bar - 8 gr protein)        Beverages: water and coffee/tea, cutting back on soda (mini cnas)  Volume of beverage intake: 16 oz     Weekends: Same  Cravings: chips  Trouble area of day:night     Frequency of Eating out: weekends  Food restrictions:none  Cooking: self   Food Shopping: self     Physical Activity Intake  Activity:none  Frequency: none  Physical limitations/barriers " to exercise: none     Estimated Needs  Energy     [employer] Energy Needs: BMR : 1322   .5-1# loss weekly sedentary:  1086  - 1336             1-2# loss weekly lightly active:817- 1317  Maintenance calories for sedentary activity level: 1586  Protein:53 - 66      (1.2-1.5g/kg IBW)  Fluid: 71     (35mL/kg IBW)     Nutrition Diagnosis  Yes;   Nutrition Diagnosises;    Overweight/obesity  related to  as evidenced by  BMI more than normative standard for age and sex (obesity-grade I 30-34.9)     Nutrition Intervention     Nutrition Prescription  Calories:1000 -1200  Protein:53 - 66  Fluid:52        Nutrition Education:    Calorie controlled menu  Lean protein food choices  Healthy snack options  Food journaling tips        Nutrition Counseling:  Strategies: meal planning, portion sizes, healthy snack choices, hydration, fiber intake, protein intake, exercise, food journal        Monitoring and Evaluation:  Evaluation criteria:  Energy Intake  Meet protein needs  Maintain adequate hydration  Monitor weekly weight  Meal planning/preparation  Food journal   Decreased portions at mealtimes and snacks  Physical activity      Barriers to learning:none  Readiness to change: Action:  (Changing behavior)  Comprehension: good  Expected Compliance: good

## 2025-08-13 ENCOUNTER — OFFICE VISIT (OUTPATIENT)
Dept: FAMILY MEDICINE CLINIC | Facility: CLINIC | Age: 45
End: 2025-08-13
Payer: COMMERCIAL

## 2025-08-22 ENCOUNTER — CLINICAL SUPPORT (OUTPATIENT)
Dept: BARIATRICS | Facility: CLINIC | Age: 45
End: 2025-08-22

## 2025-08-22 VITALS — HEIGHT: 59 IN | WEIGHT: 162.6 LBS | BODY MASS INDEX: 32.78 KG/M2

## 2025-08-22 DIAGNOSIS — R63.5 ABNORMAL WEIGHT GAIN: Primary | ICD-10-CM

## 2025-08-22 PROCEDURE — RECHECK
